# Patient Record
Sex: FEMALE | Race: WHITE | NOT HISPANIC OR LATINO | Employment: PART TIME | URBAN - METROPOLITAN AREA
[De-identification: names, ages, dates, MRNs, and addresses within clinical notes are randomized per-mention and may not be internally consistent; named-entity substitution may affect disease eponyms.]

---

## 2017-04-05 ENCOUNTER — OFFICE VISIT - HEALTHEAST (OUTPATIENT)
Dept: FAMILY MEDICINE | Facility: CLINIC | Age: 32
End: 2017-04-05

## 2017-04-05 DIAGNOSIS — J32.9 SINUSITIS: ICD-10-CM

## 2017-04-05 DIAGNOSIS — M54.50 LOW BACK PAIN: ICD-10-CM

## 2017-04-06 ENCOUNTER — COMMUNICATION - HEALTHEAST (OUTPATIENT)
Dept: FAMILY MEDICINE | Facility: CLINIC | Age: 32
End: 2017-04-06

## 2017-07-29 ENCOUNTER — HEALTH MAINTENANCE LETTER (OUTPATIENT)
Age: 32
End: 2017-07-29

## 2020-02-24 ENCOUNTER — OFFICE VISIT (OUTPATIENT)
Dept: ALLERGY | Facility: CLINIC | Age: 35
End: 2020-02-24
Payer: COMMERCIAL

## 2020-02-24 VITALS
DIASTOLIC BLOOD PRESSURE: 86 MMHG | OXYGEN SATURATION: 100 % | TEMPERATURE: 98.3 F | SYSTOLIC BLOOD PRESSURE: 118 MMHG | BODY MASS INDEX: 28.75 KG/M2 | HEIGHT: 63 IN | HEART RATE: 92 BPM | WEIGHT: 162.26 LBS

## 2020-02-24 DIAGNOSIS — L50.9 URTICARIA: ICD-10-CM

## 2020-02-24 DIAGNOSIS — J30.81 ALLERGIC RHINITIS DUE TO ANIMALS: ICD-10-CM

## 2020-02-24 DIAGNOSIS — J30.89 ALLERGIC RHINITIS CAUSED BY MOLD: ICD-10-CM

## 2020-02-24 DIAGNOSIS — J30.89 ALLERGIC RHINITIS DUE TO DUST MITE: ICD-10-CM

## 2020-02-24 DIAGNOSIS — J30.1 SEASONAL ALLERGIC RHINITIS DUE TO POLLEN: ICD-10-CM

## 2020-02-24 PROCEDURE — 95004 PERQ TESTS W/ALRGNC XTRCS: CPT | Performed by: ALLERGY & IMMUNOLOGY

## 2020-02-24 PROCEDURE — 99204 OFFICE O/P NEW MOD 45 MIN: CPT | Mod: 25 | Performed by: ALLERGY & IMMUNOLOGY

## 2020-02-24 RX ORDER — FEXOFENADINE HCL 180 MG/1
180 TABLET ORAL DAILY
Status: ON HOLD | COMMUNITY
Start: 2011-09-14 | End: 2022-07-22

## 2020-02-24 RX ORDER — LEVONORGESTREL AND ETHINYL ESTRADIOL 0.15-0.03
KIT ORAL DAILY
Status: ON HOLD | COMMUNITY
Start: 2019-12-18 | End: 2020-04-28

## 2020-02-24 RX ORDER — EPINEPHRINE 0.3 MG/.3ML
0.3 INJECTION SUBCUTANEOUS PRN
Qty: 0.6 ML | Refills: 3 | Status: SHIPPED | OUTPATIENT
Start: 2020-02-24 | End: 2022-08-04

## 2020-02-24 RX ORDER — AZELASTINE 1 MG/ML
2 SPRAY, METERED NASAL 2 TIMES DAILY PRN
Qty: 30 ML | Refills: 3 | Status: SHIPPED | OUTPATIENT
Start: 2020-02-24 | End: 2022-08-04

## 2020-02-24 RX ORDER — FLUTICASONE PROPIONATE 50 MCG
2 SPRAY, SUSPENSION (ML) NASAL DAILY
Qty: 16 G | Refills: 3 | Status: SHIPPED | OUTPATIENT
Start: 2020-02-24

## 2020-02-24 ASSESSMENT — ENCOUNTER SYMPTOMS
EYE REDNESS: 0
DIARRHEA: 0
SINUS PRESSURE: 0
ARTHRALGIAS: 0
SHORTNESS OF BREATH: 0
EYE DISCHARGE: 0
WHEEZING: 0
COUGH: 0
CHEST TIGHTNESS: 0
FACIAL SWELLING: 0
MYALGIAS: 0
FEVER: 0
ACTIVITY CHANGE: 0
CHILLS: 0
HEADACHES: 0
RHINORRHEA: 1
EYE ITCHING: 0
JOINT SWELLING: 0
ADENOPATHY: 0
NAUSEA: 0
VOMITING: 0

## 2020-02-24 ASSESSMENT — MIFFLIN-ST. JEOR: SCORE: 1418.13

## 2020-02-24 NOTE — LETTER
2/24/2020         RE: Reanna Gilbert  [address] [facility] [id number]        Dear Colleague,    Thank you for referring your patient, Reanna Gilbert, to the [facility]. Please see a copy of my visit note below.    SUBJECTIVE:                                                                   Reanna Gilbert presents today to our Allergy Clinic at [facility] for a new patient visit.   She is a 34-year-old female with possible environmental allergies.  She has environmental allergy symptoms since childhood. The patient report  Perennial/ chronic nasal symptoms sneezing, nose-blowing, frequent nasal congestion, and occasionally clear rhinorrhea. She denies ocular symptoms like itching, redness, watering, and burning.   She is not worse around dogs/cats.   Intranasal steroids have not been used.  Oral antihistamines (cetirizine/loratadine/fexofenadine) have been used, and they were partially effective.    There is no history of PE tubes, sinus surgeries, or tonsillectomy/adenoidectomy.  The patient reports having intermittent hives, manifested by solitary red, itchy, and raised spots that do not last more than 20 minutes. She knows for sure that dogs and dust exposure will trigger hives, but she still has hives without that exposure. Because the lesions are short-lasting, she doesn't take any meds for that. She has never had angioedema.     There is no problem list on file for this patient.      History reviewed. No pertinent past medical history.   Problem (# of Occurrences) Relation (Name,Age of Onset)    Allergies (3) Mother, Sister (Karely), Sister (Marcia)    Urticaria (1) Sister (Karely)        History reviewed. No pertinent surgical history.  Social History     Socioeconomic History     Marital status: Single     Spouse name: None     Number of children: None     Years of education: None     Highest education level: None   Occupational History     None    Social Needs     Financial resource strain: None     Food insecurity:     Worry: None     Inability: None     Transportation needs:     Medical: None     Non-medical: None   Tobacco Use     Smoking status: Never Smoker     Smokeless tobacco: Never Used   Substance and Sexual Activity     Alcohol use: Yes     Comment: socially     Drug use: None     Sexual activity: None   Lifestyle     Physical activity:     Days per week: None     Minutes per session: None     Stress: None   Relationships     Social connections:     Talks on phone: None     Gets together: None     Attends [employer] service: None     Active member of club or organization: None     Attends meetings of clubs or organizations: None     Relationship status: None     Intimate partner violence:     Fear of current or ex partner: None     Emotionally abused: None     Physically abused: None     Forced sexual activity: None   Other Topics Concern     None   Social History Narrative    February 24, 2020    ENVIRONMENTAL HISTORY: [place] The home is heated with a electric furnace. They do have central air conditioning. The patient's bedroom is furnished with carpeting in bedroom and fabric window coverings.  Pets inside the house include 2 dogs. There is no history of cockroach or mice infestation. There are no smokers in the house.  The house does not have a damp basement.            Review of Systems   Constitutional: Negative for activity change, chills and fever.   HENT: Positive for postnasal drip, rhinorrhea and sneezing. Negative for congestion, dental problem, ear pain, facial swelling, nosebleeds and sinus pressure.    Eyes: Negative for discharge, redness and itching.   Respiratory: Negative for cough, chest tightness, shortness of breath and wheezing.    Cardiovascular: Negative for chest pain.   Gastrointestinal: Negative for diarrhea, nausea and vomiting.   Musculoskeletal: Negative for arthralgias,  "joint swelling and myalgias.   Skin: Positive for rash (occasional hives).   Neurological: Negative for headaches.   Hematological: Negative for adenopathy.   Psychiatric/Behavioral: Negative for behavioral problems and self-injury.           Current Outpatient Medications:      azelastine (ASTELIN) 0.1 % nasal spray, Spray 2 sprays into both nostrils 2 times daily as needed for rhinitis, Disp: 30 mL, Rfl: 3     EPINEPHrine (ANY BX GENERIC EQUIV) 0.3 MG/0.3ML injection 2-pack, Inject 0.3 mLs (0.3 mg) into the muscle as needed for anaphylaxis, Disp: 0.6 mL, Rfl: 3     fluticasone (FLONASE) 50 MCG/ACT nasal spray, Spray 2 sprays into both nostrils daily, Disp: 16 g, Rfl: 3     SETLAKIN 0.15-0.03 MG tablet, daily, Disp: , Rfl:      fexofenadine (ALLEGRA) 180 MG tablet, Take 180 mg by mouth daily, Disp: , Rfl:   Immunization History   Administered Date(s) Administered     DTaP, Unspecified 08/03/2009     FLU 6-35 months 10/16/2013, 10/05/2016     HPV Quadrivalent 11/23/2007, 03/12/2008, 10/15/2008     HepB, Unspecified 09/08/1999, 10/14/1999, 04/24/2000, 08/30/2011     Influenza (IIV3) PF 11/23/2007, 10/06/2015     Influenza Vaccine IM > 6 months Valent IIV4 10/02/2017, 10/16/2019     MMR 11/02/1988, 09/08/1999     Meningococcal (Menactra ) 04/19/2006     Meningococcal (Menomune ) 04/19/2006     No Known Allergies  OBJECTIVE:                                                                 /86 (BP Location: Left arm, Patient Position: Sitting, Cuff Size: Adult Regular)   Pulse 92   Temp 98.3  F (36.8  C) (Tympanic)   Ht 1.605 m (5' 3.19\")   Wt 73.6 kg (162 lb 4.1 oz)   SpO2 100%   BMI 28.57 kg/m           Physical Exam  Vitals signs and nursing note reviewed.   Constitutional:       General: She is not in acute distress.     Appearance: She is not ill-appearing, toxic-appearing or diaphoretic.   HENT:      Head: Normocephalic and atraumatic.      Right Ear: Tympanic membrane, ear canal and external ear " normal.      Left Ear: Tympanic membrane, ear canal and external ear normal.      Nose: No mucosal edema, congestion or rhinorrhea.      Right Turbinates: Not enlarged, swollen or pale.      Left Turbinates: Not enlarged, swollen or pale.      Mouth/Throat:      Lips: Pink.      Mouth: Mucous membranes are moist.      Pharynx: Oropharynx is clear. No pharyngeal swelling, oropharyngeal exudate, posterior oropharyngeal erythema or uvula swelling.   Eyes:      General:         Right eye: No discharge.         Left eye: No discharge.      Conjunctiva/sclera: Conjunctivae normal.   Neck:      Musculoskeletal: Normal range of motion.   Cardiovascular:      Rate and Rhythm: Normal rate and regular rhythm.      Heart sounds: Normal heart sounds. No murmur.   Pulmonary:      Effort: Pulmonary effort is normal. No respiratory distress.      Breath sounds: Normal breath sounds and air entry. No stridor, decreased air movement or transmitted upper airway sounds. No decreased breath sounds, wheezing, rhonchi or rales.   Musculoskeletal: Normal range of motion.   Lymphadenopathy:      Cervical: No cervical adenopathy.   Skin:     General: Skin is warm.      Capillary Refill: Capillary refill takes less than 2 seconds.      Findings: No rash.   Neurological:      Mental Status: She is alert and oriented to person, place, and time.   Psychiatric:         Mood and Affect: Mood normal.         Behavior: Behavior normal.         WORKUP:     ENVIRONMENTAL PERCUTANEOUS SKIN TESTING: ADULT  [facility] 2/24/2020   Consent Y   Ordering Physician Marry   Interpreting Physician Marry   Testing Technician Radha LOZADA RN   Location Back   Time start: 12:15 PM   Time End: 12:30 PM   Positive Control: Histatrol*ALK 1 mg/ml 4/10   Negative Control: 50% Glycerin 0/0   Cat Hair*ALK (10,000 BAU/ml) 5/15   AP Dog Hair/Dander (1:100 w/v) 6/15   Dust Mite p. 30,000 AU/ml 5/20   Dust Mite f. (30,000 AU/ml) 0/0   Lakhwinder (W/F in millimeters)  0/0   Adis Grass (100,000 BAU/mL) 4/11   Red [place] (W/F in millimeters) 0/0   Maple/[place]. [place] Baptist (W/F in millimeters) 4/6   Hackberry (W/F in millimeters) 0/0   [place] (W/F in millimeters) 0/0   [place] *ALK (W/F in millimeters) 5/10   American Elm (W/F in millimeters) 0/0   [place] (W/F in millimeters) 4/15   Black [place] (W/F in millimeters) 0/0   Birch Mix (W/F in millimeters) 5/10   [place] (W/F in millimeters) 0/0   Oak (W/F in millimeters) 0/0   Cocklebur (W/F in millimeters) 0/0   [place]hel (W/F in millimeters) 0/0   White [place] (W/F in millimeters) 0/0   Careless (W/F in millimeters) 0/0   Nettle (W/F in millimeters) 0/0   English Plantain (W/F in millimeters) 4/20   Kochia (W/F in millimeters) 3/7   Lamb's Quarter (W/F in millimeters) 0/0   Marshelder (W/F in millimeters) 0/0   Ragweed Mix* ALK (W/F in millimeters) 8/25   Russian Thistle (W/F in millimeters) 0/0   Sagebrush/Mugwort (W/F in millimeters) 0/0   Sheep Sorrel (W/F in millimeters) 0/0   Feather Mix* ALK (W/F in millimeters) 0/0   Penicillium Mix (1:10 w/v) 0/0   Curvularia spicifera (1:10 w/v) 0/0   Epicoccum (1:10 w/v) 4/12   Aspergillus fumigatus (1:10 w/v): 0/0   Alternaria tenius (1:10 w/v) 0/0   H. Cladosporium (1:10 w/v) 0/0   Phoma herbarum (1:10 w/v) 0/0      My interpretation: Percutaneous skin puncture testing for aeroallergens performed today (February 24, 2020) showed sensitivity to the cat, dog, dust mite, grass pollen (Adis grass), tree pollen (Maple/Box Elder, White Pine, [place], Birch mix), weed pollen (English Plantain, Kochia, and ragweed), and Epicoccum mold.  The rest was negative with appropriate responses to positive and negative controls.    ASSESSMENT/PLAN:    1. Allergic rhinitis caused by mold  2. Seasonal allergic rhinitis due to pollen  3. Allergic rhinitis due to animals  4. Allergic rhinitis due to dust mite  Avoidance measures discussed and information provided.  -Start intranasal fluticasone 2 sprays in  each nostril once daily.  -Start azelastine 2 sprays in each nostril twice daily as needed.     The patient is interested in addressing the root of the problem rather than relying on nasal sprays long-term.  Allergen immunotherapy was discussed.  The patient is interested.  The patient was counseled regarding the time commitment, billing responsibility depending on the insurance coverage (also the insurance will be billed once allergen immunotherapy serums are compounded), auto-injectable epinephrine device policy, risks (I stated risks include hives, swelling, shortness of breath. I did state that one in 2.5 million shot administrations can result in death), and benefits of allergen immunotherapy and she wishes to proceed.  We went over the informed consent that needed to be signed, agreeing to remain in the clinic for 30 minutes after the injection.      fluticasone (FLONASE) 50 MCG/ACT nasal spray,         azelastine (ASTELIN) 0.1 % nasal spray           5. Urticaria  Exposure to pets can definitely cause contact urticaria.  The etiology of other episodes is idiopathic.  Symptoms are short lasting.  Never associated with angioedema.  Depending on future symptom control, may try cetirizine 10 mg by mouth once daily or fexofenadine 180 mg by mouth once daily on a scheduled basis.        Return in about 3 months (around 5/24/2020), or if symptoms worsen or fail to improve.    Thank you for allowing us to participate in the care of this patient. Please feel free to contact us if there are any questions or concerns about the patient.    Disclaimer: This note consists of symbols derived from keyboarding, dictation and/or voice recognition software. As a result, there may be errors in the script that have gone undetected. Please consider this when interpreting information found in this chart.    Canelo Tuttle MD, FAAAAI, FACAAI  Allergy, Asthma and Immunology  [place], [place] and Tushka[place]onel Gandara,  thank you for allowing me to participate in the care of your patient.        Sincerely,        Canelo Tuttle MD

## 2020-02-24 NOTE — NURSING NOTE
RN reviewed Anaphylaxis Action Plan with patient. Educated on the symptoms and treatment of anaphylaxis. Went through the different ways that a reaction can present, and the body systems that it can affect. Patient verbalized understanding.     Writer demonstrated how to use an Auvi-Q Epinephrine auto-injector.  Patient instructed to remove cap from device and follow the instructions given by the recorded audio. This includes removing the red safety release by pulling straight out, then firmly pushing the black tip against outer thigh until it clicks, hold for 5 seconds.  Patient advised that once used, needle will not be exposed, as it retracts back into the device. Patient was able to practice with the training device and demonstrated correct technique. Patient advised to call 911 or go to emergency department after Auvi-Q use for further monitoring.       Radha Hurd RN

## 2020-02-24 NOTE — PATIENT INSTRUCTIONS
Start Flonase 1-2 sprays in each nostril once daily.  Start azelastine 2 sprays in each nostril twice daily as needed.    If her eyes really start bothering you, you can try taking cetirizine or fexofenadine daily.        Anaphylaxis Action Plan for Immunotherapy Patients    There is risk of systemic reactions when receiving immunotherapy injections. Local reactions such as a wheal (hive) smaller than a quarter, redness, swelling, and soreness are common. If the wheal is greater than the size of a half dollar (3.4 cm) please contact our clinic (numbers below), as we will need to adjust the dose that you receive at your next injection. Waiting until the next appointment to inform us of the reaction could increase the wait time that you experience, because your allergist will need to be contacted for new orders prior to giving the injection.  If you have symptoms not localized to the injection site, please follow the anaphylaxis plan, and contact our office to update after you have received emergency medical treatment. Please ask to speak to an Allergy RN with any questions or updates.     [facility]: [phone]  [facility]: [phone]  [facility]: [phone]  [facility]: [phone]  [phone]

## 2020-02-24 NOTE — NURSING NOTE
Per provider verbal order, RN placed positive control, negative control, Adult Environmental Panel scratch test.  Consent was obtained prior to procedure.  Once scratch test(s) were placed, patient was monitored for 15 minutes in clinic.  RN read test after 15 minutes and provider was notified of results.  Pt tolerated procedure well.  All questions and concerns were addressed at office visit.     Radha LOZADA   Allergy ADDI

## 2020-02-24 NOTE — PROGRESS NOTES
SUBJECTIVE:                                                                   Reanna Gilbert presents today to our Allergy Clinic at [facility] for a new patient visit.   She is a 34-year-old female with possible environmental allergies.  She has environmental allergy symptoms since childhood. The patient report  Perennial/ chronic nasal symptoms sneezing, nose-blowing, frequent nasal congestion, and occasionally clear rhinorrhea. She denies ocular symptoms like itching, redness, watering, and burning.   She is not worse around dogs/cats.   Intranasal steroids have not been used.  Oral antihistamines (cetirizine/loratadine/fexofenadine) have been used, and they were partially effective.    There is no history of PE tubes, sinus surgeries, or tonsillectomy/adenoidectomy.  The patient reports having intermittent hives, manifested by solitary red, itchy, and raised spots that do not last more than 20 minutes. She knows for sure that dogs and dust exposure will trigger hives, but she still has hives without that exposure. Because the lesions are short-lasting, she doesn't take any meds for that. She has never had angioedema.     There is no problem list on file for this patient.      History reviewed. No pertinent past medical history.   Problem (# of Occurrences) Relation (Name,Age of Onset)    Allergies (3) Mother, Sister (Karely), Sister (Marcia)    Urticaria (1) Sister (Karely)        History reviewed. No pertinent surgical history.  Social History     Socioeconomic History     Marital status: Single     Spouse name: None     Number of children: None     Years of education: None     Highest education level: None   Occupational History     None   Social Needs     Financial resource strain: None     Food insecurity:     Worry: None     Inability: None     Transportation needs:     Medical: None     Non-medical: None   Tobacco Use     Smoking status: Never Smoker     Smokeless tobacco: Never Used    Substance and Sexual Activity     Alcohol use: Yes     Comment: socially     Drug use: None     Sexual activity: None   Lifestyle     Physical activity:     Days per week: None     Minutes per session: None     Stress: None   Relationships     Social connections:     Talks on phone: None     Gets together: None     Attends Alevism service: None     Active member of club or organization: None     Attends meetings of clubs or organizations: None     Relationship status: None     Intimate partner violence:     Fear of current or ex partner: None     Emotionally abused: None     Physically abused: None     Forced sexual activity: None   Other Topics Concern     None   Social History Narrative    February 24, 2020    ENVIRONMENTAL HISTORY: [place] The home is heated with a electric furnace. They do have central air conditioning. The patient's bedroom is furnished with carpeting in bedroom and fabric window coverings.  Pets inside the house include 2 dogs. There is no history of cockroach or mice infestation. There are no smokers in the house.  The house does not have a damp basement.            Review of Systems   Constitutional: Negative for activity change, chills and fever.   HENT: Positive for postnasal drip, rhinorrhea and sneezing. Negative for congestion, dental problem, ear pain, facial swelling, nosebleeds and sinus pressure.    Eyes: Negative for discharge, redness and itching.   Respiratory: Negative for cough, chest tightness, shortness of breath and wheezing.    Cardiovascular: Negative for chest pain.   Gastrointestinal: Negative for diarrhea, nausea and vomiting.   Musculoskeletal: Negative for arthralgias, joint swelling and myalgias.   Skin: Positive for rash (occasional hives).   Neurological: Negative for headaches.   Hematological: Negative for adenopathy.   Psychiatric/Behavioral: Negative for behavioral problems and self-injury.           Current  "Outpatient Medications:      azelastine (ASTELIN) 0.1 % nasal spray, Spray 2 sprays into both nostrils 2 times daily as needed for rhinitis, Disp: 30 mL, Rfl: 3     EPINEPHrine (ANY BX GENERIC EQUIV) 0.3 MG/0.3ML injection 2-pack, Inject 0.3 mLs (0.3 mg) into the muscle as needed for anaphylaxis, Disp: 0.6 mL, Rfl: 3     fluticasone (FLONASE) 50 MCG/ACT nasal spray, Spray 2 sprays into both nostrils daily, Disp: 16 g, Rfl: 3     SETLAKIN 0.15-0.03 MG tablet, daily, Disp: , Rfl:      fexofenadine (ALLEGRA) 180 MG tablet, Take 180 mg by mouth daily, Disp: , Rfl:   Immunization History   Administered Date(s) Administered     DTaP, Unspecified 08/03/2009     FLU 6-35 months 10/16/2013, 10/05/2016     HPV Quadrivalent 11/23/2007, 03/12/2008, 10/15/2008     HepB, Unspecified 09/08/1999, 10/14/1999, 04/24/2000, 08/30/2011     Influenza (IIV3) PF 11/23/2007, 10/06/2015     Influenza Vaccine IM > 6 months Valent IIV4 10/02/2017, 10/16/2019     MMR 11/02/1988, 09/08/1999     Meningococcal (Menactra ) 04/19/2006     Meningococcal (Menomune ) 04/19/2006     No Known Allergies  OBJECTIVE:                                                                 /86 (BP Location: Left arm, Patient Position: Sitting, Cuff Size: Adult Regular)   Pulse 92   Temp 98.3  F (36.8  C) (Tympanic)   Ht 1.605 m (5' 3.19\")   Wt 73.6 kg (162 lb 4.1 oz)   SpO2 100%   BMI 28.57 kg/m          Physical Exam  Vitals signs and nursing note reviewed.   Constitutional:       General: She is not in acute distress.     Appearance: She is not ill-appearing, toxic-appearing or diaphoretic.   HENT:      Head: Normocephalic and atraumatic.      Right Ear: Tympanic membrane, ear canal and external ear normal.      Left Ear: Tympanic membrane, ear canal and external ear normal.      Nose: No mucosal edema, congestion or rhinorrhea.      Right Turbinates: Not enlarged, swollen or pale.      Left Turbinates: Not enlarged, swollen or pale.      Mouth/Throat: "      Lips: Pink.      Mouth: Mucous membranes are moist.      Pharynx: Oropharynx is clear. No pharyngeal swelling, oropharyngeal exudate, posterior oropharyngeal erythema or uvula swelling.   Eyes:      General:         Right eye: No discharge.         Left eye: No discharge.      Conjunctiva/sclera: Conjunctivae normal.   Neck:      Musculoskeletal: Normal range of motion.   Cardiovascular:      Rate and Rhythm: Normal rate and regular rhythm.      Heart sounds: Normal heart sounds. No murmur.   Pulmonary:      Effort: Pulmonary effort is normal. No respiratory distress.      Breath sounds: Normal breath sounds and air entry. No stridor, decreased air movement or transmitted upper airway sounds. No decreased breath sounds, wheezing, rhonchi or rales.   Musculoskeletal: Normal range of motion.   Lymphadenopathy:      Cervical: No cervical adenopathy.   Skin:     General: Skin is warm.      Capillary Refill: Capillary refill takes less than 2 seconds.      Findings: No rash.   Neurological:      Mental Status: She is alert and oriented to person, place, and time.   Psychiatric:         Mood and Affect: Mood normal.         Behavior: Behavior normal.         WORKUP:     ENVIRONMENTAL PERCUTANEOUS SKIN TESTING: ADULT  [facility] 2/24/2020   Consent Y   Ordering Physician Marry   Interpreting Physician MARIA DOLORESleigh   Testing Technician Radha LOZADA RN   Location Back   Time start: 12:15 PM   Time End: 12:30 PM   Positive Control: Histatrol*ALK 1 mg/ml 4/10   Negative Control: 50% Glycerin 0/0   Cat Hair*ALK (10,000 BAU/ml) 5/15   AP Dog Hair/Dander (1:100 w/v) 6/15   Dust Mite p. 30,000 AU/ml 5/20   Dust Mite f. (30,000 AU/ml) 0/0   Lakhwinder (W/F in millimeters) 0/0   Adis Grass (100,000 BAU/mL) 4/11   Red Stanford (W/F in millimeters) 0/0   Maple/Woodruff (W/F in millimeters) 4/6   Hackberry (W/F in millimeters) 0/0   [place] (W/F in millimeters) 0/0   Brown *ALK (W/F in millimeters) 5/10   American Elm (W/F  in millimeters) 0/0   [place] (W/F in millimeters) 4/15   Black Rye (W/F in millimeters) 0/0   Birch Mix (W/F in millimeters) 5/10   [place] (W/F in millimeters) 0/0   Oak (W/F in millimeters) 0/0   Cocklebur (W/F in millimeters) 0/0   [place] (W/F in millimeters) 0/0   White Burton (W/F in millimeters) 0/0   Careless (W/F in millimeters) 0/0   Nettle (W/F in millimeters) 0/0   English Plantain (W/F in millimeters) 4/20   Kochia (W/F in millimeters) 3/7   Lamb's Quarter (W/F in millimeters) 0/0   Marshelder (W/F in millimeters) 0/0   Ragweed Mix* ALK (W/F in millimeters) 8/25   Russian Thistle (W/F in millimeters) 0/0   Sagebrush/Mugwort (W/F in millimeters) 0/0   Sheep Sorrel (W/F in millimeters) 0/0   Feather Mix* ALK (W/F in millimeters) 0/0   Penicillium Mix (1:10 w/v) 0/0   Curvularia spicifera (1:10 w/v) 0/0   Epicoccum (1:10 w/v) 4/12   Aspergillus fumigatus (1:10 w/v): 0/0   Alternaria tenius (1:10 w/v) 0/0   H. Cladosporium (1:10 w/v) 0/0   Phoma herbarum (1:10 w/v) 0/0      My interpretation: Percutaneous skin puncture testing for aeroallergens performed today (February 24, 2020) showed sensitivity to the cat, dog, dust mite, grass pollen (Adis grass), tree pollen (Maple/Box Elder, White Pine, [place], Birch mix), weed pollen (English Plantain, Kochia, and ragweed), and Epicoccum mold.  The rest was negative with appropriate responses to positive and negative controls.    ASSESSMENT/PLAN:    1. Allergic rhinitis caused by mold  2. Seasonal allergic rhinitis due to pollen  3. Allergic rhinitis due to animals  4. Allergic rhinitis due to dust mite  Avoidance measures discussed and information provided.  -Start intranasal fluticasone 2 sprays in each nostril once daily.  -Start azelastine 2 sprays in each nostril twice daily as needed.     The patient is interested in addressing the root of the problem rather than relying on nasal sprays long-term.  Allergen immunotherapy was discussed.  The patient  is interested.  The patient was counseled regarding the time commitment, billing responsibility depending on the insurance coverage (also the insurance will be billed once allergen immunotherapy serums are compounded), auto-injectable epinephrine device policy, risks (I stated risks include hives, swelling, shortness of breath. I did state that one in 2.5 million shot administrations can result in death), and benefits of allergen immunotherapy and she wishes to proceed.  We went over the informed consent that needed to be signed, agreeing to remain in the clinic for 30 minutes after the injection.      fluticasone (FLONASE) 50 MCG/ACT nasal spray,         azelastine (ASTELIN) 0.1 % nasal spray           5. Urticaria  Exposure to pets can definitely cause contact urticaria.  The etiology of other episodes is idiopathic.  Symptoms are short lasting.  Never associated with angioedema.  Depending on future symptom control, may try cetirizine 10 mg by mouth once daily or fexofenadine 180 mg by mouth once daily on a scheduled basis.        Return in about 3 months (around 5/24/2020), or if symptoms worsen or fail to improve.    Thank you for allowing us to participate in the care of this patient. Please feel free to contact us if there are any questions or concerns about the patient.    Disclaimer: This note consists of symbols derived from keyboarding, dictation and/or voice recognition software. As a result, there may be errors in the script that have gone undetected. Please consider this when interpreting information found in this chart.    Canelo Tuttle MD, FAAAAI, FACAAI  Allergy, Asthma and Immunology  [place], [place] and [place]

## 2020-02-24 NOTE — NURSING NOTE
Aeroallergen avoidance measures were discussed with the patient and literature was provided.     Radha LOZADA   Allergy RN

## 2020-02-25 DIAGNOSIS — J30.89 ALLERGIC RHINITIS DUE TO DUST MITE: ICD-10-CM

## 2020-02-25 DIAGNOSIS — J30.81 ALLERGIC RHINITIS DUE TO ANIMALS: ICD-10-CM

## 2020-02-25 DIAGNOSIS — J30.89 ALLERGIC RHINITIS CAUSED BY MOLD: ICD-10-CM

## 2020-02-25 DIAGNOSIS — J30.1 SEASONAL ALLERGIC RHINITIS DUE TO POLLEN: Primary | ICD-10-CM

## 2020-02-25 NOTE — PROGRESS NOTES
ALLERGY SOLUTION NEW REQUEST    Reanna Gilbert 1985 MRN: [id number]    DATE NEEDED: Routine  Vial Color Content   Top Dose         Vial Size  Green 1:1,000, Blue 1:100, Yellow 1:10 and Red 1:1 Trees, Weeds  Red 1:1 0.5 5mL  Green 1:1,000, Blue 1:100, Yellow 1:10 and Red 1:1 Cat, Dog, Grass, Dust Mite  Red 1:1 0.5 5mL  Green 1:1,000, Blue 1:100, Yellow 1:10 and Red 1:1 Mold  Red 1:1 0.5 5mL        Shot Clinic Location:  [place]  Ship to Location: [place]  Special Instructions:  New Allergy Patient--please call the patient when serum(s) arrive(s)        Requester Signature  Canelo Tuttle MD

## 2020-02-26 ENCOUNTER — TELEPHONE (OUTPATIENT)
Dept: ALLERGY | Facility: CLINIC | Age: 35
End: 2020-02-26

## 2020-02-26 DIAGNOSIS — J30.1 SEASONAL ALLERGIC RHINITIS DUE TO POLLEN: Primary | ICD-10-CM

## 2020-02-26 NOTE — TELEPHONE ENCOUNTER
Reason for Call:  Other prescription    Detailed comments: [facility] pharmacy calling stating need to clarify # to dispense on EPINEPHrine    Phone Number Patient can be reached at: Other phone number:  [phone] [facility] [address]*    Best Time: any     Can we leave a detailed message on this number? YES    Call taken on 2/26/2020 at 9:24 AM by Ana Fontenot

## 2020-02-27 RX ORDER — EPINEPHRINE 0.3 MG/.3ML
0.3 INJECTION SUBCUTANEOUS PRN
Qty: 2 EACH | Refills: 1 | Status: ON HOLD | OUTPATIENT
Start: 2020-02-27 | End: 2020-04-28

## 2020-03-06 ENCOUNTER — TELEPHONE (OUTPATIENT)
Dept: ALLERGY | Facility: CLINIC | Age: 35
End: 2020-03-06

## 2020-03-06 DIAGNOSIS — J30.1 SEASONAL ALLERGIC RHINITIS DUE TO POLLEN: Primary | ICD-10-CM

## 2020-03-06 DIAGNOSIS — J30.89 ALLERGIC RHINITIS DUE TO DUST MITE: ICD-10-CM

## 2020-03-06 DIAGNOSIS — J30.89 ALLERGIC RHINITIS CAUSED BY MOLD: ICD-10-CM

## 2020-03-06 DIAGNOSIS — J30.81 ALLERGIC RHINITIS DUE TO ANIMALS: ICD-10-CM

## 2020-03-06 PROCEDURE — 95165 ANTIGEN THERAPY SERVICES: CPT | Performed by: ALLERGY & IMMUNOLOGY

## 2020-03-06 PROCEDURE — 95165 ANTIGEN THERAPY SERVICES: CPT | Mod: 59 | Performed by: ALLERGY & IMMUNOLOGY

## 2020-03-06 NOTE — PROGRESS NOTES
Allergy serums received at [place].     Vials received below:    Vial Color  Content                        Vial Size Expiration Date  Green 1:1,000, Blue 1:100, Yellow 1:10 and Red 1:1 Trees, Weeds   5mL each Green-09/03/2020 and Blue/Yellow/Red-03/03/2021  Green 1:1,000, Blue 1:100, Yellow 1:10 and Red 1:1 Cat, Dog, Grass, Dust Mite 5mL each Green-09/03/2020 and Blue/Yellow/Red-03/03/2021  Green 1:1,000, Blue 1:100, Yellow 1:10 and Red 1:1 Molds    5mL each Green-09/03/2020 and Blue/Yellow/Red-03/03/2021            Signature  Tato Jane LPN

## 2020-03-06 NOTE — PROGRESS NOTES
Allergy serums billed at [place].     Vials billed below:    Vial Color  Content                        Vial Size Expiration Date  Green 1:1,000, Blue 1:100, Yellow 1:10 and Red 1:1 Trees, Weeds   5mL each Green-09/03/2020 and Blue/Yellow/Red-03/03/2021  Green 1:1,000, Blue 1:100, Yellow 1:10 and Red 1:1 Cat, Dog, Grass, Dust Mite 5mL each Green-09/03/2020 and Blue/Yellow/Red-03/03/2021  Green 1:1,000, Blue 1:100, Yellow 1:10 and Red 1:1 Molds    5mL each Green-09/03/2020 and Blue/Yellow/Red-03/03/2021    Original Refill encounter date: 02/25/20      Signature  Tato Jane LPN

## 2020-03-10 ENCOUNTER — ALLIED HEALTH/NURSE VISIT (OUTPATIENT)
Dept: ALLERGY | Facility: CLINIC | Age: 35
End: 2020-03-10
Payer: COMMERCIAL

## 2020-03-10 DIAGNOSIS — Z51.6 NEED FOR DESENSITIZATION TO ALLERGENS: Primary | ICD-10-CM

## 2020-03-10 PROCEDURE — 95117 IMMUNOTHERAPY INJECTIONS: CPT

## 2020-03-10 PROCEDURE — 99207 ZZC DROP WITH A PROCEDURE: CPT

## 2020-03-10 NOTE — PROGRESS NOTES
Patient presented after waiting 30 minutes with no reaction to  injections. Discharged from clinic.    Niyah Samuel RN

## 2020-03-13 ENCOUNTER — ALLIED HEALTH/NURSE VISIT (OUTPATIENT)
Dept: ALLERGY | Facility: CLINIC | Age: 35
End: 2020-03-13
Payer: COMMERCIAL

## 2020-03-13 DIAGNOSIS — J30.9 ALLERGIC RHINITIS: ICD-10-CM

## 2020-03-13 DIAGNOSIS — Z51.6 NEED FOR DESENSITIZATION TO ALLERGENS: Primary | ICD-10-CM

## 2020-03-13 PROCEDURE — 95117 IMMUNOTHERAPY INJECTIONS: CPT

## 2020-03-13 PROCEDURE — 99207 ZZC DROP WITH A PROCEDURE: CPT

## 2020-03-19 ENCOUNTER — TELEPHONE (OUTPATIENT)
Dept: ALLERGY | Facility: CLINIC | Age: 35
End: 2020-03-19

## 2020-03-19 NOTE — TELEPHONE ENCOUNTER
Called pt to inform her that her immunotherapy will be discontinued at this time. LMTCB  Tato Jane / BOLA

## 2020-03-20 NOTE — TELEPHONE ENCOUNTER
Spoke with pt and informed her that we will be discontinuing immunotherapy at this time. We will call pt when regular scheduling resumes. Pt understood.  Tato Jane / BOLA

## 2020-04-27 ENCOUNTER — APPOINTMENT (OUTPATIENT)
Dept: CT IMAGING | Facility: CLINIC | Age: 35
End: 2020-04-27
Attending: EMERGENCY MEDICINE
Payer: COMMERCIAL

## 2020-04-27 ENCOUNTER — HOSPITAL ENCOUNTER (OUTPATIENT)
Facility: CLINIC | Age: 35
Setting detail: OBSERVATION
Discharge: HOME | End: 2020-04-28
Attending: EMERGENCY MEDICINE | Admitting: INTERNAL MEDICINE
Payer: COMMERCIAL

## 2020-04-27 ENCOUNTER — APPOINTMENT (OUTPATIENT)
Dept: MRI IMAGING | Facility: CLINIC | Age: 35
End: 2020-04-27
Attending: EMERGENCY MEDICINE
Payer: COMMERCIAL

## 2020-04-27 DIAGNOSIS — R11.0 NAUSEA: ICD-10-CM

## 2020-04-27 DIAGNOSIS — I63.9 CEREBROVASCULAR ACCIDENT (CVA), UNSPECIFIED MECHANISM (H): ICD-10-CM

## 2020-04-27 DIAGNOSIS — R51.9 ACUTE NONINTRACTABLE HEADACHE, UNSPECIFIED HEADACHE TYPE: ICD-10-CM

## 2020-04-27 DIAGNOSIS — H81.10 BENIGN PAROXYSMAL POSITIONAL VERTIGO, UNSPECIFIED LATERALITY: Primary | ICD-10-CM

## 2020-04-27 DIAGNOSIS — R42 DIZZINESS: ICD-10-CM

## 2020-04-27 DIAGNOSIS — R51.9 NONINTRACTABLE HEADACHE, UNSPECIFIED CHRONICITY PATTERN, UNSPECIFIED HEADACHE TYPE: ICD-10-CM

## 2020-04-27 DIAGNOSIS — R93.0 ABNORMAL MRI OF HEAD: ICD-10-CM

## 2020-04-27 DIAGNOSIS — R26.81 UNSTEADINESS ON FEET: ICD-10-CM

## 2020-04-27 DIAGNOSIS — R93.89 ABNORMAL MRI: ICD-10-CM

## 2020-04-27 LAB
ANION GAP SERPL CALCULATED.3IONS-SCNC: 8 MMOL/L (ref 3–14)
BASOPHILS # BLD AUTO: 0.1 10E9/L (ref 0–0.2)
BASOPHILS NFR BLD AUTO: 0.9 %
BUN SERPL-MCNC: 10 MG/DL (ref 7–30)
CALCIUM SERPL-MCNC: 9.5 MG/DL (ref 8.5–10.1)
CHLORIDE SERPL-SCNC: 109 MMOL/L (ref 94–109)
CO2 SERPL-SCNC: 22 MMOL/L (ref 20–32)
CREAT SERPL-MCNC: 0.67 MG/DL (ref 0.52–1.04)
CRP SERPL-MCNC: 4 MG/L (ref 0–8)
D DIMER PPP FEU-MCNC: <0.3 UG/ML FEU (ref 0–0.5)
DIFFERENTIAL METHOD BLD: NORMAL
EOSINOPHIL # BLD AUTO: 0.2 10E9/L (ref 0–0.7)
EOSINOPHIL NFR BLD AUTO: 2.3 %
ERYTHROCYTE [DISTWIDTH] IN BLOOD BY AUTOMATED COUNT: 12.4 % (ref 10–15)
ERYTHROCYTE [SEDIMENTATION RATE] IN BLOOD BY WESTERGREN METHOD: 7 MM/H (ref 0–20)
GFR SERPL CREATININE-BSD FRML MDRD: >90 ML/MIN/{1.73_M2}
GLUCOSE SERPL-MCNC: 84 MG/DL (ref 70–99)
HBA1C MFR BLD: 4.8 % (ref 0–5.6)
HCG UR QL: NEGATIVE
HCT VFR BLD AUTO: 46.5 % (ref 35–47)
HGB BLD-MCNC: 15.3 G/DL (ref 11.7–15.7)
IMM GRANULOCYTES # BLD: 0 10E9/L (ref 0–0.4)
IMM GRANULOCYTES NFR BLD: 0.1 %
LYMPHOCYTES # BLD AUTO: 1.6 10E9/L (ref 0.8–5.3)
LYMPHOCYTES NFR BLD AUTO: 23.9 %
MCH RBC QN AUTO: 30.7 PG (ref 26.5–33)
MCHC RBC AUTO-ENTMCNC: 32.9 G/DL (ref 31.5–36.5)
MCV RBC AUTO: 93 FL (ref 78–100)
MONOCYTES # BLD AUTO: 0.4 10E9/L (ref 0–1.3)
MONOCYTES NFR BLD AUTO: 5.4 %
NEUTROPHILS # BLD AUTO: 4.6 10E9/L (ref 1.6–8.3)
NEUTROPHILS NFR BLD AUTO: 67.4 %
NRBC # BLD AUTO: 0 10*3/UL
NRBC BLD AUTO-RTO: 0 /100
PLATELET # BLD AUTO: 236 10E9/L (ref 150–450)
POTASSIUM SERPL-SCNC: 3.8 MMOL/L (ref 3.4–5.3)
RBC # BLD AUTO: 4.98 10E12/L (ref 3.8–5.2)
SODIUM SERPL-SCNC: 139 MMOL/L (ref 133–144)
WBC # BLD AUTO: 6.9 10E9/L (ref 4–11)

## 2020-04-27 PROCEDURE — 96374 THER/PROPH/DIAG INJ IV PUSH: CPT | Mod: 59 | Performed by: EMERGENCY MEDICINE

## 2020-04-27 PROCEDURE — 25500064 ZZH RX 255 OP 636: Performed by: EMERGENCY MEDICINE

## 2020-04-27 PROCEDURE — 70496 CT ANGIOGRAPHY HEAD: CPT

## 2020-04-27 PROCEDURE — 86147 CARDIOLIPIN ANTIBODY EA IG: CPT | Performed by: INTERNAL MEDICINE

## 2020-04-27 PROCEDURE — 86146 BETA-2 GLYCOPROTEIN ANTIBODY: CPT | Performed by: INTERNAL MEDICINE

## 2020-04-27 PROCEDURE — 00000167 ZZHCL STATISTIC INR NC: Performed by: INTERNAL MEDICINE

## 2020-04-27 PROCEDURE — 87635 SARS-COV-2 COVID-19 AMP PRB: CPT | Performed by: INTERNAL MEDICINE

## 2020-04-27 PROCEDURE — 81241 F5 GENE: CPT | Performed by: INTERNAL MEDICINE

## 2020-04-27 PROCEDURE — 81025 URINE PREGNANCY TEST: CPT | Performed by: EMERGENCY MEDICINE

## 2020-04-27 PROCEDURE — 85652 RBC SED RATE AUTOMATED: CPT | Performed by: INTERNAL MEDICINE

## 2020-04-27 PROCEDURE — 25000128 H RX IP 250 OP 636: Performed by: EMERGENCY MEDICINE

## 2020-04-27 PROCEDURE — 85025 COMPLETE CBC W/AUTO DIFF WBC: CPT | Performed by: EMERGENCY MEDICINE

## 2020-04-27 PROCEDURE — 00000401 ZZHCL STATISTIC THROMBIN TIME NC: Performed by: INTERNAL MEDICINE

## 2020-04-27 PROCEDURE — 70553 MRI BRAIN STEM W/O & W/DYE: CPT

## 2020-04-27 PROCEDURE — 25000132 ZZH RX MED GY IP 250 OP 250 PS 637: Performed by: EMERGENCY MEDICINE

## 2020-04-27 PROCEDURE — 25800030 ZZH RX IP 258 OP 636: Performed by: EMERGENCY MEDICINE

## 2020-04-27 PROCEDURE — A9585 GADOBUTROL INJECTION: HCPCS | Performed by: EMERGENCY MEDICINE

## 2020-04-27 PROCEDURE — 86140 C-REACTIVE PROTEIN: CPT | Performed by: INTERNAL MEDICINE

## 2020-04-27 PROCEDURE — 70450 CT HEAD/BRAIN W/O DYE: CPT | Mod: XU

## 2020-04-27 PROCEDURE — 85613 RUSSELL VIPER VENOM DILUTED: CPT | Performed by: INTERNAL MEDICINE

## 2020-04-27 PROCEDURE — 99285 EMERGENCY DEPT VISIT HI MDM: CPT | Mod: Z6 | Performed by: EMERGENCY MEDICINE

## 2020-04-27 PROCEDURE — 83036 HEMOGLOBIN GLYCOSYLATED A1C: CPT | Performed by: INTERNAL MEDICINE

## 2020-04-27 PROCEDURE — 36415 COLL VENOUS BLD VENIPUNCTURE: CPT | Performed by: INTERNAL MEDICINE

## 2020-04-27 PROCEDURE — 80048 BASIC METABOLIC PNL TOTAL CA: CPT | Performed by: EMERGENCY MEDICINE

## 2020-04-27 PROCEDURE — 96361 HYDRATE IV INFUSION ADD-ON: CPT | Performed by: EMERGENCY MEDICINE

## 2020-04-27 PROCEDURE — 85730 THROMBOPLASTIN TIME PARTIAL: CPT | Performed by: INTERNAL MEDICINE

## 2020-04-27 PROCEDURE — 85300 ANTITHROMBIN III ACTIVITY: CPT | Performed by: INTERNAL MEDICINE

## 2020-04-27 PROCEDURE — 81240 F2 GENE: CPT | Performed by: INTERNAL MEDICINE

## 2020-04-27 PROCEDURE — G0378 HOSPITAL OBSERVATION PER HR: HCPCS

## 2020-04-27 PROCEDURE — 85303 CLOT INHIBIT PROT C ACTIVITY: CPT | Performed by: INTERNAL MEDICINE

## 2020-04-27 PROCEDURE — 99219 ZZC INITIAL OBSERVATION CARE,LEVL II: CPT | Performed by: INTERNAL MEDICINE

## 2020-04-27 PROCEDURE — 25000125 ZZHC RX 250: Performed by: EMERGENCY MEDICINE

## 2020-04-27 PROCEDURE — 85379 FIBRIN DEGRADATION QUANT: CPT | Performed by: INTERNAL MEDICINE

## 2020-04-27 PROCEDURE — 99285 EMERGENCY DEPT VISIT HI MDM: CPT | Mod: 25 | Performed by: EMERGENCY MEDICINE

## 2020-04-27 PROCEDURE — 85306 CLOT INHIBIT PROT S FREE: CPT | Performed by: INTERNAL MEDICINE

## 2020-04-27 RX ORDER — GADOBUTROL 604.72 MG/ML
7 INJECTION INTRAVENOUS ONCE
Status: COMPLETED | OUTPATIENT
Start: 2020-04-27 | End: 2020-04-27

## 2020-04-27 RX ORDER — ONDANSETRON 4 MG/1
4 TABLET, ORALLY DISINTEGRATING ORAL EVERY 6 HOURS PRN
Status: DISCONTINUED | OUTPATIENT
Start: 2020-04-27 | End: 2020-04-28 | Stop reason: HOSPADM

## 2020-04-27 RX ORDER — ASPIRIN 325 MG
325 TABLET ORAL ONCE
Status: COMPLETED | OUTPATIENT
Start: 2020-04-27 | End: 2020-04-27

## 2020-04-27 RX ORDER — ONDANSETRON 2 MG/ML
4 INJECTION INTRAMUSCULAR; INTRAVENOUS ONCE
Status: COMPLETED | OUTPATIENT
Start: 2020-04-27 | End: 2020-04-27

## 2020-04-27 RX ORDER — ASPIRIN 81 MG/1
81 TABLET ORAL DAILY
Status: DISCONTINUED | OUTPATIENT
Start: 2020-04-28 | End: 2020-04-28 | Stop reason: HOSPADM

## 2020-04-27 RX ORDER — IOPAMIDOL 755 MG/ML
70 INJECTION, SOLUTION INTRAVASCULAR ONCE
Status: COMPLETED | OUTPATIENT
Start: 2020-04-27 | End: 2020-04-27

## 2020-04-27 RX ORDER — ACETAMINOPHEN 325 MG/1
650 TABLET ORAL EVERY 4 HOURS PRN
Status: DISCONTINUED | OUTPATIENT
Start: 2020-04-27 | End: 2020-04-28 | Stop reason: HOSPADM

## 2020-04-27 RX ORDER — MECLIZINE HYDROCHLORIDE 25 MG/1
25 TABLET ORAL
Status: COMPLETED | OUTPATIENT
Start: 2020-04-27 | End: 2020-04-27

## 2020-04-27 RX ORDER — ONDANSETRON 2 MG/ML
4 INJECTION INTRAMUSCULAR; INTRAVENOUS EVERY 6 HOURS PRN
Status: DISCONTINUED | OUTPATIENT
Start: 2020-04-27 | End: 2020-04-28 | Stop reason: HOSPADM

## 2020-04-27 RX ORDER — GADOBUTROL 604.72 MG/ML
7 INJECTION INTRAVENOUS ONCE
Status: DISCONTINUED | OUTPATIENT
Start: 2020-04-27 | End: 2020-04-27

## 2020-04-27 RX ADMIN — ONDANSETRON 4 MG: 2 INJECTION INTRAMUSCULAR; INTRAVENOUS at 10:47

## 2020-04-27 RX ADMIN — ASPIRIN 325 MG ORAL TABLET 325 MG: 325 PILL ORAL at 14:42

## 2020-04-27 RX ADMIN — SODIUM CHLORIDE 500 ML: 9 INJECTION, SOLUTION INTRAVENOUS at 10:46

## 2020-04-27 RX ADMIN — MECLIZINE HYDROCHLORIDE 25 MG: 25 TABLET ORAL at 11:45

## 2020-04-27 RX ADMIN — GADOBUTROL 7 ML: 604.72 INJECTION INTRAVENOUS at 12:05

## 2020-04-27 RX ADMIN — IOPAMIDOL 70 ML: 755 INJECTION, SOLUTION INTRAVENOUS at 11:11

## 2020-04-27 RX ADMIN — SODIUM CHLORIDE 100 ML: 9 INJECTION, SOLUTION INTRAVENOUS at 11:11

## 2020-04-27 ASSESSMENT — ENCOUNTER SYMPTOMS
CONSTITUTIONAL NEGATIVE: 1
RESPIRATORY NEGATIVE: 1
HEMATOLOGIC/LYMPHATIC NEGATIVE: 1
EYES NEGATIVE: 1
ENDOCRINE NEGATIVE: 1
DIZZINESS: 1
CARDIOVASCULAR NEGATIVE: 1
MUSCULOSKELETAL NEGATIVE: 1
NAUSEA: 1
HEADACHES: 1
PSYCHIATRIC NEGATIVE: 1

## 2020-04-27 ASSESSMENT — MIFFLIN-ST. JEOR: SCORE: 1416.13

## 2020-04-27 NOTE — PROGRESS NOTES
[facility]    Stroke Telephone Note    I was called by Dr. Frankie Salgado on 04/27/20 at 1420 regarding patient Reanna Gilbert. The patient is a 34 year old female with no pmh on OCP presenting with 3 days h/o headache, dizziness with gait instability. Work up in ED included an MRI brain with right thalamocapsular infarct hyperintensity. CTA with patent intracranial and extracranial vessels    Impression  #Rt thalamocapsular infarct. Etiology is unclear but supsecting likely from hypercoagulable state in a patient on OCP    Recommendations  Acute Ischemic Stroke (without tPA) Recommendations  - Permissive HTN; labetalol PRN for SBP > 220  - Daily aspirin 325 mg daily for secondary stroke prevention  - TTE with Bubble Study  - Telemetry, EKG  - Bedside Glucose Monitoring  - A1c, Lipid Panel. Will hold of statin at this time  - Hypercoagulable work up: Lupus panel (anticoagulant/inhibitor). Cardiolipin antibody IgG and IgM, Beta-2 glycoprotein antibodies IgG and IgM; Factor 2 prothrombin and Factor 5 leiden mutation analysis (order together or individually if one has already been completed) Protein C chromogenic, Protein S free and Antithrombin III  - Hold OCP. Will need to discuss with OB/GYN    - PT/OT/SLP  - Stroke Education  - Euthermia, Euglycemia      My recommendations are based on the limited information provided on the phone by Dr. Frankie Salgado. They are not intended to replace the clinical judgment of Dr. Frankie Salgado which should always be utilized to provide the most appropriate care to meet the unique needs of this patient.  I was not requested to personally see or examine the patient at this time.    Regan Lewis MD   Neurocritical Care    Text Page ([phone])

## 2020-04-27 NOTE — H&P
[facility]    History and Physical  Hospital Medicine       Date of Admission:  4/27/2020  Date of Service: 4/27/2020     Assessment & Plan   Reanna Gilbert is a 34 year old female with past medical history significant for seasonal allergies who now presents on 4/27/2020 with 2 days of intermittent room spinning dizziness.    Possible stroke (H)    Headache and intermittent room spinning dizziness onset morning of 4/25. Headache resolved with Excedrin but not intermittent dizziness. Negative Kristina-Hallpike exam in ED.     MRI positive for single nonspecific focus of T2/T2 FLAIR hyperintense signal along the lateral margin of the right thalamus and the posterior limb of the right internal capsule, not acute. May be subacute CVA possibly lacunar type though would be unusual for her age and no h/o hypertension. Case discussed with stroke neurology in the ED and recommended observation, echo, telemetry, A1c, FLP and hypercoagulable work up. No focal neurologic findings on exam and no current symptoms. Patient on OCP which can increase thrombosis risk.     There are reports of CVA as first sign of COVID19 (see below).     Possible MRI finding is incidental and not related to her symptoms. Demyelination lesion possible but only one lesion seen.    - aspirin as recommended   - FLP, A1c   - hypercoag work up   - tele, echo with bubble   - physical therapy and occupational therapy evals   - stop OCP   - COVID19 testing    COVID19 rule out    Patient is in healthcare (radiology technician). 3 known COVID exposures but no break in PPE. No systemic signs or symptoms of COVID except possibly CVA as above. Pretest COVID suspicion is low.   - check COVID PCR   - check d-dimer  Addendum: COVID testing is negative. No indication for repeating test. Can discontinue COVID precautions.     Headache    Occasional headaches last 4 weeks but not as severe as on Saturday. Now resolved after Excedrin x 1. No  recent h/o migraines but has as young child. No aura, photosensitivity, visual changes.    - follow for recurrence    Dizziness    Vertiginous episodes occurring with movements and short-lived when they occur. Possibly related to MRI findings but not clear. Beatriz negative.    - physical therapy eval, follow for recurrence    Seasonal and environmental allergies    Patient had started allergy injections for desensitization this winter, but she had to stop after only 3 doses due to the social distancing policies in the clinic.  She takes over-the-counter antihistamines regularly.    DVT Prophylaxis: Low Risk/Ambulatory with no VTE prophylaxis indicated  Code Status: Full Code    Lines: PIV   Presley catheter: None     Disposition: Anticipate discharge in 1 day(s) once testing complete and if remains neurologically stable. Appropriate for observation.    Nitish Oseguera MD  [facility]        Primary Care Physician   No Ref-Primary, Physician None    History is obtained from the patient who is an excellent historian, discussion with ED physician and review of old records via the EMR.    History of Present Illness     Reanna Gilbert is a 34 year old female who presents with intermittent room spinning dizziness for 2 days.  Patient was in her usual state of health and then woke up Saturday morning with severe headache that was in the forehead and was unilateral but does not remember if it was the right side or left side.  Headache was associated with nausea without vomiting and also intermittent room spinning dizziness particularly after certain movements such as tilting her head when she would reach down to wipe her self in the bathroom or when rolling over in bed.  On Saturday she took Excedrin for the headache and then a nap and then the headache was better and has not recurred.  However she continues to have intermittent episodes of room spinning dizziness that will last a few seconds at a time.  She is not  had any falls with this.  She had 2 episodes today while at work.  The first 1 was when she was reaching up to a shelf that was above her and then again when she was in the bathroom later.  Her nausea has improved though she is noted a decrease in appetite.  No hearing changes.  No vision changes.  No numbness or focal weakness.  No history of prior symptoms like this.  In the ED, Darwin-Hallpike maneuver was negative.      No history of hypertension, dyslipidemia, diabetes, smoking or family history of early vascular disease.  No history of stroke or other neurologic disease. She is on OCP for birth control.       Patient reports she has been having occasional headaches for the last 4 weeks or so.  She been having these about once a week.  This is unusual for her.  She does not recall if these headaches were unilateral and they were not severe until the headache on Saturday.  She reports that her mother believes she had migraine headaches when she was very young but she otherwise has no history of migraines.  She had no prodrome, aura, vision changes or photophobia.  Her headache resolved after 1 dose of Excedrin on Saturday.      The patient has had 3 known COVID positive exposures as part of her job as a radiology technician, but there was no break in appropriate PPE on any of those exposures.  No other known exposures.  She has had no fever, myalgias, taste or smell changes, cough or sore throat.  She has felt fine other than the headache and dizziness noted.    Review of Systems   The 10 point Review of Systems is negative other than noted in the HPI or here.  No other pertinent findings    Past Medical History      Seasonal and environmental allergies    Past Surgical History   S/p right knee ACL repair.    Prior to Admission Medications   Prior to Admission Medications   Prescriptions Last Dose Informant Patient Reported? Taking?   EPINEPHrine (ANY BX GENERIC EQUIV) 0.3 MG/0.3ML injection 2-pack   No No   Sig:  Inject 0.3 mLs (0.3 mg) into the muscle as needed for anaphylaxis   EPINEPHrine (AUVI-Q) 0.3 MG/0.3ML injection 2-pack   No No   Sig: Inject 0.3 mLs (0.3 mg) into the muscle as needed for anaphylaxis   ORDER FOR ALLERGEN IMMUNOTHERAPY   No No   Sig: Name of Mix: Mix #1  Tree , Weeds  Birch Mix PRW 1:20 w/v, HS  0.5 ml  Boxelder-Maple Mix BHR (Boxelder Hard Red) 1:20 w/v, HS  0.5 ml  [place], Common 1:20 w/v, HS  0.5 ml  Pine, White 1:20 w/v, ALK 0.5 ml  Kochia 1:20 w/v, HS 0.5 ml  Plantain, English 1:20 w/v, HS 0.5 ml  Ragweed Mixed 1:20 w/v ALK  0.5 ml  Diluent: HSA qs to 5ml   ORDER FOR ALLERGEN IMMUNOTHERAPY   No No   Sig: Name of Mix: Mix #2  Dust Mite, Cat, Dog, Grass  Cat Hair, Standardized 10,000 BAU/mL, ALK  2.0 ml  Dog Hair-Dander, A. P.  1:100 w/v, HS  1.0 ml  Dust Mites DP. 10,000 AU/mL, HS  0.5 ml   Adis Grass (Std) 100,000 BAU/mL, HS 0.3 ml  Diluent: HSA qs to 5ml   ORDER FOR ALLERGEN IMMUNOTHERAPY   No No   Sig: Name of Mix: Mix #3  Mold  Epicoccum Nigrum 1:10 w/v, HS 0.5 ml  Diluent: HSA qs to 5ml   SETLAKIN 0.15-0.03 MG tablet   Yes No   Sig: daily   azelastine (ASTELIN) 0.1 % nasal spray   No No   Sig: Spray 2 sprays into both nostrils 2 times daily as needed for rhinitis   fexofenadine (ALLEGRA) 180 MG tablet   Yes No   Sig: Take 180 mg by mouth daily   fluticasone (FLONASE) 50 MCG/ACT nasal spray   No No   Sig: Spray 2 sprays into both nostrils daily      Facility-Administered Medications: None     Allergies   No Known Allergies    Family History    Family History   Problem Relation Age of Onset     Allergies Mother      Allergies Sister      Urticaria Sister      Allergies Sister    No family history of stroke or coronary disease.  Parents are alive and well.  One sister has hypertension.  One aunt has diabetes.    Social History   Social History     Socioeconomic History     Marital status: Single     Spouse name: Not on file     Number of children: Not on file     Years of education: Not  "on file     Highest education level: Not on file   Occupational History     Not on file   Social Needs     Financial resource strain: Not on file     Food insecurity     Worry: Not on file     Inability: Not on file     Transportation needs     Medical: Not on file     Non-medical: Not on file   Tobacco Use     Smoking status: Never Smoker     Smokeless tobacco: Never Used   Substance and Sexual Activity     Alcohol use: Yes     Comment: socially     Drug use: Never     Sexual activity: Not on file   Social History Narrative    February 24, 2020    ENVIRONMENTAL HISTORY: [place] The home is heated with a electric furnace. They do have central air conditioning. The patient's bedroom is furnished with carpeting in bedroom and fabric window coverings.  Pets inside the house include 2 dogs. There is no history of cockroach or mice infestation. There are no smokers in the house.  The house does not have a damp basement.    Lives with boyfriend.     Physical Exam   /76   Pulse 75   Temp 98.5  F (36.9  C) (Oral)   Resp 16   Ht 1.6 m (5' 3\")   Wt 73.7 kg (162 lb 7.7 oz)   LMP  (LMP Unknown)   SpO2 99%   BMI 28.78 kg/m       Weight: 162 lbs 7.66 oz Body mass index is 28.78 kg/m .     Constitutional: Alert, oriented, cooperative, no apparent distress, appears nontoxic, normal affect.   Eyes: Eyes are clear, pupils are reactive.  HEENT: Oropharynx is clear and moist. No evidence of cranial trauma.  Lymph/Hematologic: No epitrochlear, axillary, anterior or posterior cervical, or supraclavicular lymphadenopathy is appreciated.  Cardiovascular: Regular rate and rhythm, normal S1 and S2, and no murmur noted. JVP is normal. Good peripheral pulses in wrists bilaterally. No lower extremity edema.  Respiratory: Clear to auscultation bilaterally.   GI: Soft, non-tender, normal bowel sounds, no hepatomegaly.  Genitourinary: Deferred  Musculoskeletal: Normal muscle bulk and " tone.  Skin: Warm and dry, no rashes.   Neurologic: Neck supple. EOMI, no nystagmus, PERRL, face moves symmetrically, normal speech. , biceps, triceps, hip flexion, dorsiflexion strenght normal and symmetric.  No palmar drift.  Finger-to-nose is smooth and accurate bilaterally.  Finger-nose-finger is smooth and accurate bilaterally.  Heel-to-shin is without dysmetria.  He can reflexes are 1-2+ throughout and symmetric.    Data   Data reviewed today:   Recent Labs   Lab 04/27/20  1045   WBC 6.9   HGB 15.3   MCV 93         POTASSIUM 3.8   CHLORIDE 109   CO2 22   BUN 10   CR 0.67   ANIONGAP 8   CATALINA 9.5   GLC 84       Recent Results (from the past 24 hour(s))   CT Head w/o Contrast    Narrative    CT SCAN OF THE HEAD WITHOUT CONTRAST   4/27/2020 11:41 AM     HISTORY: 2-day history of headache, nausea, dizziness. Report of  unsteadiness of gait, on birth control. Evaluate for acute process.  Possible stroke.    TECHNIQUE:  Axial images of the head and coronal reformations without  IV contrast material. Radiation dose for this scan was reduced using  automated exposure control, adjustment of the mA and/or kV according  to patient size, or iterative reconstruction technique.    COMPARISON: None.    FINDINGS: The ventricles are normal in size and configuration. The  cerebral hemispheres, brainstem and cerebellum have a normal  morphology and attenuation. No extended signs of acute infarct are  seen. No intracranial hemorrhage, mass lesion or mass effect.    Visualized orbits appear normal. The paranasal sinuses, mastoids and  middle ear cavities are clear.      Impression    IMPRESSION:  No CT findings of acute intracranial abnormality.  Specifically, no extended signs of acute infarct, intracranial  hemorrhage, or mass effect.    ALEX MELLO MD   CTA Head Neck with Contrast    Narrative    CT ANGIOGRAM OF THE HEAD AND NECK WITH CONTRAST  4/27/2020 11:43 AM     HISTORY: Headache, dizziness, nausea,  report of unsteady gait.   Evaluate for acute process.     TECHNIQUE:  CT angiography with an injection of 70 mL Isovue 370 IV  with scans through the head and neck. Images were transferred to a  separate 3-D workstation where multiplanar reformations and 3-D images  were created. Estimates of carotid stenoses are made relative to the  distal internal carotid artery diameters except as noted. Radiation  dose for this scan was reduced using automated exposure control,  adjustment of the mA and/or kV according to patient size, or iterative  reconstruction technique.     COMPARISON: CT head of same date.     CT HEAD FINDINGS: No contrast enhancing lesions. Cerebral blood flow  is grossly normal.     CT ANGIOGRAM HEAD FINDINGS:  The major intracranial arteries including  the proximal branches of the anterior cerebral, middle cerebral, and  posterior cerebral arteries appear patent without vascular cutoff. No  aneurysm identified. No significant stenosis. Venous circulation is  unremarkable.     CT ANGIOGRAM NECK FINDINGS:   Common origin of the brachiocephalic and left common carotid arteries  from the aortic arch, a normal variant.     Right carotid artery: The right common and internal carotid arteries  are patent. No significant stenosis or atherosclerotic disease in the  carotid artery.     Left carotid artery: The left common and internal carotid arteries are  patent. No significant stenosis or atherosclerotic disease in the  carotid artery.     Vertebral arteries: Vertebral arteries are patent without evidence of  dissection. No significant stenosis.     Other findings: None.       Impression    IMPRESSION: Patent arteries in the head and neck without vascular  cutoff. No evidence of dissection. No aneurysm identified. No  significant stenosis.     ALEX MELLO MD   MR Brain w/o & w Contrast    Narrative    MRI BRAIN WITHOUT AND WITH CONTRAST  4/27/2020 12:26 PM     HISTORY:  Headache, nausea, dizziness x 3  days, report of unsteady  gait.  On birth control.  Evaluate for acute process possible stroke.     TECHNIQUE:  Multiplanar, multisequence MRI of the brain without and  with 7 mL Gadavist.     COMPARISON: CT head and CT angiogram of the head and neck of same  date.    FINDINGS: There is a single nonspecific focus of mild T2/T2 FLAIR  hyperintense signal along the lateral margin of the right thalamus at  its junction with the posterior limb of the right internal capsule.  This is nonspecific. It could be sequela of an old nonspecific insult  such as a lacunar infarct, although this would be unusual in a patient  of this age. It could represent an atypical appearance of a dilated  perivascular space, focus of chronic demyelination, or less likely  early chronic small vessel ischemic change. This does not appear to be  an acute lesion. No expansile quality or mass effect. No associated  enhancement, susceptibility artifact or restricted diffusion.    The ventricles are normal in size and configuration. The brain  parenchyma and extra-axial spaces otherwise appear normal. No  intracranial hemorrhage or mass lesion, extra-axial fluid collection,  or abnormal enhancement.    Orbits appear normal. Mild mucosal thickening in the paranasal  sinuses. The skull base, mid face and calvarium otherwise appear  unremarkable.      Impression    IMPRESSION:  1. Single nonspecific focus of T2/T2 FLAIR hyperintense signal along  the lateral margin of the right thalamus and the posterior limb of the  right internal capsule, with differential as above.  2. Otherwise unremarkable MRI of the brain.    ALEX MELLO MD       I personally reviewed the brain MRI image(s) showing T2 FLAIR imaging shows a small right thalamic lesion that is not very bright.    Nitish Oseguera MD  [facility]

## 2020-04-27 NOTE — ED NOTES
Patient has  [place] to Observation  order. Patient has been given Patient Bill of Rights, Observation brochure and  What does Observation mean to me  forms.  Patient has been given the opportunity to ask questions about observation status and their plan of care.  Patient has been oriented to the observation room, bathroom, and call light is in place.    Maribell Del Rio RN

## 2020-04-27 NOTE — LETTER
[facility] SURGICAL  [address] [address]  Phone: [phone]  Fax: [phone]    April 28, 2020        Reanna Gilbert  [address] [address]          To whom it may concern:    RE: Reanna Gilbert    Patient was admitted overnight at our facility.  Please excuse her from work until 5/5/2020.  If she is clinically without symptoms and feeling better, she may return to work without restrictions before that time.    Please contact me for questions or concerns.      Sincerely,        Shirley Hull M.D.

## 2020-04-27 NOTE — ED NOTES
Pt reports on Saturday he had a HA with some nausea and dizziness. Pt reports she took some excedrin which helped the HA but continues to have nausea, poor appetite and dizziness.

## 2020-04-27 NOTE — ED PROVIDER NOTES
History     Chief Complaint   Patient presents with     Dizziness     Nausea     HPI  Reanna Gilbert is a 34 year old female who presents for evaluation for dizziness and nausea.  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.  She developed a headache 3 days prior and reports she woke up with a headache that is frontal and throbbing headache that did not improve  with over-the-counter medication.  She reports the sensation of dizziness like the room is spinning and swearing.  History of migraine headache in childhood but none since teenage years.  No prior history of closed head injury.  She has no fever no neck pain.  She reports nausea but no vomiting.  She has her menstrual cycle every 3 months.  She is on oral contraception and does not smoke.  Has sensation of dizziness seems to be somewhat positional and improved with rest and laying still.  She was at work and was encouraged to come to be assessed.  No extremity weakness or numbness.  No vomiting.    Allergies:  No Known Allergies    Problem List:    There are no active problems to display for this patient.       Past Medical History:    History reviewed. No pertinent past medical history.    Past Surgical History:    History reviewed. No pertinent surgical history.    Family History:    Family History   Problem Relation Age of Onset     Allergies Mother      Allergies Sister      Urticaria Sister      Allergies Sister        Social History:  Marital Status:  Single [1]  Social History     Tobacco Use     Smoking status: Never Smoker     Smokeless tobacco: Never Used   Substance Use Topics     Alcohol use: Yes     Comment: socially     Drug use: Never        Medications:    azelastine (ASTELIN) 0.1 % nasal spray  EPINEPHrine (ANY BX GENERIC EQUIV) 0.3 MG/0.3ML injection 2-pack  EPINEPHrine (AUVI-Q) 0.3 MG/0.3ML  injection 2-pack  fexofenadine (ALLEGRA) 180 MG tablet  fluticasone (FLONASE) 50 MCG/ACT nasal spray  ORDER FOR ALLERGEN IMMUNOTHERAPY  ORDER FOR ALLERGEN IMMUNOTHERAPY  ORDER FOR ALLERGEN IMMUNOTHERAPY  SETLAKIN 0.15-0.03 MG tablet          Review of Systems   Constitutional: Negative.    HENT: Negative.    Eyes: Negative.    Respiratory: Negative.    Cardiovascular: Negative.    Gastrointestinal: Positive for nausea.   Endocrine: Negative.    Genitourinary: Negative.    Musculoskeletal: Negative.    Skin: Negative.    Neurological: Positive for dizziness and headaches.   Hematological: Negative.    Psychiatric/Behavioral: Negative.    All other systems reviewed and are negative.      Physical Exam   BP: (!) 143/84  Pulse: 70  Heart Rate: 88  Temp: 98.5  F (36.9  C)  Resp: 16  Weight: 73.5 kg (162 lb)  SpO2: 100 %      Physical Exam  Constitutional:       Appearance: Normal appearance.   HENT:      Head: Normocephalic and atraumatic.      Right Ear: Tympanic membrane normal.      Left Ear: Tympanic membrane normal.      Nose: Nose normal. No congestion or rhinorrhea.      Mouth/Throat:      Mouth: Mucous membranes are moist.   Neck:      Musculoskeletal: Normal range of motion and neck supple. No neck rigidity or muscular tenderness.      Vascular: No carotid bruit.   Cardiovascular:      Rate and Rhythm: Normal rate.      Pulses: Normal pulses.      Heart sounds: Normal heart sounds. No murmur. No friction rub. No gallop.    Pulmonary:      Effort: Pulmonary effort is normal. No respiratory distress.      Breath sounds: Normal breath sounds. No stridor. No wheezing, rhonchi or rales.   Chest:      Chest wall: No tenderness.   Abdominal:      General: There is no distension.      Palpations: There is no mass.      Tenderness: There is no abdominal tenderness. There is no right CVA tenderness, left CVA tenderness, guarding or rebound.      Hernia: No hernia is present.   Musculoskeletal:         General: No  swelling, tenderness, deformity or signs of injury.      Right lower leg: No edema.   Lymphadenopathy:      Cervical: No cervical adenopathy.   Skin:     Capillary Refill: Capillary refill takes less than 2 seconds.      Coloration: Skin is not jaundiced or pale.      Findings: No bruising, erythema, lesion or rash.   Neurological:      General: No focal deficit present.      Mental Status: She is alert and oriented to person, place, and time. Mental status is at baseline.      GCS: GCS eye subscore is 4. GCS verbal subscore is 5. GCS motor subscore is 6.      Cranial Nerves: No cranial nerve deficit.      Sensory: No sensory deficit.      Motor: No weakness.      Coordination: Coordination normal.      Gait: Gait normal.      Deep Tendon Reflexes: Reflexes normal.   Psychiatric:         Mood and Affect: Mood normal.         Behavior: Behavior normal.         Thought Content: Thought content normal.         Judgment: Judgment normal.       National Institutes of Health Stroke Scale  Exam Interval: Baseline   Score    Level of consciousness: (0)   Alert, keenly responsive    LOC questions: (0)   Answers both questions correctly    LOC commands: (0)   Performs both tasks correctly    Best gaze: (0)   Normal    Visual: (0)   No visual loss    Facial palsy: (0)   Normal symmetrical movements    Motor arm (left): (0)   No drift    Motor arm (right): (0)   No drift    Motor leg (left): (0)   No drift    Motor leg (right): (0)   No drift    Limb ataxia: (0)   Absent    Sensory: (0)   Normal- no sensory loss    Best language: (0)   Normal- no aphasia    Dysarthria: (0)   Normal    Extinction and inattention: (0)   No abnormality        Total Score:  0       ED Course        Procedures               Critical Care time:  none               ED medications:  Medications   0.9% sodium chloride BOLUS (0 mLs Intravenous Stopped 4/27/20 1246)   ondansetron (ZOFRAN) injection 4 mg (4 mg Intravenous Given 4/27/20 1047)   meclizine  (ANTIVERT) tablet 25 mg (25 mg Oral Given 4/27/20 1145)   iopamidol (ISOVUE-370) solution 70 mL (70 mLs Intravenous Given 4/27/20 1111)   sodium chloride 0.9 % bag 500mL for CT scan flush use (100 mLs Intravenous Given 4/27/20 1111)   gadobutrol (GADAVIST) injection 7 mL (7 mLs Intravenous Given 4/27/20 1205)   aspirin (ASA) tablet 325 mg (325 mg Oral Given 4/27/20 1442)         ED vitals:  Vitals:    04/27/20 1345 04/27/20 1400 04/27/20 1415 04/27/20 1430   BP: 117/73 123/79 123/81 122/78   Pulse: 68 71 79 78   Resp:       Temp:       TempSrc:       SpO2: 100% 100% 100% 98%   Weight:           ED labs and imaging:  Results for orders placed or performed during the hospital encounter of 04/27/20   CT Head w/o Contrast     Status: None (Preliminary result)    Narrative    CT SCAN OF THE HEAD WITHOUT CONTRAST   4/27/2020 11:41 AM     HISTORY: 2-day history of headache, nausea, dizziness. Report of  unsteadiness of gait, on birth control. Evaluate for acute process.  Possible stroke.    TECHNIQUE:  Axial images of the head and coronal reformations without  IV contrast material. Radiation dose for this scan was reduced using  automated exposure control, adjustment of the mA and/or kV according  to patient size, or iterative reconstruction technique.    COMPARISON: None.    FINDINGS: The ventricles are normal in size and configuration. The  cerebral hemispheres, brainstem and cerebellum have a normal  morphology and attenuation. No extended signs of acute infarct are  seen. No intracranial hemorrhage, mass lesion or mass effect.    Visualized orbits appear normal. The paranasal sinuses, mastoids and  middle ear cavities are clear.      Impression    IMPRESSION:  No CT findings of acute intracranial abnormality.  Specifically, no extended signs of acute infarct, intracranial  hemorrhage, or mass effect.   CTA Head Neck with Contrast     Status: None (Preliminary result)    Narrative    CT ANGIOGRAM OF THE HEAD AND NECK  WITH CONTRAST  4/27/2020 11:43 AM     HISTORY: Headache, dizziness, nausea, report of unsteady gait.   Evaluate for acute process.     TECHNIQUE:  CT angiography with an injection of 70 mL Isovue 370 IV  with scans through the head and neck. Images were transferred to a  separate 3-D workstation where multiplanar reformations and 3-D images  were created. Estimates of carotid stenoses are made relative to the  distal internal carotid artery diameters except as noted. Radiation  dose for this scan was reduced using automated exposure control,  adjustment of the mA and/or kV according to patient size, or iterative  reconstruction technique.     COMPARISON: None.     CT HEAD FINDINGS: No contrast enhancing lesions. Cerebral blood flow  is grossly normal.     CT ANGIOGRAM HEAD FINDINGS:  The major intracranial arteries including  the proximal branches of the anterior cerebral, middle cerebral, and  posterior cerebral arteries appear patent without vascular cutoff. No  aneurysm identified. No significant stenosis. Venous circulation is  unremarkable.     CT ANGIOGRAM NECK FINDINGS:   Common origin of the brachiocephalic and left common carotid arteries  from the aortic arch, a normal variant.     Right carotid artery: The right common and internal carotid arteries  are patent. No significant stenosis or atherosclerotic disease in the  carotid artery.     Left carotid artery: The left common and internal carotid arteries are  patent. No significant stenosis or atherosclerotic disease in the  carotid artery.     Vertebral arteries: Vertebral arteries are patent without evidence of  dissection. No significant stenosis.     Other findings: None.       Impression    IMPRESSION: Patent arteries in the head and neck without vascular  cutoff. No evidence of dissection. No aneurysm identified. No  significant stenosis.    MR Brain w/o & w Contrast     Status: None (Preliminary result)    Narrative    MRI BRAIN WITHOUT AND WITH  CONTRAST  4/27/2020 12:26 PM     HISTORY:  Headache, nausea, dizziness x 3 days, report of unsteady  gait.  On birth control.  Evaluate for acute process possible stroke.     TECHNIQUE:  Multiplanar, multisequence MRI of the brain without and  with 7 mL Gadavist.     COMPARISON: CT head and CT angiogram of the head and neck of same  date.    FINDINGS: There is a single nonspecific focus of mild T2/T2 FLAIR  hyperintense signal along the lateral margin of the right thalamus at  its junction with the posterior limb of the right internal capsule.  This is nonspecific. It could be sequela of an old nonspecific insult  such as a lacunar infarct, although this would be unusual in a patient  of this age. It could represent an atypical appearance of a dilated  perivascular space, focus of chronic demyelination, or less likely  early chronic small vessel ischemic change. This does not appear to be  an acute lesion. No expansile quality or mass effect. No associated  enhancement, susceptibility artifact or restricted diffusion.    The ventricles are normal in size and configuration. The brain  parenchyma and extra-axial spaces otherwise appear normal. No  intracranial hemorrhage or mass lesion, extra-axial fluid collection,  or abnormal enhancement.    Orbits appear normal. Mild mucosal thickening in the paranasal  sinuses. The skull base, mid face and calvarium otherwise appear  unremarkable.      Impression    IMPRESSION:  1. Single nonspecific focus of T2/T2 FLAIR hyperintense signal along  the lateral margin of the right thalamus and the posterior limb of the  right internal capsule, with differential as above.  2. Otherwise unremarkable MRI of the brain.   CBC with platelets differential     Status: None   Result Value Ref Range    WBC 6.9 4.0 - 11.0 10e9/L    RBC Count 4.98 3.8 - 5.2 10e12/L    Hemoglobin 15.3 11.7 - 15.7 g/dL    Hematocrit 46.5 35.0 - 47.0 %    MCV 93 78 - 100 fl    MCH 30.7 26.5 - 33.0 pg    MCHC 32.9  31.5 - 36.5 g/dL    RDW 12.4 10.0 - 15.0 %    Platelet Count 236 150 - 450 10e9/L    Diff Method Automated Method     % Neutrophils 67.4 %    % Lymphocytes 23.9 %    % Monocytes 5.4 %    % Eosinophils 2.3 %    % Basophils 0.9 %    % Immature Granulocytes 0.1 %    Nucleated RBCs 0 0 /100    Absolute Neutrophil 4.6 1.6 - 8.3 10e9/L    Absolute Lymphocytes 1.6 0.8 - 5.3 10e9/L    Absolute Monocytes 0.4 0.0 - 1.3 10e9/L    Absolute Eosinophils 0.2 0.0 - 0.7 10e9/L    Absolute Basophils 0.1 0.0 - 0.2 10e9/L    Abs Immature Granulocytes 0.0 0 - 0.4 10e9/L    Absolute Nucleated RBC 0.0    Basic metabolic panel     Status: None   Result Value Ref Range    Sodium 139 133 - 144 mmol/L    Potassium 3.8 3.4 - 5.3 mmol/L    Chloride 109 94 - 109 mmol/L    Carbon Dioxide 22 20 - 32 mmol/L    Anion Gap 8 3 - 14 mmol/L    Glucose 84 70 - 99 mg/dL    Urea Nitrogen 10 7 - 30 mg/dL    Creatinine 0.67 0.52 - 1.04 mg/dL    GFR Estimate >90 >60 mL/min/[1.73_m2]    GFR Estimate If Black >90 >60 mL/min/[1.73_m2]    Calcium 9.5 8.5 - 10.1 mg/dL   HCG qualitative urine     Status: None   Result Value Ref Range    HCG Qual Urine Negative NEG^Negative         Assessments & Plan (with Medical Decision Making)   Clinical impression: 34-year-old female with a history of allergies on immunotherapy and oral contraception, non-smoker who presented with report of dizziness, headache and nausea over the last 3 days.  The cause of her symptoms as reported and described is not clear.  Neuroimaging suggest a possible lacunar infarct on brain MRI and patient is admitted to medicine service for further work-up in the context of birth control use after presenting with  symptoms of dizziness, headache and episode of unsteadiness in her gait.    Headache was described as sudden onset throbbing discomfort in the frontal area, dizziness improved with laying still and worsened with sudden position movements (non-directional).  Awoke from sleep 72 hours with  headache, without extremity numbness or weakness, no speech occultly no visual disturbance.  She had no red flags on exam specifically no fever chills no neck pain and a normal gross neurologic examination, NIH stroke score is 0.  She had a negative Kristina-Hallpike test and a negative head impulse test.  She reported episodes of unsteadiness in her gait.  GCS was 15 on arrival and serially during her ED course.  Her blood pressure was somewhat elevated on initial arrival 143/84. Patient reported no tinnitus.      ED course and Plan:  Reviewed the medical record.  Discussed possible causes for vestibular syndrome her history was suggestive of peripheral vertigo although she had a negative Orchard-Hallpike test.We discussed additional options for imaging and work-up.  Patient requested head imaging.  She was offered fluids Zofran and meclizine for dizziness as reported.   Work-up in the department revealed a negative urine pregnancy, normal electrolyte panel and a normal hemogram.  Head imaging was reassuring with a normal multimodal CT- negative head CT and negative CT angiogram of the head and neck.      MRI of the brain with and without contrast revealed a single nonspecific focus of T2 T2 flair hyperintense signal along the lateral margin of the right thalamus and posterior limb of the right internal capsule the differential was felt to be broad based on patient's age see additional detail in the report above.  I reviewed MRI findings with the stroke neurologist on duty- Dr Samano- at 2.27pm.  He reviewed patient's neuroimaging and advised that patient be admitted due to concern about possible lacunar infarct with consideration for hypercoagulable work-up and an echocardiogram.  He recommended daily aspirin (325mg), and likely correlation with patient's OCP use may need to discontinue.  See stroke neurology consult note for additional details of the recommendation    I reviewed my consultation with stroke neurology and  patient's history work-up and imaging in the department with Dr INO Oseguera- admitting hospitalist at  3.10pm who agreed to assume care upon handoff for admission for further evaluation care as advised and suggested by stroke neurology. Defer additional diagnostic testing to the discretion and direction of the treating or evaluating hospitalist.    Plan of care was reviewed with the patient who expressed comfort and agreement plan of care. Patient is signed out to Dr MICK Solorzano at shift end at 3.15 pm awaiting transfer to medical floor for further care        Disclaimer: This note consists of symbols derived from keyboarding, dictation and/or voice recognition software. As a result, there may be errors in the script that have gone undetected. Please consider this when interpreting information found in this chart.  I have reviewed the nursing notes.    I have reviewed the findings, diagnosis, plan and need for follow up with the patient.       New Prescriptions    No medications on file       Final diagnoses:   Dizziness - over the last 3 days. Uncertain cause.   Nausea - over the last 3 days   Acute nonintractable headache, unspecified headache type - over the last 3 days, upon awakening-4/25/20   Abnormal MRI - Area of hyperintensity over the limb of the right internal capsule and right thalamus       4/27/2020   [facility] EMERGENCY DEPARTMENT     Frankie Salgado MD  04/27/20 0787

## 2020-04-28 ENCOUNTER — APPOINTMENT (OUTPATIENT)
Dept: CARDIOLOGY | Facility: CLINIC | Age: 35
End: 2020-04-28
Attending: INTERNAL MEDICINE
Payer: COMMERCIAL

## 2020-04-28 ENCOUNTER — APPOINTMENT (OUTPATIENT)
Dept: PHYSICAL THERAPY | Facility: CLINIC | Age: 35
End: 2020-04-28
Payer: COMMERCIAL

## 2020-04-28 VITALS
TEMPERATURE: 98.7 F | BODY MASS INDEX: 28.79 KG/M2 | RESPIRATION RATE: 16 BRPM | HEIGHT: 63 IN | SYSTOLIC BLOOD PRESSURE: 120 MMHG | DIASTOLIC BLOOD PRESSURE: 71 MMHG | OXYGEN SATURATION: 100 % | WEIGHT: 162.48 LBS | HEART RATE: 87 BPM

## 2020-04-28 LAB
AT III ACT/NOR PPP CHRO: 94 % (ref 85–135)
CARDIOLIPIN ANTIBODY IGG: <1.6 GPL-U/ML (ref 0–19.9)
CARDIOLIPIN ANTIBODY IGM: 0.5 MPL-U/ML (ref 0–19.9)
CHOLEST SERPL-MCNC: 187 MG/DL
HDLC SERPL-MCNC: 56 MG/DL
LDLC SERPL CALC-MCNC: 106 MG/DL
NONHDLC SERPL-MCNC: 131 MG/DL
SARS-COV-2 PCR COMMENT: NORMAL
SARS-COV-2 RNA SPEC QL NAA+PROBE: NEGATIVE
SARS-COV-2 RNA SPEC QL NAA+PROBE: NORMAL
SPECIMEN SOURCE: NORMAL
SPECIMEN SOURCE: NORMAL
TRIGL SERPL-MCNC: 127 MG/DL

## 2020-04-28 PROCEDURE — 97161 PT EVAL LOW COMPLEX 20 MIN: CPT | Mod: GP

## 2020-04-28 PROCEDURE — 99207 ZZC CDG-CODE CATEGORY CHANGED: CPT | Performed by: FAMILY MEDICINE

## 2020-04-28 PROCEDURE — 25000132 ZZH RX MED GY IP 250 OP 250 PS 637: Performed by: INTERNAL MEDICINE

## 2020-04-28 PROCEDURE — 25000132 ZZH RX MED GY IP 250 OP 250 PS 637: Performed by: FAMILY MEDICINE

## 2020-04-28 PROCEDURE — G0378 HOSPITAL OBSERVATION PER HR: HCPCS

## 2020-04-28 PROCEDURE — 93306 TTE W/DOPPLER COMPLETE: CPT | Mod: 26 | Performed by: INTERNAL MEDICINE

## 2020-04-28 PROCEDURE — 36415 COLL VENOUS BLD VENIPUNCTURE: CPT | Performed by: INTERNAL MEDICINE

## 2020-04-28 PROCEDURE — 99207 ZZC MOONLIGHTING INDICATOR: CPT | Performed by: FAMILY MEDICINE

## 2020-04-28 PROCEDURE — 40000264 ECHOCARDIOGRAM COMPLETE

## 2020-04-28 PROCEDURE — 25000128 H RX IP 250 OP 636: Performed by: EMERGENCY MEDICINE

## 2020-04-28 PROCEDURE — 95992 CANALITH REPOSITIONING PROC: CPT | Mod: GP

## 2020-04-28 PROCEDURE — 80061 LIPID PANEL: CPT | Performed by: INTERNAL MEDICINE

## 2020-04-28 PROCEDURE — 99217 ZZC OBSERVATION CARE DISCHARGE: CPT | Performed by: FAMILY MEDICINE

## 2020-04-28 RX ORDER — ASPIRIN 81 MG/1
243 TABLET, CHEWABLE ORAL ONCE
Status: COMPLETED | OUTPATIENT
Start: 2020-04-28 | End: 2020-04-28

## 2020-04-28 RX ORDER — ASPIRIN 325 MG
325 TABLET, DELAYED RELEASE (ENTERIC COATED) ORAL DAILY
Qty: 100 TABLET | Refills: 3 | Status: ON HOLD | OUTPATIENT
Start: 2020-04-28 | End: 2022-07-22

## 2020-04-28 RX ADMIN — ASPIRIN 81 MG: 81 TABLET, COATED ORAL at 09:02

## 2020-04-28 RX ADMIN — ASPIRIN 81 MG 243 MG: 81 TABLET ORAL at 12:03

## 2020-04-28 RX ADMIN — ONDANSETRON 4 MG: 4 TABLET, ORALLY DISINTEGRATING ORAL at 12:04

## 2020-04-28 NOTE — PROVIDER NOTIFICATION
DATE:  4/28/2020   TIME OF RECEIPT FROM LAB:  0235  LAB TEST:  COVID  LAB VALUE:  Negative  RESULTS GIVEN WITH READ-BACK TO (PROVIDER):  Nitish Oseguera MD  TIME LAB VALUE REPORTED TO PROVIDER:   0240

## 2020-04-28 NOTE — PROGRESS NOTES
[facility] [facility] DISCHARGE NOTE    Patient discharged to home at 1:50 PM via wheel chair. Accompanied by other:N/A and staff. Discharge instructions reviewed with patient, opportunity offered to ask questions. Prescriptions sent to patients preferred pharmacy. All belongings sent with patient.    Mare Gan RN

## 2020-04-28 NOTE — PROGRESS NOTES
Physical Therapy Evaluation and Discharge Summary       04/28/20 1100   Quick Adds   Type of Visit Initial PT Evaluation   Functional Level Prior   Ambulation 0-->independent   Transferring 0-->independent   Toileting 0-->independent   Bathing 0-->independent   Communication 0-->understands/communicates without difficulty   Swallowing 0-->swallows foods/liquids without difficulty   Cognition 0 - no cognition issues reported   Fall history within last six months no   General Information   Onset of Illness/Injury or Date of Surgery - Date 04/27/20   Referring Physician Dr Anguiano   Patient/Family Goals Statement get rid of dizziness   Pertinent History of Current Problem (include personal factors and/or comorbidities that impact the POC) Per H&P:  Reanna Gilbert is a 34 year old female who presents for evaluation for dizziness and nausea.  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  She developed a headache 3 days prior and reports she woke up with a headache that is frontal and throbbing headache that did not improve  with over-the-counter medication.  She reports the sensation of dizziness like the room is spinning and swearing.  History of migraine headache in childhood but none since teenage years.  No prior history of closed head injury.  She has no fever no neck pain.  She reports nausea but no vomiting.  She has her menstrual cycle every 3 months.  She is on oral contraception and does not smoke.  Has sensation of dizziness seems to be somewhat positional and improved with rest and laying still.  She was at work and was encouraged to come to be assessed.  No extremity weakness or numbness.  No vomiting.   Precautions/Limitations no known precautions/limitations   General Observations R Leming Hallpike +, negative VOR, saccades, gaze invoked nystagmus.   General Info  "Comments Pt reports dizziness with bending over, looking up, rolling in bed. Lasts for a few seconds. Denies having this before. Denies problems with balance, walking or driving.   Cognitive Status Examination   Orientation orientation to person, place and time   Level of Consciousness alert   Follows Commands and Answers Questions 100% of the time   Personal Safety and Judgment intact   Pain Assessment   Patient Currently in Pain No   Posture    Posture Not impaired   Range of Motion (ROM)   ROM Quick Adds No deficits were identified   Strength   Manual Muscle Testing Quick Adds No deficits were identified   Bed Mobility   Bed Mobility Comments independent   Transfer Skills   Transfer Comments independent   Gait   Gait Comments independent   Balance   Balance no deficits were identified   General Therapy Interventions   Planned Therapy Interventions other (see comments)   Intervention Comments CTR for R posterior BPPV x 3   Clinical Impression   Criteria for Skilled Therapeutic Intervention yes, treatment indicated   PT Diagnosis R posterior canal BPPV   Influenced by the following impairments dizziness   Functional limitations due to impairments bending over, looking up, rolling in bed   Clinical Presentation Stable/Uncomplicated   Clinical Presentation Rationale clinical judgement   Clinical Decision Making (Complexity) Low complexity   Therapy Frequency Daily   Predicted Duration of Therapy Intervention (days/wks) 1 visit. Pt to be d/c home today. Pt instructed to schedule with OP PT if sxs linger for a week.   Anticipated Discharge Disposition Home   Risk & Benefits of therapy have been explained Yes   Patient, Family & other staff in agreement with plan of care Yes   [employer] [facility] [employer] TM \"6 Clicks\"   2016, Trustees of [employer] [facility], under license to [employer].  All rights reserved.   6 Clicks Short Forms Basic Mobility Inpatient Short Form   [employer] [facility] AM-[facility]AC  \"6 Clicks\" V.2 Basic Mobility " Inpatient Short Form   1. Turning from your back to your side while in a flat bed without using bedrails? 4 - None   2. Moving from lying on your back to sitting on the side of a flat bed without using bedrails? 4 - None   3. Moving to and from a bed to a chair (including a wheelchair)? 4 - None   4. Standing up from a chair using your arms (e.g., wheelchair, or bedside chair)? 4 - None   5. To walk in hospital room? 4 - None   6. Climbing 3-5 steps with a railing? 4 - None   Basic Mobility Raw Score (Score out of 24.Lower scores equate to lower levels of function) 24   Total Evaluation Time   Total Evaluation Time (Minutes) 15     Anamaria Keane PT

## 2020-04-28 NOTE — DISCHARGE SUMMARY
[facility]  Hospitalist Discharge Summary      Date of Admission:  4/27/2020  Date of Discharge:  4/28/2020  Discharging Provider: Shirley Hull MD      Discharge Diagnoses   BPPV  Possible Stroke, abnormal MRI  Headache, resolved    Follow-ups Needed After Discharge   Follow-up Appointments     Follow-up and recommended labs and tests       Follow up with primary care provider, Physician No Ref-Primary, within 7   days for hospital follow- up.  No follow up labs or test are needed.  Follow up with Neurology             Unresulted Labs Ordered in the Past 30 Days of this Admission     Date and Time Order Name Status Description    4/27/2020 1847 Beta 2 Glycoprotein 1 Antibody IgG In process     4/27/2020 1847 Beta 2 Glycoprotein 1 Antibody IgM In process     4/27/2020 1847 Factor 2 and 5 mutation analysis In process     4/27/2020 1847 Lupus Anticoagulant Panel In process     4/27/2020 1847 Protein C chromogenic In process     4/27/2020 1847 Protein S Antigen Free In process       These results will be followed up by PCP    Discharge Disposition   Discharged to home  Condition at discharge: Stable      Hospital Course   Reanna Gilbert is a 34 year old female with past medical history significant for seasonal allergies who now presents on 4/27/2020 with 2 days of intermittent room spinning dizziness.    Possible stroke (H)    Headache and intermittent room spinning dizziness onset morning of 4/25. Headache resolved with Excedrin but not intermittent dizziness. Negative Jakin-Hallpike exam in ED.     MRI positive for single nonspecific focus of T2/T2 FLAIR hyperintense signal along the lateral margin of the right thalamus and the posterior limb of the right internal capsule, not acute. May be subacute CVA possibly lacunar type though would be unusual for her age and no h/o hypertension. Case discussed with stroke neurology in the ED and recommended observation, echo, telemetry, A1c, FLP  and hypercoagulable work up. No focal neurologic findings on exam and no current symptoms. Patient on OCP which can increase thrombosis risk.     There are reports of CVA as first sign of COVID19 (see below).     Possible MRI finding is incidental and not related to her symptoms. Demyelination lesion possible but only one lesion seen.    - aspirin as recommended   - FLP, A1c   - hypercoag work up   - tele, echo with bubble   - physical therapy and occupational therapy evals   - stop OCP   - COVID19 testing    - PT did see patient and found positive DixHallpike maneuver - symptoms are very positional with head movement.  Did CTR for Right posterior BPPV x 3 and did reproduce symptoms.  -PT for vestibular rehabilitation if not resolved completely by Friday - 3 days from now  -given MRI abnormality, would recommend aspirin 325 mg daily and neurology follow up.  The hypercoag w/u is underway.  She has stopped her oral contraceptive pill.   -lipids and HgbA1c are normal  -echo with normal EF, no valvular disease an no obvious clot.  -telemetry normal.    COVID19 rule out    Patient is in healthcare (radiology technician). 3 known COVID exposures but no break in PPE. No systemic signs or symptoms of COVID except possibly CVA as above. Pretest COVID suspicion is low.   - check COVID PCR   - check d-dimer  Addendum: COVID testing is negative. No indication for repeating test. Can discontinue COVID precautions.     Headache    Occasional headaches last 4 weeks but not as severe as on Saturday. Now resolved after Excedrin x 1. No recent h/o migraines but has as young child. No aura, photosensitivity, visual changes.    - follow for recurrence    Dizziness    Vertiginous episodes occurring with movements and short-lived when they occur. Possibly related to MRI findings but not clear. [place] negative.    - physical therapy eval, follow for recurrence  -as above, likely BPPV    Seasonal and environmental allergies    Patient  had started allergy injections for desensitization this winter, but she had to stop after only 3 doses due to the social distancing policies in the clinic.  She takes over-the-counter antihistamines regularly.    DVT Prophylaxis: Low Risk/Ambulatory with no VTE prophylaxis indicated  Code Status: Full Code    Lines: PIV   Presley catheter: None     Disposition: Home today      Consultations This Hospital Stay   PHYSICAL THERAPY ADULT IP CONSULT  OCCUPATIONAL THERAPY ADULT IP CONSULT    Code Status   Full Code    Time Spent on this Encounter   I, Shirley Hull MD, personally saw the patient today and spent greater than 30 minutes discharging this patient.       Shirley Hull MD  [facility]  ______________________________________________________________________    Physical Exam   Vital Signs: Temp: 98.7  F (37.1  C) Temp src: Oral BP: 120/71 Pulse: 87 Heart Rate: 71 Resp: 16 SpO2: 100 % O2 Device: None (Room air)    Weight: 162 lbs 7.66 oz  Constitutional: awake, alert, cooperative, no apparent distress, and appears stated age  Eyes: Lids and lashes normal, pupils equal, round and reactive to light, extra ocular muscles intact, sclera clear, conjunctiva normal  ENT: normocepalic, without obvious abnormality  Hematologic / Lymphatic: no cervical lymphadenopathy and no supraclavicular lymphadenopathy  Respiratory: No increased work of breathing, good air exchange, clear to auscultation bilaterally, no crackles or wheezing  Cardiovascular: Normal apical impulse, regular rate and rhythm, normal S1 and S2, no S3 or S4, and no murmur noted  GI: No scars, normal bowel sounds, soft, non-distended, non-tender, no masses palpated, no hepatosplenomegally    Skin: no bruising or bleeding  Musculoskeletal: no lower extremity pitting edema present  Neurologic: Awake, alert, oriented to name, place and time.  Cranial nerves II-XII are grossly intact.  Motor is 5 out of 5 bilaterally.  Cerebellar  finger to nose, heel to shin intact.  Sensory is intact.  Babinski down going, Romberg negative, and gait is normal.  Neuropsychiatric: General: normal, calm and normal eye contact  Level of consciousness: alert / normal  Affect: normal       Primary Care Physician   Physician No Ref-Primary    Discharge Orders      Physical Therapy Referral      NEUROLOGY ADULT REFERRAL      Reason for your hospital stay    You were admitted for possible stroke.  Further evaluation with physical therapy makes it more likely that this is BPPV (positional vertigo) and an incidental finding on your MRI.  I would be reasonable however to follow up with Neurology to get their opinion on this.    Ericka Larkin MD Vertigo Treatment Oct 11 - this is a YouTube video that you may find helpful.  If symptoms continue into the end of this week, please schedule to see physical therapy for vestibular rehabilitation.     Follow-up and recommended labs and tests     Follow up with primary care provider, Physician No Ref-Primary, within 7 days for hospital follow- up.  No follow up labs or test are needed.  Follow up with Neurology     Activity    Your activity upon discharge: activity as tolerated     Full Code     Diet    Follow this diet upon discharge: Orders Placed This Encounter      Regular Diet Adult       Significant Results and Procedures   Most Recent 3 CBC's:  Recent Labs   Lab Test 04/27/20  1045   WBC 6.9   HGB 15.3   MCV 93        Most Recent 2 LFT's:No lab results found.,   Results for orders placed or performed during the hospital encounter of 04/27/20   CT Head w/o Contrast    Narrative    CT SCAN OF THE HEAD WITHOUT CONTRAST   4/27/2020 11:41 AM     HISTORY: 2-day history of headache, nausea, dizziness. Report of  unsteadiness of gait, on birth control. Evaluate for acute process.  Possible stroke.    TECHNIQUE:  Axial images of the head and coronal reformations without  IV contrast material. Radiation dose for this scan  was reduced using  automated exposure control, adjustment of the mA and/or kV according  to patient size, or iterative reconstruction technique.    COMPARISON: None.    FINDINGS: The ventricles are normal in size and configuration. The  cerebral hemispheres, brainstem and cerebellum have a normal  morphology and attenuation. No extended signs of acute infarct are  seen. No intracranial hemorrhage, mass lesion or mass effect.    Visualized orbits appear normal. The paranasal sinuses, mastoids and  middle ear cavities are clear.      Impression    IMPRESSION:  No CT findings of acute intracranial abnormality.  Specifically, no extended signs of acute infarct, intracranial  hemorrhage, or mass effect.    ALEX MELLO MD   CTA Head Neck with Contrast    Narrative    CT ANGIOGRAM OF THE HEAD AND NECK WITH CONTRAST  4/27/2020 11:43 AM     HISTORY: Headache, dizziness, nausea, report of unsteady gait.   Evaluate for acute process.     TECHNIQUE:  CT angiography with an injection of 70 mL Isovue 370 IV  with scans through the head and neck. Images were transferred to a  separate 3-D workstation where multiplanar reformations and 3-D images  were created. Estimates of carotid stenoses are made relative to the  distal internal carotid artery diameters except as noted. Radiation  dose for this scan was reduced using automated exposure control,  adjustment of the mA and/or kV according to patient size, or iterative  reconstruction technique.     COMPARISON: CT head of same date.     CT HEAD FINDINGS: No contrast enhancing lesions. Cerebral blood flow  is grossly normal.     CT ANGIOGRAM HEAD FINDINGS:  The major intracranial arteries including  the proximal branches of the anterior cerebral, middle cerebral, and  posterior cerebral arteries appear patent without vascular cutoff. No  aneurysm identified. No significant stenosis. Venous circulation is  unremarkable.     CT ANGIOGRAM NECK FINDINGS:   Common origin of the  brachiocephalic and left common carotid arteries  from the aortic arch, a normal variant.     Right carotid artery: The right common and internal carotid arteries  are patent. No significant stenosis or atherosclerotic disease in the  carotid artery.     Left carotid artery: The left common and internal carotid arteries are  patent. No significant stenosis or atherosclerotic disease in the  carotid artery.     Vertebral arteries: Vertebral arteries are patent without evidence of  dissection. No significant stenosis.     Other findings: None.       Impression    IMPRESSION: Patent arteries in the head and neck without vascular  cutoff. No evidence of dissection. No aneurysm identified. No  significant stenosis.     ALEX MELLO MD   MR Brain w/o & w Contrast    Narrative    MRI BRAIN WITHOUT AND WITH CONTRAST  4/27/2020 12:26 PM     HISTORY:  Headache, nausea, dizziness x 3 days, report of unsteady  gait.  On birth control.  Evaluate for acute process possible stroke.     TECHNIQUE:  Multiplanar, multisequence MRI of the brain without and  with 7 mL Gadavist.     COMPARISON: CT head and CT angiogram of the head and neck of same  date.    FINDINGS: There is a single nonspecific focus of mild T2/T2 FLAIR  hyperintense signal along the lateral margin of the right thalamus at  its junction with the posterior limb of the right internal capsule.  This is nonspecific. It could be sequela of an old nonspecific insult  such as a lacunar infarct, although this would be unusual in a patient  of this age. It could represent an atypical appearance of a dilated  perivascular space, focus of chronic demyelination, or less likely  early chronic small vessel ischemic change. This does not appear to be  an acute lesion. No expansile quality or mass effect. No associated  enhancement, susceptibility artifact or restricted diffusion.    The ventricles are normal in size and configuration. The brain  parenchyma and extra-axial spaces  otherwise appear normal. No  intracranial hemorrhage or mass lesion, extra-axial fluid collection,  or abnormal enhancement.    Orbits appear normal. Mild mucosal thickening in the paranasal  sinuses. The skull base, mid face and calvarium otherwise appear  unremarkable.      Impression    IMPRESSION:  1. Single nonspecific focus of T2/T2 FLAIR hyperintense signal along  the lateral margin of the right thalamus and the posterior limb of the  right internal capsule, with differential as above.  2. Otherwise unremarkable MRI of the brain.    ALEX MELLO MD   Echocardiogram Complete    Narrative    [id number]  [facility]  [id number]  [id number]^KIMBERLY^GWYN^MICAH           [facility]  Echocardiography Laboratory  [address] [place] [address]        Name: ELLEN MEEKS  MRN: [id number]  : 1985  Study Date: 2020 08:02 AM  Age: 34 yrs  Gender: Female  Patient Location: [facility]  Reason For Study: CVA  Ordering Physician: GWYN HARRIS  Performed By: Helen Hatch RDCS     BSA: 1.8 m2  Height: 63 in  Weight: 162 lb  HR: 71  BP: 122/80 mmHg  _____________________________________________________________________________  __        Procedure  Complete Portable Bubble Echo Adult.  _____________________________________________________________________________  __        Interpretation Summary     1. The left ventricle is normal in structure, function and size. The visual  ejection fraction is estimated at 60%.  2. The right ventricle is normal in structure, function and size.  3. There is no atrial shunt seen. A contrast injection (Bubble Study) was  performed that was negative for flow across the interatrial septum.  4. No valve disease.     No previous echo for comparison.  _____________________________________________________________________________  __        Left Ventricle  The left ventricle is normal in structure, function and size. There is normal  left ventricular wall thickness. The  visual ejection fraction is estimated at  60%. Left ventricular diastolic function is normal. Normal left ventricular  wall motion.     Right Ventricle  The right ventricle is normal in structure, function and size.     Atria  Normal left atrial size. Right atrial size is normal. There is no atrial shunt  seen. A contrast injection (Bubble Study) was performed that was negative for  flow across the interatrial septum.     Mitral Valve  The mitral valve is normal in structure and function.        Tricuspid Valve  The tricuspid valve is normal in structure and function.     Aortic Valve  The aortic valve is normal in structure and function.     Pulmonic Valve  The pulmonic valve is normal in structure and function.     Vessels  Normal ascending, transverse (arch), and descending aorta. The inferior vena  cava was normal in size with preserved respiratory variability.     Pericardium  There is no pericardial effusion.        Rhythm  Sinus rhythm was noted.  _____________________________________________________________________________  __  MMode/2D Measurements & Calculations     IVSd: 0.85 cm  LVIDd: 4.2 cm  LVIDs: 2.6 cm  LVPWd: 0.74 cm  FS: 37.8 %  LV mass(C)d: 100.7 grams  LV mass(C)dI: 56.9 grams/m2  Ao root diam: 2.8 cm  LA dimension: 2.8 cm  asc Aorta Diam: 2.6 cm  LA/Ao: 0.99  LA Volume (BP): 23.0 ml  LA Volume Index (BP): 13.0 ml/m2  RWT: 0.35           Doppler Measurements & Calculations  MV E max weston: 74.4 cm/sec  MV A max weston: 50.7 cm/sec  MV E/A: 1.5  MV dec time: 0.17 sec  PA acc time: 0.12 sec  E/E' av.3  Lateral E/e': 6.0  Medial E/e': 6.6           _____________________________________________________________________________  __           Report approved by: Cade Wyman 2020 08:45 AM            Discharge Medications   Current Discharge Medication List      START taking these medications    Details   aspirin (ASA) 325 MG EC tablet Take 1 tablet (325 mg) by mouth daily  Qty: 100  tablet, Refills: 3    Associated Diagnoses: Cerebrovascular accident (CVA), unspecified mechanism (H)         CONTINUE these medications which have NOT CHANGED    Details   azelastine (ASTELIN) 0.1 % nasal spray Spray 2 sprays into both nostrils 2 times daily as needed for rhinitis  Qty: 30 mL, Refills: 3    Associated Diagnoses: Allergic rhinitis due to dust mite      fluticasone (FLONASE) 50 MCG/ACT nasal spray Spray 2 sprays into both nostrils daily  Qty: 16 g, Refills: 3    Associated Diagnoses: Allergic rhinitis due to dust mite      EPINEPHrine (ANY BX GENERIC EQUIV) 0.3 MG/0.3ML injection 2-pack Inject 0.3 mLs (0.3 mg) into the muscle as needed for anaphylaxis  Qty: 0.6 mL, Refills: 3    Associated Diagnoses: Allergic rhinitis caused by mold; Seasonal allergic rhinitis due to pollen; Allergic rhinitis due to animals; Allergic rhinitis due to dust mite      fexofenadine (ALLEGRA) 180 MG tablet Take 180 mg by mouth daily       !! ORDER FOR ALLERGEN IMMUNOTHERAPY Name of Mix: Mix #1  Tree , Weeds  Birch Mix PRW 1:20 w/v, HS  0.5 ml  Boxelder-Maple Mix BHR (Boxelder Hard Red) 1:20 w/v, HS  0.5 ml  Latimer, Common 1:20 w/v, HS  0.5 ml  Pine, White 1:20 w/v, ALK 0.5 ml  Kochia 1:20 w/v, HS 0.5 ml  Plantain, English 1:20 w/v, HS 0.5 ml  Ragweed Mixed 1:20 w/v ALK  0.5 ml  Diluent: HSA qs to 5ml  Qty: 5 mL, Refills: PRN    Associated Diagnoses: Seasonal allergic rhinitis due to pollen      !! ORDER FOR ALLERGEN IMMUNOTHERAPY Name of Mix: Mix #2  Dust Mite, Cat, Dog, Grass  Cat Hair, Standardized 10,000 BAU/mL, ALK  2.0 ml  Dog Hair-Dander, A. P.  1:100 w/v, HS  1.0 ml  Dust Mites DP. 10,000 AU/mL, HS  0.5 ml   Adis Grass (Std) 100,000 BAU/mL, HS 0.3 ml  Diluent: HSA qs to 5ml  Qty: 5 mL, Refills: PRN    Associated Diagnoses: Seasonal allergic rhinitis due to pollen; Allergic rhinitis due to dust mite; Allergic rhinitis due to animals      !! ORDER FOR ALLERGEN IMMUNOTHERAPY Name of Mix: Mix #3  Mold  Epicoccum  Nigrum 1:10 w/v, HS 0.5 ml  Diluent: HSA qs to 5ml  Qty: 5 mL, Refills: PRN    Associated Diagnoses: Allergic rhinitis caused by mold       !! - Potential duplicate medications found. Please discuss with provider.      STOP taking these medications       SETLAKIN 0.15-0.03 MG tablet Comments:   Reason for Stopping:             Allergies   No Known Allergies

## 2020-04-28 NOTE — PLAN OF CARE
Patient resting most of the night. This morning she does tell recorder she had a little bit of nausea throughout the night, but no pain at this time. Patient notified of COVID coming back negative after MD was notified and isolation discontinued. Patient has no questions or complaints at this time. Patient is ambulating independently win her room with no complications. No HA during this shift. IV SL and patent. Voiding and handling clears well. Will most likely advance to regular diet this morning and see how she tolerates it.

## 2020-04-28 NOTE — PROGRESS NOTES
"[place] [facility] ADMISSION NOTE    Patient admitted to room 2404 at approximately 1845 via wheel chair from emergency room. Patient was accompanied by transport tech.     Verbal SBAR report received from ADDI Ramirez prior to patient arrival.     Patient ambulated to bed independently. Patient alert and oriented X 3. The patient is not having any pain. 0-10 Pain Scale: 0. Admission vital signs: Blood pressure 124/76, pulse 75, temperature 98.5  F (36.9  C), temperature source Oral, resp. rate 16, height 1.6 m (5' 3\"), weight 73.7 kg (162 lb 7.7 oz), SpO2 99 %. Patient was oriented to plan of care, call light, bed controls, tv, telephone, bathroom and visiting hours.     Risk Assessment    The following safety risks were identified during admission: none. Yellow risk band applied: NO.     Skin Initial Assessment    This writer admitted this patient and completed a full skin assessment and Reginaldo score in the Adult PCS flowsheet. Appropriate interventions initiated as needed.     Skin check refused by patient.          Education    Patient has a [place] to Observation order: Yes  Observation education completed and documented: Yes      Marbella Marshall RN    "

## 2020-04-28 NOTE — PLAN OF CARE
Patient has no deficits other than complaining of dizziness intermittently. Vitals are stable. Echocardiogram has been completed.

## 2020-04-29 LAB
B2 GLYCOPROT1 IGG SERPL IA-ACNC: 1.1 U/ML
B2 GLYCOPROT1 IGM SERPL IA-ACNC: <2.9 U/ML
LA PPP-IMP: NEGATIVE

## 2020-05-01 LAB
PROT C ACT/NOR PPP CHRO: 96 % (ref 70–170)
PROT S FREE AG ACT/NOR PPP IA: 68 % (ref 55–125)

## 2020-05-04 LAB — COPATH REPORT: NORMAL

## 2020-05-07 ENCOUNTER — VIRTUAL VISIT (OUTPATIENT)
Dept: NEUROLOGY | Facility: CLINIC | Age: 35
End: 2020-05-07
Payer: COMMERCIAL

## 2020-05-07 DIAGNOSIS — G43.809 OTHER MIGRAINE WITHOUT STATUS MIGRAINOSUS, NOT INTRACTABLE: Primary | ICD-10-CM

## 2020-05-07 RX ORDER — SUMATRIPTAN 50 MG/1
50 TABLET, FILM COATED ORAL
Qty: 10 TABLET | Refills: 3 | Status: SHIPPED | OUTPATIENT
Start: 2020-05-07 | End: 2022-08-04

## 2020-05-07 NOTE — PROGRESS NOTES
"Reanna Gilbert is a 34 year old female who is being evaluated via a billable video visit.      The patient has been notified of following:     \"This video visit will be conducted via a call between you and your physician/provider. We have found that certain health care needs can be provided without the need for an in-person physical exam.  This service lets us provide the care you need with a video conversation.  If a prescription is necessary we can send it directly to your pharmacy.  If lab work is needed we can place an order for that and you can then stop by our lab to have the test done at a later time.    Video visits are billed at different rates depending on your insurance coverage.  Please reach out to your insurance provider with any questions.    If during the course of the call the physician/provider feels a video visit is not appropriate, you will not be charged for this service.\"    Patient has given verbal consent for Video visit? YES    How would you like to obtain your AVS?   Patient would like the video invitation sent by: [phone]    Will anyone else be joining your video visit?         Video-Visit Details    Type of service:  Video Visit    Video Start Time: 4 pm  Video End Time: 4 :45  Originating Location (pt. Location):   Distant Location (provider location):  [facility] NEUROLOGY     Platform used for Video Visit: home  BO Hernández          "

## 2020-05-08 NOTE — PROGRESS NOTES
Service Date: 05/07/2020      VIDEO TELEPHONE VISIT      HISTORY OF PRESENT ILLNESS:  This patient was referred to Neurology and we had a video conference lasting about 45 minutes relevant to a sensation of dizziness and some episodes of vertigo which occurred recently and prompted an evaluation.  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  About 2 weeks ago she had 6 episodes which were quite severe, mostly positionally related of vertiginous sensation with nausea, and she was seen in the [facility] and was hospitalized for 1 day under the care of Dr. Shirley Hull and was diagnosed as possibly benign positional vertigo, but there was a small lesion in the T2 in the right thalamus region.  A question of a small stroke was raised, and for that reason she was evaluated and this lesion was considered questionable lacunar subacute.  As she had no risk factors, she was excused from Stroke Neurology and Observation was recommended so she was briefly admitted for evaluation.  Her dizziness has subsided somewhat.  She still has some imbalance when she walks, but she has had no vertigo.  At the time of this vertigo feeling, she was just nauseated.  There was no numbness, tingling, diplopia or other symptoms.  She has never had this before.  The question of COVID-associated episode was raised, but I believe it was felt not to be a significant possibility.  She has had no COVID symptoms.  She was in the hospital worked up and thought perhaps the MRI findings were incidental, and she had a hypercoagulable workup, A1c, telemetry, echo with bubble, physical therapy, and she was on oral contraceptive which she has been on for several years and this was placed on hold.  She did have a Hallpike maneuver and it did have a positive Kristina-Hallpike maneuver with reproduction of symptoms.  She was referred for PT vestibular rehabilitation, and I believe she is on that.  She was  placed on aspirin.  Her lipid profile and hemoglobin A1c were normal.  Echo of the heart was normal.  Telemetry was normal.  She did have COVID testing which was negative.  Since that time of the episodes, she has had some headaches.  When going backwards she has had headaches also, in speaking with her and her mother in the background, she did have migraine in her young years and these were a little different than the headache she has had lately but says that she has a dull headache now, but then she did have some episodes of serious problem with photophobia and phonophobia where she had to be bedridden.  She has had a little difficulty with her balance since these episodes.  She has had no hearing loss, no significant tinnitus.  She is back to work, except for the slightly dizzy feeling.  She has no family history.  There is no history of neurological problems, Meniere's or others.      She has had no medical problems, no risk factors for cerebrovascular disease.  Her studies include CT scan of the head without contrast, CT angiogram of head and neck which was normal, no evidence of dissection, and this single focus of T2 hyperintensity on FLAIR in the lateral part of the right thalamus, the differential of this was possible vascular and did not appear to be an acute lesion, and no enhanced or susceptibility artifact or restricted diffusion was seen.  The conclusion could be sequelae of an old nonspecific insult.  The patient has done relatively well.  Her lab studies for coagulopathy will be reviewed.      We will discuss the imaging with Neuroradiology when the opportunity comes up.  In the meantime, we told her to stay on the aspirin and she may return to work and call if these symptoms become aggravated.  She should stay off the birth control pill.      PHYSICAL EXAMINATION:  On examination on video, she is a very pleasant woman.  There are normal eye movements.  Good coordination in her arms.  Finger-to-nose  testing, rapid alternating movements were normal.  All cranial nerves appear normal on video.  She has a little bit of unsteadiness on the Romberg examination which she noted.      She has no leg weakness and can do motor testing normal.      ASSESSMENT AND PLAN:  In summary, in this video visit we have assessed her.  I believe this lesion in the thalamus is probably incidental.  I thought I saw it on the DWI, but I will have to recheck.  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  We will see her after the epidemic is over.  She is a most pleasant patient.         SUHAIL ROSAS MD             D: 2020   T: 2020   MT: MARIKA      Name:     ELLEN MEEKS   MRN:      [id number]        Account:      [id number]   :      1985           Service Date: 2020      Document: [id number]

## 2020-05-14 ENCOUNTER — RECORDS - HEALTHEAST (OUTPATIENT)
Dept: RADIOLOGY | Facility: CLINIC | Age: 35
End: 2020-05-14

## 2020-07-02 ENCOUNTER — TELEPHONE (OUTPATIENT)
Dept: NEUROLOGY | Facility: CLINIC | Age: 35
End: 2020-07-02

## 2020-07-02 DIAGNOSIS — Z86.73 HISTORY OF TIA (TRANSIENT ISCHEMIC ATTACK) AND STROKE: Primary | ICD-10-CM

## 2020-07-06 ENCOUNTER — TELEPHONE (OUTPATIENT)
Dept: ALLERGY | Facility: CLINIC | Age: 35
End: 2020-07-06

## 2020-07-13 NOTE — TELEPHONE ENCOUNTER
Spoke with pt and she wants to hold off with immunotherapy at this time. Serum placed in the September  bin.  Tato Jane / BOLA

## 2020-07-20 ENCOUNTER — HOSPITAL ENCOUNTER (OUTPATIENT)
Dept: MRI IMAGING | Facility: CLINIC | Age: 35
Discharge: HOME | End: 2020-07-20
Attending: PSYCHIATRY & NEUROLOGY | Admitting: PSYCHIATRY & NEUROLOGY
Payer: COMMERCIAL

## 2020-07-20 DIAGNOSIS — Z86.73 HISTORY OF TIA (TRANSIENT ISCHEMIC ATTACK) AND STROKE: ICD-10-CM

## 2020-07-20 PROCEDURE — 25500064 ZZH RX 255 OP 636: Performed by: PSYCHIATRY & NEUROLOGY

## 2020-07-20 PROCEDURE — A9585 GADOBUTROL INJECTION: HCPCS | Performed by: PSYCHIATRY & NEUROLOGY

## 2020-07-20 PROCEDURE — 70553 MRI BRAIN STEM W/O & W/DYE: CPT

## 2020-07-20 RX ORDER — GADOBUTROL 604.72 MG/ML
7 INJECTION INTRAVENOUS ONCE
Status: COMPLETED | OUTPATIENT
Start: 2020-07-20 | End: 2020-07-20

## 2020-07-20 RX ADMIN — GADOBUTROL 7 ML: 604.72 INJECTION INTRAVENOUS at 10:23

## 2020-09-23 ENCOUNTER — TELEPHONE (OUTPATIENT)
Dept: ALLERGY | Facility: CLINIC | Age: 35
End: 2020-09-23

## 2020-09-23 NOTE — TELEPHONE ENCOUNTER
Patient's Green 1:1,000 serums  on 20. Last injection given 2020. Serums discarded.    Tato Jane LPN

## 2020-11-22 ENCOUNTER — HEALTH MAINTENANCE LETTER (OUTPATIENT)
Age: 35
End: 2020-11-22

## 2021-03-30 ENCOUNTER — TELEPHONE (OUTPATIENT)
Dept: ALLERGY | Facility: CLINIC | Age: 36
End: 2021-03-30

## 2021-03-30 NOTE — TELEPHONE ENCOUNTER
Patient's Blue 1:100, Yellow 1:10 and Red 1:1 serums  on 2021. Last injection given 2020. Serums discarded.    Tato Jane LPN

## 2021-05-30 VITALS — BODY MASS INDEX: 24.38 KG/M2 | WEIGHT: 138.7 LBS

## 2021-06-09 NOTE — PROGRESS NOTES
Chief Complaint   Patient presents with     Headache     sx for a few days     Facial Pain     Abdominal Pain     Back Pain     History of Present Illness: Nursing notes reviewed. Patient has had bad cold symptoms with a cough for about a week, with frontal sinus pain and headache, worse with bending forwards. She abdominal and back discomfort are similar to when she had an ovarian cyst in the past. Her abdominal discomfort is just above the umbilicus, and back discomfort is in the mid lumbar area. Her abdominal and back discomfort have been on/off since yesterday, not present at time of exam. She still has her appendix. No urinary or BM problems.    Review of systems: See history of present illness, otherwise negative.     Current Outpatient Prescriptions   Medication Sig Dispense Refill     adapalene-benzoyl peroxide (EPIDUO) 0.1-2.5 % gel Apply topically bedtime.       CLINDAMYCIN PHOSPHATE (CLINDACIN P TOP) Apply topically.       norgestrel-ethinyl estradiol (LOW-OGESTREL, 28,) 0.3-30 mg-mcg per tablet Take 1 tablet by mouth.       amoxicillin (AMOXIL) 875 MG tablet Take 1 tablet (875 mg total) by mouth 2 (two) times a day for 10 days. 20 tablet 0     cetirizine (ZYRTEC) 10 MG tablet Take 10 mg by mouth.       No current facility-administered medications for this visit.        No past medical history on file.   No past surgical history on file.   Social History     Social History     Marital status: Single     Spouse name: N/A     Number of children: N/A     Years of education: N/A     Social History Main Topics     Smoking status: Never Smoker     Smokeless tobacco: Never Used     Alcohol use Not on file     Drug use: Not on file     Sexual activity: Not on file     Other Topics Concern     Not on file     Social History Narrative       History   Smoking Status     Never Smoker   Smokeless Tobacco     Never Used      Exam:   Blood pressure 132/74, pulse 76, temperature 97.7  F (36.5  C), temperature source  Oral, weight 138 lb 11.2 oz (62.9 kg), SpO2 100 %, not currently breastfeeding.    EXAM:   General: Vital signs reviewed. Patient is in no acute appearing distress getting up and down form exam room chair briskly. Breathing is non labored appearing. Patient is alert and oriented x 3.   ENT: Ear exam shows clear TMs with no injection, left nasal turbinates are injected and edematous with tender frontal sinuses, no pharyngeal injection with increased post nasal drainage noted.  Neck: supple with no adenopathy.  Heart: Normal rate and rhythm without murmur  Lungs: Clear to auscultation with good air flow bilaterally.  Back: no tenderness  Abdomen soft, non tender, no abnormal masses. Normal bowel sounds.  Skin: warm and dry  Recent Results (from the past 24 hour(s))   Urinalysis   Result Value Ref Range    Color, UA Yellow Colorless, Yellow, Straw, Light Yellow    Clarity, UA Clear Clear    Glucose, UA Negative Negative    Bilirubin, UA Negative Negative    Ketones, UA Trace (!) Negative    Specific Gravity, UA 1.020 1.005 - 1.030    Blood, UA Trace (!) Negative    pH, UA 5.0 5.0 - 8.0    Protein, UA Negative Negative mg/dL    Urobilinogen, UA 0.2 E.U./dL 0.2 E.U./dL, 1.0 E.U./dL    Nitrite, UA Negative Negative    Leukocytes, UA Negative Negative    Bacteria, UA Few (!) None Seen hpf    RBC, UA 0-2 None Seen, 0-2 hpf    WBC, UA 0-5 None Seen, 0-5 hpf    Squam Epithel, UA 10-25 (!) None Seen, 0-5 lpf    Results from exam reviewed with patient.    Assessment/Plan   1. Low back pain  Urinalysis    Culture, Urine   2. Sinusitis  amoxicillin (AMOXIL) 875 MG tablet       Patient Instructions     Also see info below. I think your abdominal discomfort may be related to ovarian cysts. The small amount of blood in the urine may be related to a small amount of menstrual flow, but as a precaution, we will do a urine culture. I think the amoxicillin would treat most urine infections if it is positive.    Acute Bacterial  Rhinosinusitis (ABRS)  Acute bacterial rhinosinusitis (ABRS) is an infection of your nasal cavity and sinuses. It s caused by bacteria. Acute means that you ve had symptoms for less than 12 weeks.  Understanding your sinuses  The nasal cavity is the large air-filled space behind your nose. The sinuses are a group of spaces formed by the bones of your face. They connect with your nasal cavity. ABRS causes the tissue lining these spaces to become inflamed. Mucus may not drain normally. This leads to facial pain and other symptoms.  What causes ABRS?  ABRS most often follows an upper respiratory infection caused by a virus. Bacteria then infect the lining of your nasal cavity and sinuses. But you can also get ABRS if you have:    Nasal allergies    Long-term nasal swelling and congestion not caused by allergies    Blockage in the nose  Symptoms of ABRS  The symptoms of ABRS may be different for each person, and can include:    Nasal congestion    Runny nose    Fluid draining from the nose down the throat (postnasal drip)    Headache    Cough    Pain in the sinuses    Thick, colored fluid from the nose (mucus)    Fever  Diagnosing ABRS  ABRS may be diagnosed if you ve had an upper respiratory infection like a cold and cough for longer than 10 to 14 days. Your health care provider will ask about your symptoms and your medical history. The provider will check your vital signs, including your temperature. You ll have a physical exam. The health care provider will check your ears, nose, and throat. You likely won t need any tests. If ABRS comes back, you may have a culture or other tests.  Treatment for ABRS  Treatment may include:    Antibiotic medicine. This is for symptoms that last for at least 10 to 14 days.    Nasal corticosteroid medicine. Drops or spray used in the nose can lessen swelling and congestion.    Over-the-counter pain medicine. This is to lessen sinus pain and pressure.    Nasal decongestant medicine.  Spray or drops may help to lessen congestion. Do not use them for more than a few days.    Salt wash (saline irrigation). This can help to loosen mucus.  Possible complications of ABRS  ABRS may come back or become long-term (chronic).  In rare cases, ABRS may cause complications such as:     Inflamed tissue around the brain and spinal cord (meningitis)    Inflamed tissue around the eyes (orbital cellulitis)    Inflamed bones around the sinuses (osteitis)  These problems may need to be treated in a hospital with intravenous (IV) antibiotic medicine or surgery.  When to call the health care provider  Call your health care provider if you have any of the following:    Symptoms that don t get better, or get worse    Symptoms that don t get better after 3 to 5 days on antibiotics    Trouble seeing    Swelling around your eyes    Confusion or trouble staying awake        5084-8024 [employer]. [address] [place] [id number]. All rights reserved. This information is not intended as a substitute for professional medical care. Always follow your healthcare professional's instructions.           Alvarez Cramer,

## 2021-09-19 ENCOUNTER — HEALTH MAINTENANCE LETTER (OUTPATIENT)
Age: 36
End: 2021-09-19

## 2022-01-08 ENCOUNTER — HEALTH MAINTENANCE LETTER (OUTPATIENT)
Age: 37
End: 2022-01-08

## 2022-01-17 ENCOUNTER — TRANSFERRED RECORDS (OUTPATIENT)
Dept: HEALTH INFORMATION MANAGEMENT | Facility: CLINIC | Age: 37
End: 2022-01-17
Payer: COMMERCIAL

## 2022-02-01 ENCOUNTER — MEDICAL CORRESPONDENCE (OUTPATIENT)
Dept: HEALTH INFORMATION MANAGEMENT | Facility: CLINIC | Age: 37
End: 2022-02-01
Payer: COMMERCIAL

## 2022-02-02 ENCOUNTER — MEDICAL CORRESPONDENCE (OUTPATIENT)
Dept: HEALTH INFORMATION MANAGEMENT | Facility: CLINIC | Age: 37
End: 2022-02-02
Payer: COMMERCIAL

## 2022-02-02 ENCOUNTER — TRANSCRIBE ORDERS (OUTPATIENT)
Dept: MATERNAL FETAL MEDICINE | Facility: CLINIC | Age: 37
End: 2022-02-02
Payer: COMMERCIAL

## 2022-02-02 ENCOUNTER — TRANSCRIBE ORDERS (OUTPATIENT)
Dept: MATERNAL FETAL MEDICINE | Facility: HOSPITAL | Age: 37
End: 2022-02-02
Payer: COMMERCIAL

## 2022-02-02 DIAGNOSIS — O26.90 PREGNANCY RELATED CONDITION, ANTEPARTUM: Primary | ICD-10-CM

## 2022-02-03 ENCOUNTER — PRE VISIT (OUTPATIENT)
Dept: MATERNAL FETAL MEDICINE | Facility: HOSPITAL | Age: 37
End: 2022-02-03
Payer: COMMERCIAL

## 2022-02-04 ENCOUNTER — OFFICE VISIT (OUTPATIENT)
Dept: MATERNAL FETAL MEDICINE | Facility: HOSPITAL | Age: 37
End: 2022-02-04
Attending: OBSTETRICS & GYNECOLOGY
Payer: COMMERCIAL

## 2022-02-04 ENCOUNTER — ANCILLARY PROCEDURE (OUTPATIENT)
Dept: ULTRASOUND IMAGING | Facility: HOSPITAL | Age: 37
End: 2022-02-04
Attending: OBSTETRICS & GYNECOLOGY
Payer: COMMERCIAL

## 2022-02-04 DIAGNOSIS — O26.90 PREGNANCY RELATED CONDITION, ANTEPARTUM: ICD-10-CM

## 2022-02-04 DIAGNOSIS — O28.0 ABNORMAL MATERNAL SERUM SCREENING TEST: Primary | ICD-10-CM

## 2022-02-04 DIAGNOSIS — Z36.9 FIRST TRIMESTER SCREENING: ICD-10-CM

## 2022-02-04 DIAGNOSIS — O28.5 ABNORMAL GENETIC TEST IN PREGNANCY: Primary | ICD-10-CM

## 2022-02-04 PROCEDURE — 96040 HC GENETIC COUNSELING, EACH 30 MINUTES: CPT | Performed by: GENETIC COUNSELOR, MS

## 2022-02-04 PROCEDURE — 99207 PR NO CHARGE LOS: CPT | Performed by: OBSTETRICS & GYNECOLOGY

## 2022-02-04 PROCEDURE — 76801 OB US < 14 WKS SINGLE FETUS: CPT

## 2022-02-04 PROCEDURE — 76801 OB US < 14 WKS SINGLE FETUS: CPT | Mod: 26 | Performed by: OBSTETRICS & GYNECOLOGY

## 2022-02-04 NOTE — PROGRESS NOTES
Please refer to ultrasound report under 'Imaging' Studies of 'Chart Review' tabs.    Tee Deluca M.D.

## 2022-02-04 NOTE — PROGRESS NOTES
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        Impression/Plan:   1.  Reanna had an ultrasound today, which was normal, and plans to return to [facility] at approximately 16 weeks gestation for an early anatomy scan. Reanna understands amniocentesis for diagnostic testing would be available that day if desired, and was encouraged to contact genetic counseling ahead of time to discuss further if she would like to proceed with amniocentesis.     2.  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  Lab reported post-test risks range from 20-53%.  Genetic counseling discussed a calculated positive predictive value of 2-4%.  This testing does indicate a increased probability for this genetic condition and diagnostic testing, either prenatally via amniocentesis or postnatally on peripheral blood is recommended.  If amniocentesis is declined, comprehensive level II anatomy scan and fetal echocardiogram are recommended.      Pregnancy History:   /Parity:    Age at Delivery: 37 year old  JOE: 2022, by Last Menstrual Period  Gestational Age: 13w4d    No significant complications or exposures were reported in the current pregnancy.  Medical History:   Reanna s reported medical history is not expected to impact pregnancy management or risks to fetal development.       Family History:     Family history was not assessed today due to the scope of our conversation       Carrier Screening:       Expanded carrier screening for mutations in a large panel of genes associated with autosomal recessive conditions including cystic fibrosis, spinal muscular atrophy, and others, is now  available.      Carrier screening was not discussed today.       Risk Assessment for Chromosome Conditions:   We explained that the risk for fetal chromosome abnormalities increases with maternal age. We discussed specific features of common chromosome abnormalities, including Down syndrome, trisomy 13, trisomy 18, and sex chromosome trisomies.      - At age 36 at midtrimester, the risk to have a baby with Down syndrome is 1 in 342.     - At age 36 at midtrimester, the risk to have a baby with any chromosome abnormality is 1 in  172.     Reanna's NIPT results are negative for common whole chromosome aneuploidy, and her pregnancy is considered at significantly reduced risk for Down syndrome, trisomy 18, trisomy 13, or sex chromosome abnormality.  Post test risk for these conditions is typically considered <1/10,000.         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  We discussed in detail that NIPT is a screening test designed to identify pregnancies that are at higher risk for for specific genetic conditions, and that NIPT is NOT diagnostic, meaning it does not tell us if a pregnancy is actually affected or not. We discussed that based on this result, her pregnancy is considered at higher probability for having this condition, but that we do not know for sure if the pregnancy is affected or not.      In general, NIPT is an accurate assessment of risks for pregnancies to be affected with select genetic conditions.  We reviewed her negative results for common whole chromosome aneuploidies, Down syndrome, trisomy 18, trisomy 13, and discussed that these negative results have a confidence of >99.99%. However, using NIPT to assess for microdeletions is a much newer option with NIPT, and the accuracy and validity of NIPT for microdeletions is not well established.  The American College of Obstetrics and  Gynecology recommends not using NIPT to screen for these conditions for this reason.      A significant portion of today's appointment was spent discussing what is known about the accuracy of NIPT for 22q11.2 deletion and the probability that the pregnancy is actually affected. Reanna's test report from [facility] has two separate post-test risks identified on it: a 1/5 risk and a 53% PPV.  This discrepancy was discussed directly with genetic counselors at [facility] who reports that either number may be appropriate, and that the 1/5 risk is a calculation using an a priori risk for 22q and the 53% PPV is based on an interval validation study.  Specific details and data from these studies were not reviewable by genetic counseling before today's appointment. Positive Predictive Value (PPV) is a statistical calculation of the likelihood that an abnormal screening result is a true positive, and using the sensitivity and specificity data provided by [facility] for their test and a disease incidence of 1/4000, a calculated PPV for Reanna Gilbert's result is 2%.  We discussed the significant challenges in using this type of result when the post test risks might range from 2-53%, and we discussed that diagnostic testing to clarify the status of the pregnancy is recommended.     After discussing amniocentesis as an option for diagnostic testing during pregnancy, Reanna was not certain if she would proceed with this due to the slight risks for miscarriage associated with testing.  Reanna understands that this testing would be done at [facility] and could be coordinated with any subsequent ultrasounds in our clinic.  Diagnostic results  (SNP microarray) would be available in 7-12 days from the procedure.     Clinical Features of 22q11.2 deletion syndrome:    Individuals with 22q11.2 deletion syndrome, previously called DiGeorge syndrome and velocardiofacial syndrome, can present with a wide range of features that are highly variable, even  within families. The major clinical manifestations of 22q11.2DS include congenital heart disease, particularly conotruncal malformations (ventricular septal defect, tetralogy of Fallot, interrupted aortic arch, and truncus arteriosus), palatal abnormalities (velopharyngeal incompetence, submucosal cleft palate, bifid uvula, and cleft palate), immune deficiency, characteristic facial features, and learning difficulties. Hearing loss can be sensorineural and/or conductive. Laryngotracheoesophageal, gastrointestinal, ophthalmologic, central nervous system, skeletal, and genitourinary anomalies also occur. Psychiatric illness (e.g. autism, anxiety, schizophrenia) and autoimmune disorders are more common in individuals with 22q11.2DS. In general, young children with 22q11.2DS have delays in motor milestones with a mean age at walking of 18 months, and delay in emergence of language (many are nonverbal at age 2-3 years). Intellect varies between children and over time. Verbal skills tend to be relatively stronger than visual-spatial skills.    Because of the high variability of this condition it is unlikely that one affected individual would experience all, or even most, of the symptoms listed above.  However, we discussed that the most common physical symptoms are cardiac anomalies and palate anomalies, both occurring in ~2/3 of affected individuals.  Developmental delays are expected in most individuals, and can be highly variable in presentation.      Reanna asked about the benefits of diagnostic testing in pregnancy, and we discussed that some families would choose to do the amniocentesis to remove the uncertainty of screening testing, either to be better prepared for delivery or to rule out the condition.  We also briefly discussed that some families might choose to terminate a pregnancy based on a genetic diagnosis, and that in those situations an amniocentesis is recommended.  Termination decisions should  generally not be made on the basis of NIPT results alone. If Reanna opts to decline diagnostic testing, we discussed that testing immediately at delivery would be recommended, as some newborns with this condition can experience serious hypocalcemia in infancy.      All of Reanna's questions were answered to her satisfaction today and she was encouraged to remain in contact as needed moving forward.  If Reanna decides she would like an amniocentesis, she was encouraged to call genetic counseling to discuss prior to any future appointments with [facility].     Testing Options:     We discussed the following options:   Non-invasive Prenatal Testing (NIPT)    Maternal plasma cell-free DNA testing; first trimester ultrasound with nuchal translucency and nasal bone assessment is recommended, when appropriate    Screens for fetal trisomy 21, trisomy 13, trisomy 18, and sex chromosome aneuploidy    Cannot screen for open neural tube defects; maternal serum AFP after 15 weeks is recommended       Genetic Amniocentesis    Invasive procedure typically performed in the second trimester by which amniotic fluid is obtained for the purpose of chromosome analysis and/or other prenatal genetic analysis    Diagnostic results; >99% sensitivity for fetal chromosome abnormalities    AFAFP measurement tests for open neural tube defects       Comprehensive (Level II) ultrasound: Detailed ultrasound performed between 18-22 weeks gestation to screen for major birth defects and markers for aneuploidy.        We reviewed the benefits and limitations of this testing.  Screening tests provide a risk assessment specific to the pregnancy for certain fetal chromosome abnormalities, but cannot definitively diagnose or exclude a fetal chromosome abnormality.  Follow-up genetic counseling and consideration of diagnostic testing is recommended with any abnormal screening result.     Diagnostic tests carry inherent risks- including risk of miscarriage-  that require careful consideration.  These tests can detect fetal chromosome abnormalities with greater than 99% certainty.  Results can be compromised by maternal cell contamination or mosaicism, and are limited by the resolution of cytogenetic G-banding technology.  There is no screening nor diagnostic test that can detect all forms of birth defects or mental disability.     It was a pleasure to be involved with Reanna s care. Face-to-face time of the meeting was 45 minutes.      Ebenezer Benton MS, [facility]  Licensed Genetic Counselor  Phone: [phone]  Pager: [phone]

## 2022-02-09 DIAGNOSIS — O28.5 ABNORMAL CHROMOSOMAL AND GENETIC FINDING ON ANTENATAL SCREENING OF MOTHER: Primary | ICD-10-CM

## 2022-02-09 NOTE — PROGRESS NOTES
2/9/2022    Patient called scheduling and reports that she has decided to proceed with amniocentesis at her scheduled appointments for 2/3 complete ultrasound.  Orders placed for GC appointment prior to ultrasound.      Ebenezer Benton MS, [facility]  Licensed Genetic Counselor  Phone: [phone]  Pager: [phone]

## 2022-02-23 ENCOUNTER — ANCILLARY PROCEDURE (OUTPATIENT)
Dept: ULTRASOUND IMAGING | Facility: HOSPITAL | Age: 37
End: 2022-02-23
Attending: OBSTETRICS & GYNECOLOGY
Payer: COMMERCIAL

## 2022-02-23 ENCOUNTER — OFFICE VISIT (OUTPATIENT)
Dept: MATERNAL FETAL MEDICINE | Facility: HOSPITAL | Age: 37
End: 2022-02-23
Attending: OBSTETRICS & GYNECOLOGY
Payer: COMMERCIAL

## 2022-02-23 DIAGNOSIS — O28.5 ABNORMAL CHROMOSOMAL AND GENETIC FINDING ON ANTENATAL SCREENING OF MOTHER: Primary | ICD-10-CM

## 2022-02-23 DIAGNOSIS — O28.0 ABNORMAL MATERNAL SERUM SCREENING TEST: ICD-10-CM

## 2022-02-23 DIAGNOSIS — O09.522 MULTIGRAVIDA OF ADVANCED MATERNAL AGE IN SECOND TRIMESTER: ICD-10-CM

## 2022-02-23 DIAGNOSIS — O28.5 ABNORMAL CHROMOSOMAL AND GENETIC FINDING ON ANTENATAL SCREENING OF MOTHER: ICD-10-CM

## 2022-02-23 PROCEDURE — 99214 OFFICE O/P EST MOD 30 MIN: CPT | Mod: 25 | Performed by: OBSTETRICS & GYNECOLOGY

## 2022-02-23 PROCEDURE — 96040 HC GENETIC COUNSELING, EACH 30 MINUTES: CPT | Performed by: GENETIC COUNSELOR, MS

## 2022-02-23 PROCEDURE — 76805 OB US >/= 14 WKS SNGL FETUS: CPT | Mod: 26 | Performed by: OBSTETRICS & GYNECOLOGY

## 2022-02-23 PROCEDURE — 76805 OB US >/= 14 WKS SNGL FETUS: CPT

## 2022-02-23 NOTE — PROGRESS NOTES
[facility]  Genetic Counseling Consult    Patient: Reanna Gilbert YOB: 1985   Date of Service:  22      Reanna Gilbert was seen at the [facility] for genetic consultation given abnormal NIPT (Panorama) results.      Impression/Plan:   Reanna met with my colleague on 22 to discuss her positive NIPT results for 22q deletion and the post test PPV value ranging from 2-53%. The patient has elected to proceed with a genetic amniocentesis. I met with Reanna and her partner, Rigo, to discuss the benefits, risks, and limitations of genetic amniocentesis today prior to her 2/3 complete ultrasound. Reanna verbalized understanding and consent to a genetic amniocentesis today in an effort to get definitive information about 22q deletion in her current pregnancy. She has elected to pursue only microarray testing on amniotic fluid. She was offered FISH and chromosome analysis as well, but declined.     On her 2/3 complete today, the chorion-amnion membrane was not fused and the genetic amniocentesis was not attempted as was originally planned. Please see ultrasound report for additional information. Due to concern for possible congenital heart defect on ultrasound today, the patient has opted to pursue an early fetal echocardiogram before deciding on a second attempt at an amniocentesis.    Pregnancy History:   /Parity:                            Age at Delivery: 37 year old  JOE: 2022, by Last Menstrual Period                  Gestational Age: 16w2d  -  No significant complications or exposures were reported in the current pregnancy.       Family History:   A three-generation pedigree was obtained, and is scanned under the  Media  tab. The reported family history is negative for multiple miscarriages, stillbirths, birth defects, mental retardation, known genetic conditions, and consanguinity.       Carrier  Screening:   The patient reports that she and the father of the pregnancy have  ancestry:      Cystic fibrosis is an autosomal recessive genetic condition that occurs with increased frequency in individuals of  ancestry and carrier screening for this condition is available.  In addition,  screening in the [facility] includes cystic fibrosis.    Expanded carrier screening for mutations in a large panel of genes associated with autosomal recessive conditions including cystic fibrosis, spinal muscular atrophy, and others, is now available.    The patient has declined the carrier screening options reviewed today.       Risk Assessment:   We explained that the risk for fetal chromosome abnormalities increases with maternal age. We discussed specific features of common chromosome abnormalities, including Down syndrome, trisomy 13, trisomy 18, and sex chromosome trisomies.       - At age 36 at midtrimester, the risk to have a baby with Down syndrome is 1 in 342.     - At age 36 at midtrimester, the risk to have a baby with any chromosome abnormality is 1 in  172.      Reanna's NIPT results are negative for common whole chromosome aneuploidy, and her pregnancy is considered at significantly reduced risk for Down syndrome, trisomy 18, trisomy 13, or sex chromosome abnormality.  Post test risk for these conditions is typically considered <1/10,000.      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 It was previously reviewed with  Reanna that her test report from [facility] has two separate post-test risks identified on it: a 1/5 risk and a 53% PPV.  This discrepancy was discussed directly with genetic counselors at [facility] who reports that either number may be appropriate, and that the 1/5 risk is a calculation using an a priori risk for 22q and the 53% PPV is based on an internal validation study.  Positive Predictive Value (PPV) is a statistical calculation of the likelihood  that an abnormal screening result is a true positive, and using the sensitivity and specificity data provided by [facility] for their test and a disease incidence of 1/4000, a calculated PPV for Reanna Gilbert's result is 2%.  We discussed the significant challenges in using this type of result when the post test risks might range from 2-53%, and we discussed that diagnostic testing to clarify the status of the pregnancy is recommended.        Testing Options:   We discussed the following options:   Genetic Amniocentesis    Invasive procedure typically performed in the second trimester by which amniotic fluid is obtained for the purpose of microarray analysis and/or other prenatal genetic analysis    Diagnostic results; >99% sensitivity for fetal chromosome abnormalities    AFAFP measurement tests for open neural tube defects    We reviewed the benefits and limitations of this testing.  Screening tests provide a risk assessment specific to the pregnancy for certain fetal chromosome abnormalities, but cannot definitively diagnose or exclude a fetal chromosome abnormality.  Follow-up genetic counseling and consideration of diagnostic testing is recommended with any abnormal screening result. Diagnostic tests carry inherent risks- including risk of miscarriage- that require careful consideration.  These tests can detect fetal chromosome abnormalities with greater than 99% certainty.  Results can be compromised by maternal cell contamination or mosaicism, and are limited by the resolution of cytogenetic G-banding technology.  There is no screening nor diagnostic test that can detect all forms of birth defects or mental disability.    It was a pleasure to be involved with Reanna garcia care. Face-to-face time of the meeting was 25 minutes.  Radha Mays MS, [facility]  Maternal Fetal Medicine  [facility]  Phone:[phone]

## 2022-02-24 ENCOUNTER — DOCUMENTATION ONLY (OUTPATIENT)
Dept: MATERNAL FETAL MEDICINE | Facility: HOSPITAL | Age: 37
End: 2022-02-24
Payer: COMMERCIAL

## 2022-02-25 ENCOUNTER — TELEPHONE (OUTPATIENT)
Dept: MATERNAL FETAL MEDICINE | Facility: CLINIC | Age: 37
End: 2022-02-25
Payer: COMMERCIAL

## 2022-02-25 NOTE — TELEPHONE ENCOUNTER
Spoke with Reanna today, offered appointment times next week for fetal echo, pt accepts 3/1 @3 pm.  Location and parking instructions reviewed, pt JEOVANY. No further questions  Deborah Dailey RN

## 2022-03-01 ENCOUNTER — HOSPITAL ENCOUNTER (OUTPATIENT)
Dept: CARDIOLOGY | Facility: CLINIC | Age: 37
Discharge: HOME | End: 2022-03-01
Attending: OBSTETRICS & GYNECOLOGY | Admitting: OBSTETRICS & GYNECOLOGY
Payer: COMMERCIAL

## 2022-03-01 DIAGNOSIS — O09.522 MULTIGRAVIDA OF ADVANCED MATERNAL AGE IN SECOND TRIMESTER: ICD-10-CM

## 2022-03-01 DIAGNOSIS — O28.5 ABNORMAL CHROMOSOMAL AND GENETIC FINDING ON ANTENATAL SCREENING OF MOTHER: ICD-10-CM

## 2022-03-01 PROCEDURE — 76827 ECHO EXAM OF FETAL HEART: CPT | Mod: 26 | Performed by: PEDIATRICS

## 2022-03-01 PROCEDURE — 76825 ECHO EXAM OF FETAL HEART: CPT | Mod: 26 | Performed by: PEDIATRICS

## 2022-03-01 PROCEDURE — 93325 DOPPLER ECHO COLOR FLOW MAPG: CPT

## 2022-03-01 PROCEDURE — 93325 DOPPLER ECHO COLOR FLOW MAPG: CPT | Mod: 26 | Performed by: PEDIATRICS

## 2022-03-02 ENCOUNTER — OFFICE VISIT (OUTPATIENT)
Dept: CARDIOLOGY | Facility: CLINIC | Age: 37
End: 2022-03-02
Payer: COMMERCIAL

## 2022-03-02 ENCOUNTER — TELEPHONE (OUTPATIENT)
Dept: MATERNAL FETAL MEDICINE | Facility: CLINIC | Age: 37
End: 2022-03-02

## 2022-03-02 DIAGNOSIS — O28.5 ABNORMAL CHROMOSOMAL AND GENETIC FINDING ON ANTENATAL SCREENING OF MOTHER: Primary | ICD-10-CM

## 2022-03-02 PROCEDURE — 99205 OFFICE O/P NEW HI 60 MIN: CPT | Mod: 25 | Performed by: PEDIATRICS

## 2022-03-02 NOTE — PROGRESS NOTES
Fetal Cardiology Consultation    Patient:  Reanna Gilbert MRN:  [id number]   YOB: 1985 Age:  36 year old   Date of Visit:  3/2/2022 PCP:  No Ref-Primary, Physician   JOE: 2022, by Last Menstrual Period EGA: 17w2d weeks     Dear Dr. Ruiz:    I had the pleasure of seeing Reanna Gilbert at the [facility] in [place] on 3/2/2022 in consultation for fetal echocardiography results. She presented today accompanied by her partner. 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 There is retrograde flow across the ductus arteriosus, though the native branch pulmonary arteries are not well-seen. Leftard aortic arch. At least one large aortopulmonary collateral appears to arise from the descending thoracic aorta with a tortuous course (best seen in sagittal views); there may be an additional rightward collateral arising from the distal aortic arch. The alternative diagnosis could be truncus arteriosus, though there are no apparent branch pulmonary arteries from the ascending aorta, and the presence of a likely retrograde ductus arteriosus belies this. Normal right and left ventricular size and systolic function. No atrioventricular valve regurgitation, no evidence of diastolic dysfunction, no effusion.    I reviewed and interpreted the fetal echocardiogram today. I discussed the anatomy, physiology, expected fetal course, and need for post- confirmation. The fetal cardiac anatomy is expected to be dependent on the ductus arteriosus after birth. The expected post-sydnie intervention will depend on confirmation of these  findings, and is likely to include  surgery.     I discussed results of the study and visit with you.  -- A follow-up fetal echocardiogram is recommended in 6 weeks.  -- Delivery should occur at [facility].  -- Recommend consultation with the [facility], Neonatology service, Pediatric Cardiothoracic Surgery service and Genetics service at [facility].    Thank you for allowing me to participate in Ms. Gilbert's care. Please don't hesitate to contact me or the Fetal Cardiology team at [facility] with any questions or concerns.     I spent a total of 75 minutes on the date of the encounter doing chart review, patient history, documentation, counseling, and coordinating care.    Jimy Narayan MD  Pediatric Cardiology  [facility]  Phone [phone]    Review of the result(s) of each unique test - fetal echocardiogram

## 2022-03-02 NOTE — TELEPHONE ENCOUNTER
Called Reanna and left a message to check-in following her appointment with peds cardiology. Reanna had a NIPT that was positive for 22q deletion syndrome, and fetal echocardiogram was most consistent with a diagnosis of tetralogy of Fallot with pulmonary atresia. Reanna was offered an amniocentesis at her last MFM on 2/23 but the chorion-amnion member was not completely fused. I indicated in my VM that if Reanna was interested in coming in sooner than 3/14 (her next scheduled MFM appointment) we would be happy to accommodate her if they wanted to try another attempt at an amniocentesis.Call back number was provided.    Radha Mays MS, [facility]  Maternal Fetal Medicine  [facility]  Phone:[phone]

## 2022-03-14 ENCOUNTER — OFFICE VISIT (OUTPATIENT)
Dept: MATERNAL FETAL MEDICINE | Facility: HOSPITAL | Age: 37
End: 2022-03-14
Attending: OBSTETRICS & GYNECOLOGY
Payer: COMMERCIAL

## 2022-03-14 ENCOUNTER — ANCILLARY PROCEDURE (OUTPATIENT)
Dept: ULTRASOUND IMAGING | Facility: HOSPITAL | Age: 37
End: 2022-03-14
Attending: OBSTETRICS & GYNECOLOGY
Payer: COMMERCIAL

## 2022-03-14 DIAGNOSIS — O35.BXX0 FETAL TETRALOGY OF FALLOT AFFECTING ANTEPARTUM CARE OF MOTHER, NOT APPLICABLE OR UNSPECIFIED FETUS: ICD-10-CM

## 2022-03-14 DIAGNOSIS — O28.5 ABNORMAL CHROMOSOMAL AND GENETIC FINDING ON ANTENATAL SCREENING OF MOTHER: ICD-10-CM

## 2022-03-14 DIAGNOSIS — O28.5 ABNORMAL CHROMOSOMAL AND GENETIC FINDING ON ANTENATAL SCREENING OF MOTHER: Primary | ICD-10-CM

## 2022-03-14 DIAGNOSIS — O09.522 MULTIGRAVIDA OF ADVANCED MATERNAL AGE IN SECOND TRIMESTER: ICD-10-CM

## 2022-03-14 PROCEDURE — 59070 TRANSABDOM AMNIOINFUS W/US: CPT

## 2022-03-14 PROCEDURE — 36415 COLL VENOUS BLD VENIPUNCTURE: CPT | Performed by: OBSTETRICS & GYNECOLOGY

## 2022-03-14 PROCEDURE — 76811 OB US DETAILED SNGL FETUS: CPT

## 2022-03-14 PROCEDURE — 999N000069 HC STATISTIC GENETIC COUNSELING, < 16 MIN: Performed by: GENETIC COUNSELOR, MS

## 2022-03-14 PROCEDURE — 76811 OB US DETAILED SNGL FETUS: CPT | Mod: 26 | Performed by: OBSTETRICS & GYNECOLOGY

## 2022-03-14 PROCEDURE — 81265 STR MARKERS SPECIMEN ANAL: CPT | Mod: XU | Performed by: OBSTETRICS & GYNECOLOGY

## 2022-03-14 PROCEDURE — 81229 CYTOG ALYS CHRML ABNR SNPCGH: CPT | Performed by: OBSTETRICS & GYNECOLOGY

## 2022-03-14 PROCEDURE — 76946 ECHO GUIDE FOR AMNIOCENTESIS: CPT

## 2022-03-14 PROCEDURE — 59000 AMNIOCENTESIS DIAGNOSTIC: CPT | Performed by: OBSTETRICS & GYNECOLOGY

## 2022-03-14 PROCEDURE — 76946 ECHO GUIDE FOR AMNIOCENTESIS: CPT | Mod: 26 | Performed by: OBSTETRICS & GYNECOLOGY

## 2022-03-14 PROCEDURE — 88235 TISSUE CULTURE PLACENTA: CPT | Performed by: OBSTETRICS & GYNECOLOGY

## 2022-03-14 PROCEDURE — 99207 PR NO CHARGE LOS: CPT | Performed by: OBSTETRICS & GYNECOLOGY

## 2022-03-14 NOTE — PROGRESS NOTES
Reviewed benefits, risks, limitations of amniocentesis and result turn around time. Patient verbalized understanding and consented to procedure    Radha Mays MS, [facility]  Maternal Fetal Medicine  [facility]  Phone:[phone]

## 2022-03-14 NOTE — NURSING NOTE
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 Saw [facility], see their dictation.  After consent signed and TimeOut completed, Dr. Deluca withdrew adequate fluid x1 transabdominal pass.    Patient reports no pain.  Pt is RH positive.  MD reviewed blood type and screen and Rhogam NOT indicated. Discharge teaching completed and questions answered.  Pt discharged ambulatory and stable. Lab alerted by calling phone number on amnio work-aid for [facility].  Cytogenetics notified of specimen.  Specimen transported to main lab, warm hand-off completed.

## 2022-03-15 LAB — MCCMA SPECIMEN: NORMAL

## 2022-03-15 NOTE — PROGRESS NOTES
[facility]  Genetic Counseling Consult    Patient: Reanna Gilbert YOB: 1985   Date of Service:  3/14/22      Reanna Gilbert was seen at the [facility] for genetic consultation given abnormal NIPT for 22q del and fetal tetralogy of Fallot.       Impression/Plan:   Reanna had an ultrasound and amniocentesis. The following testing was ordered on the prenatal sample: microarray.  Results are expected within 7-10 days, and will be available in [facility]. We will contact her to discuss the results, and a copy will be forwarded to the office of the referring OB provider.      Pregnancy History:   /Parity:                            Age at Delivery: 37 year old  JOE: 2022, by Last Menstrual Period                  Gestational Age: 19w1d  -  No significant complications or exposures were reported in the current pregnancy.       Risk Assessment:   Reanna's NIPT results are negative for common whole chromosome aneuploidy, and her pregnancy is considered at significantly reduced risk for Down syndrome, trisomy 18, trisomy 13, or sex chromosome abnormality.  Post test risk for these conditions is typically considered <1/10,000.      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 It was previously reviewed with  Reanna that her test report from [facility] has two separate post-test risks identified on it: a 1/5 risk and a 53% PPV.  This discrepancy was discussed directly with genetic counselors at [facility] who reports that either number may be appropriate, and that the 1/5 risk is a calculation using an a priori risk for 22q and the 53% PPV is based on an internal validation study.  Positive Predictive Value (PPV) is a statistical calculation of the likelihood that an abnormal screening result is a true positive, and using the sensitivity and specificity data provided by [facility] for their test and a disease  incidence of 1/4000, a calculated PPV for Reanna Gilbert's result is 2%.  We discussed the significant challenges in using this type of result when the post test risks might range from 2-53%, and we discussed that diagnostic testing to clarify the status of the pregnancy is recommended.      Testing Options:   We discussed the following options:   Genetic Amniocentesis    Invasive procedure typically performed in the second trimester by which amniotic fluid is obtained for the purpose of chromosome analysis and/or other prenatal genetic analysis    Diagnostic results; >99% sensitivity for fetal chromosome abnormalities    AFAFP measurement tests for open neural tube defects    We reviewed the benefits and limitations of this testing.  Screening tests provide a risk assessment specific to the pregnancy for certain fetal chromosome abnormalities, but cannot definitively diagnose or exclude a fetal chromosome abnormality.  Follow-up genetic counseling and consideration of diagnostic testing is recommended with any abnormal screening result.     Diagnostic tests carry inherent risks- including risk of miscarriage- that require careful consideration.  These tests can detect fetal chromosome abnormalities with greater than 99% certainty.  Results can be compromised by maternal cell contamination or mosaicism, and are limited by the resolution of cytogenetic G-banding technology.  There is no screening nor diagnostic test that can detect all forms of birth defects or mental disability.    It was a pleasure to be involved with Reanna garcia care. Face-to-face time of the meeting was 15 minutes.  Radha Mays MS, [facility]  Maternal Fetal Medicine  [facility]  Phone:[phone]

## 2022-03-24 ENCOUNTER — TELEPHONE (OUTPATIENT)
Dept: MATERNAL FETAL MEDICINE | Facility: HOSPITAL | Age: 37
End: 2022-03-24
Payer: COMMERCIAL

## 2022-03-24 NOTE — TELEPHONE ENCOUNTER
LM for patient that I was calling to check-in. Microarray results are still pending. Let a call back number.    Radha Mays MS, [facility]  Maternal Fetal Medicine  [facility]  Phone:[phone]

## 2022-03-29 ENCOUNTER — TELEPHONE (OUTPATIENT)
Dept: MATERNAL FETAL MEDICINE | Facility: CLINIC | Age: 37
End: 2022-03-29
Payer: COMMERCIAL

## 2022-03-29 LAB
CHROM ANALY RESULT (ISCN): ABNORMAL
MATERNAL CELL CONTAM SPEC: ABNORMAL
PATHOLOGY STUDY: ABNORMAL
SERVICE CMNT-IMP: YES

## 2022-03-29 NOTE — TELEPHONE ENCOUNTER
Called patient to discuss microarray results and 22q deletion identified by microarray which is consistent with a fetal diagnosis of DiGeorge syndrome. Of note there is also a small deletion on chromosome 12p containing two genes that are associated with autosomal recessive condition Rotor syndrome. Patient is interested in sequencing the other two JOBN2U8 and QLAA3C3 genes on baby's other copy of chromosome 12. [facility] does offer sequencing of these two genes - I will work with [facility] to send out testing. We also discussed that two deletions on a microarray result could possibly be associated with a parental balanced translocation - she is interested in pursing parental studies but acknowledges that pregnancy management is the priority.    We discussed the option of continuing the pregnancy with expectant management versus not continuing the pregnancy. Reanna is planning to continue the pregnancy.     Patient indicated that she has not seen her primary OB, [facility] since February. I will reach out to [facility] to facilitate a care plan. Will also updated Dr. Narayan in cardiology with new tests. Will work with PCCs to facilitate a care plan and include Dr. Torrez who will be seeing the patient on 4/11 at [facility].    aRdha Mays MS, [facility]  Maternal Fetal Medicine  [facility]  Phone:[phone]

## 2022-03-31 ENCOUNTER — TELEPHONE (OUTPATIENT)
Dept: MATERNAL FETAL MEDICINE | Facility: CLINIC | Age: 37
End: 2022-03-31
Payer: COMMERCIAL

## 2022-03-31 ENCOUNTER — TELEPHONE (OUTPATIENT)
Dept: MATERNAL FETAL MEDICINE | Facility: HOSPITAL | Age: 37
End: 2022-03-31
Payer: COMMERCIAL

## 2022-03-31 DIAGNOSIS — O28.5 ABNORMAL GENETIC TEST IN PREGNANCY: ICD-10-CM

## 2022-03-31 DIAGNOSIS — O28.5 ABNORMAL CHROMOSOMAL AND GENETIC FINDING ON ANTENATAL SCREENING OF MOTHER: ICD-10-CM

## 2022-03-31 DIAGNOSIS — O35.BXX0 FETAL TETRALOGY OF FALLOT AFFECTING ANTEPARTUM CARE OF MOTHER, NOT APPLICABLE OR UNSPECIFIED FETUS: Primary | ICD-10-CM

## 2022-03-31 NOTE — TELEPHONE ENCOUNTER
Called Reanna to update her on the send out testing for Rotor syndrome and NPDM9I2 and RFPQ4E7 gene sequencing. Per [facility] cyto lab, cell cultures will be ready to ship on Monday, 4/4/2022. I reviewed with the patient that testing is being performed due the 12p deletion identified on the microarray. There are two genes in this region, QNKN9G3 and RHSQ7A9 that are associated with an autosomal recessive genetic condition called Rotor syndrome, associated with hyperbilirubinemia. We reviewed that this is a recessive condition and that both copies of KFOG6C4 or CRXE0N7 would need to have a pathogenic variant to cause diseases. The baby already has one copy of each of these genes deleted as identified by the microarray. The purpose of sequencing the genes on the other chromosome 12 is to determine if there is a point mutation in the other copy of ZKBS7I6 or RHTR8U7 gene that would cause Rotor syndrome. We also discussed possible genetic result outcomes including:    We have ordered a two gene panel of known Rotor syndrome genes. This testing will be done by [facility].     NGS can have three types of results:    Negative: meaning normal or no mutations are identified in the genes that were tested/sequenced    Positive: meaning a mutation that is known to be associated with a particular set of symptoms is identified    Variant of uncertain significance (VUS): meaning a change in the DNA sequence of a particular gene was seen but it is not known if it explains the symptoms. Parental testing is sometimes indicated to help determine the significance of a VUS.    I reviewed the possible results obtain from genetic testing with Reanna over the phone and she consented to testing. Telephone consent was witnessed by Angelica Lomax RN and the consent form was signed. Orders for genetic testing will be placed in the chart.    I reviewed with Reanna that this testing is separate from the 22q deletion result.  Regardless of gene sequencing for MYRE2Z2 and ZYDF2T6, the fetus does have a diagnosis of 22q deletion syndrome. The purpose of this gene sequencing is to know if baby has only 22q deletion syndrome OR if baby has both 22q deletion syndrome AND Rotor syndrome. The 12p deletion by itself is not associated with congenital anomalies or neurodevelopmental disorders. Reanna verbalized an understanding.    I did let Reanna know that I reached out to her primary OB and will fax her array results to [facility]. PCC in [facility] are working to schedule the follow-up echocardiogram.     Radha Mays MS, [facility]  Maternal Fetal Medicine  [facility]  Phone:[phone]

## 2022-03-31 NOTE — TELEPHONE ENCOUNTER
Called [facility] and spoke with triage RN, Blank, to discuss Reanna's abnormal microarray results. Results were faxed to  [phone]. Patient is scheduled for follow-up in [facility] on 4/11. Per clinic RN, the results will be reviewed with the provider and the OB Provider will decide if they want to schedule OB follow-up now or wait until after Reanna's  US with [facility] on 4/11.    Radha Mays MS, [facility]  Maternal Fetal Medicine  [facility]  Phone:[phone]

## 2022-04-01 ENCOUNTER — TELEPHONE (OUTPATIENT)
Dept: MATERNAL FETAL MEDICINE | Facility: CLINIC | Age: 37
End: 2022-04-01
Payer: COMMERCIAL

## 2022-04-01 DIAGNOSIS — O35.BXX0 FETAL TETRALOGY OF FALLOT AFFECTING ANTEPARTUM CARE OF MOTHER, NOT APPLICABLE OR UNSPECIFIED FETUS: Primary | ICD-10-CM

## 2022-04-01 NOTE — TELEPHONE ENCOUNTER
Pt returned call to RN at [facility] and agreed to fetal echo appt on 4/15 at 2pm at [facility].    Martha Parsons RN

## 2022-04-01 NOTE — TELEPHONE ENCOUNTER
JLUIS Forte to call RN coordinator at [facility] at [phone].    Fetal echo appt 4/15 2pm [facility].    Martha Parsons RN

## 2022-04-07 ENCOUNTER — TELEPHONE (OUTPATIENT)
Dept: MATERNAL FETAL MEDICINE | Facility: HOSPITAL | Age: 37
End: 2022-04-07
Payer: COMMERCIAL

## 2022-04-07 LAB — Lab: NORMAL

## 2022-04-07 NOTE — TELEPHONE ENCOUNTER
Called Reanna and JLUIS that cell culture was sent from our cytogenetics laboratory [facility]. [facility] confirmed receipt of sample this week and is requesting maternal blood for residential at their laboratory. We can draw this at Reanna's upcoming [facility] appointment on 4/11/22.    [facility] also communicated that they will begin prior authorization for WOAR6L7 and NJHG7D8 sequencing.    Radha Mays MS, [facility]  Maternal Fetal Medicine  [facility]  Phone:[phone]

## 2022-04-08 ENCOUNTER — TELEPHONE (OUTPATIENT)
Dept: MATERNAL FETAL MEDICINE | Facility: HOSPITAL | Age: 37
End: 2022-04-08
Payer: COMMERCIAL

## 2022-04-11 ENCOUNTER — OFFICE VISIT (OUTPATIENT)
Dept: MATERNAL FETAL MEDICINE | Facility: HOSPITAL | Age: 37
End: 2022-04-11
Attending: OBSTETRICS & GYNECOLOGY
Payer: COMMERCIAL

## 2022-04-11 ENCOUNTER — ANCILLARY PROCEDURE (OUTPATIENT)
Dept: ULTRASOUND IMAGING | Facility: HOSPITAL | Age: 37
End: 2022-04-11
Attending: OBSTETRICS & GYNECOLOGY
Payer: COMMERCIAL

## 2022-04-11 DIAGNOSIS — O28.5 ABNORMAL CHROMOSOMAL AND GENETIC FINDING ON ANTENATAL SCREENING OF MOTHER: ICD-10-CM

## 2022-04-11 DIAGNOSIS — O28.5 ABNORMAL CHROMOSOMAL AND GENETIC FINDING ON ANTENATAL SCREENING OF MOTHER: Primary | ICD-10-CM

## 2022-04-11 DIAGNOSIS — O28.5 ABNORMAL GENETIC TEST IN PREGNANCY: ICD-10-CM

## 2022-04-11 DIAGNOSIS — O35.BXX0 FETAL TETRALOGY OF FALLOT AFFECTING ANTEPARTUM CARE OF MOTHER, NOT APPLICABLE OR UNSPECIFIED FETUS: ICD-10-CM

## 2022-04-11 DIAGNOSIS — O35.BXX0 FETAL TETRALOGY OF FALLOT AFFECTING ANTEPARTUM CARE OF MOTHER, NOT APPLICABLE OR UNSPECIFIED FETUS: Primary | ICD-10-CM

## 2022-04-11 PROCEDURE — 76816 OB US FOLLOW-UP PER FETUS: CPT | Mod: 26 | Performed by: OBSTETRICS & GYNECOLOGY

## 2022-04-11 PROCEDURE — 99207 PR NO CHARGE LOS: CPT | Performed by: OBSTETRICS & GYNECOLOGY

## 2022-04-11 PROCEDURE — 76816 OB US FOLLOW-UP PER FETUS: CPT

## 2022-04-11 PROCEDURE — 999N000069 HC STATISTIC GENETIC COUNSELING, < 16 MIN: Performed by: GENETIC COUNSELOR, MS

## 2022-04-11 NOTE — PROGRESS NOTES
[facility] Fetal [facility]  Genetic Counseling Consult    Patient:  Reanna Gilbert YOB: 1985   Date of Service:  4/11/22      Reanna Gilbert was seen at the [facility] for genetic consultation to review microarray test results.       Impression/Plan:   I met with Reanna and her partner today to review array results in person. Microarray results are consistent with a fetal diagnosis of 22q deletion syndrome/DiGeorge syndrome. There was also a small deletion on chromosome 12p containing two genes that are associated with autosomal recessive condition Rotor syndrome.  Baby was also diagnosed by pediatric cardiology with tetralogy of Fallot with pulmonary atresia.     The small deletion on 12p which encompasses two genes, HECY0F7 and DWOJ4B8, which are associated with an autosomal recessive genetic condition called Rotor Syndrome. We reviewed that both copies of STEV4R3 or NVYL9Q3 would need to have a pathogenic variant to cause disease. The baby already has one copy of each of these genes deleted as identified by the microarray. The purpose of sequencing the genes on the other chromosome 12 is to determine if there is a pathogenic variant in the other copy of PXHY9S9 or JLRL2C2 gene that would cause Rotor syndrome. Rotor syndrome is considered to be a mild genetic condition associated with hyperbilirubinemia. However, there are a list of medications to avoid because the absence of the proteins that these genes encode may affect liver uptake and toxicity (although no adverse drug interactions have been documented in individuals with Rotor syn).     Since Reanna's daughter is known to have tetralogy of Fallot with pulmonary atresia, I did reach our to our pediatric cardiologist with the drug list to determine if baby would need any of these medications postnatally and if determining whether or not Reanna's daughter has Rotor syndrome  would be beneficial to the care team. At this point we do not know that Reanna's daughter has Rotor syndrome because we have not sequenced the VGPE9U3 and BYZG0R0 genes on baby s other chromosome 12 for pathogenic variants. We are waiting to get insurance prior auth before sequencing these two genes.    Regardless of gene sequencing for CTCW6C9 and EVND4N3, Reanna's daughter does have a diagnosis of 22q deletion syndrome. The purpose of this gene sequencing is to know if baby has only 22q deletion syndrome OR if baby has both 22q deletion syndrome AND Rotor syndrome. The 12p deletion by itself is not associated with congenital anomalies or neurodevelopmental disorders.    We also reviewed the importance of parental testing for 22q deletion syndrome. 90% of cases of 22q deletion syndrome are de micah (new). Approximately 10% are inherited from a mildly affected parent and the recurrence risk in a subsequent pregnancy is 50%. They are planning to do parental testing at a later time and have asked if testing could be done for Reanna on the [facility] sample remaining at [facility]. I will reach out to [facility] to clarify.     It was a pleasure to be involved with Reanna s care. Face-to-face time of the meeting was 15 minutes.    Radha Mays MS, [facility]  Maternal Fetal Medicine  [facility]  Phone:[phone]

## 2022-04-11 NOTE — PROGRESS NOTES
"Please see \"Imaging\" tab under Chart Review for full details.    Cathi Torrez MD  Maternal Fetal Medicine    "

## 2022-04-15 ENCOUNTER — HOSPITAL ENCOUNTER (OUTPATIENT)
Dept: CARDIOLOGY | Facility: CLINIC | Age: 37
Discharge: HOME | End: 2022-04-15
Attending: PEDIATRICS | Admitting: PEDIATRICS
Payer: COMMERCIAL

## 2022-04-15 DIAGNOSIS — O35.BXX0 FETAL TETRALOGY OF FALLOT AFFECTING ANTEPARTUM CARE OF MOTHER, NOT APPLICABLE OR UNSPECIFIED FETUS: ICD-10-CM

## 2022-04-15 PROCEDURE — 93325 DOPPLER ECHO COLOR FLOW MAPG: CPT | Mod: 26 | Performed by: PEDIATRICS

## 2022-04-15 PROCEDURE — 76825 ECHO EXAM OF FETAL HEART: CPT | Mod: 26 | Performed by: PEDIATRICS

## 2022-04-15 PROCEDURE — 76827 ECHO EXAM OF FETAL HEART: CPT | Mod: 26 | Performed by: PEDIATRICS

## 2022-04-15 PROCEDURE — 93325 DOPPLER ECHO COLOR FLOW MAPG: CPT

## 2022-04-21 ENCOUNTER — TELEPHONE (OUTPATIENT)
Dept: MATERNAL FETAL MEDICINE | Facility: CLINIC | Age: 37
End: 2022-04-21
Payer: COMMERCIAL

## 2022-04-21 NOTE — TELEPHONE ENCOUNTER
Returned Reanna's call this afternoon. She called and asked for an update on the PA at [facility]. I will connect with [facility] and get an update.    Radha Mays MS, [facility]  Maternal Fetal Medicine  [facility]  Phone:[phone]

## 2022-04-26 ENCOUNTER — TELEPHONE (OUTPATIENT)
Dept: MATERNAL FETAL MEDICINE | Facility: CLINIC | Age: 37
End: 2022-04-26
Payer: COMMERCIAL

## 2022-04-26 NOTE — TELEPHONE ENCOUNTER
"Followed up with Reanna regarding [facility] billing and Prior Authorization information for TVIR5C6 and HHXR4O4 sequencing. Per the benefit investigattion and PA. Reanna has a $800 deductible which has been met, , and [employer] will pay 50% of the allowed amount and her max- out-of pocket would be $1490. Per [facility], the final determination of the allowed amount is made at the time the claim is submitted to the patient's insurance.    As an example: We discussed that hypothetically if [employer]'s \"allowed amount\" for this test is $1000, [employer] would pay $500, and the patient would be responsible for the remaining $500 PLUS the $490 (since the total cost of testing was $1490).    I reviewed the above information with Reanna and that this information indicates that there will be some out-of-pocket cost but that an exact amount cannot be determined, but would not exceed $1490. Reanna is considering NOT pursuing testing for Rotor syndrome so long as not having this information will not affect her daughter's care postnatally. I have reached out to Dr. Narayan for confirmation.    Radha Mays MS, [facility]  Maternal Fetal Medicine  [facility]  Phone:[phone]      "

## 2022-05-02 ENCOUNTER — TELEPHONE (OUTPATIENT)
Dept: MATERNAL FETAL MEDICINE | Facility: CLINIC | Age: 37
End: 2022-05-02
Payer: COMMERCIAL

## 2022-05-02 NOTE — TELEPHONE ENCOUNTER
Called Reanna to discuss my conversation with Dr. Narayan regarding Rotor syndrome testing. Knowing if Reanna's daughter has Rotor syndrome (by sequencing KBVC8F9 and LJFQ2G6) is not required for her immediate care and this can be revisited after delivery if she has an elevated unconjugated bilirubin. Testing can be revisited postnatally. Reanna is comfortable not proceeding with sequencing PDAE9W8 and UPIF4N3 and provides permission to discard the remaining amniotic fluid sample that we have at both [facility] and [facility] cytogenetics laboratories.     Laboratories notified 5/2/22.      Radha Mays MS, [facility]  Maternal Fetal Medicine  [facility]  Phone:[phone]

## 2022-05-12 ENCOUNTER — OFFICE VISIT (OUTPATIENT)
Dept: MATERNAL FETAL MEDICINE | Facility: HOSPITAL | Age: 37
End: 2022-05-12
Attending: OBSTETRICS & GYNECOLOGY
Payer: COMMERCIAL

## 2022-05-12 ENCOUNTER — ANCILLARY PROCEDURE (OUTPATIENT)
Dept: ULTRASOUND IMAGING | Facility: HOSPITAL | Age: 37
End: 2022-05-12
Attending: OBSTETRICS & GYNECOLOGY
Payer: COMMERCIAL

## 2022-05-12 VITALS — DIASTOLIC BLOOD PRESSURE: 76 MMHG | HEART RATE: 98 BPM | SYSTOLIC BLOOD PRESSURE: 116 MMHG

## 2022-05-12 DIAGNOSIS — O35.BXX0 FETAL TETRALOGY OF FALLOT AFFECTING ANTEPARTUM CARE OF MOTHER, NOT APPLICABLE OR UNSPECIFIED FETUS: ICD-10-CM

## 2022-05-12 DIAGNOSIS — O36.5990 PREGNANCY AFFECTED BY FETAL GROWTH RESTRICTION: Primary | ICD-10-CM

## 2022-05-12 DIAGNOSIS — O35.BXX0 FETAL TETRALOGY OF FALLOT AFFECTING ANTEPARTUM CARE OF MOTHER, NOT APPLICABLE OR UNSPECIFIED FETUS: Primary | ICD-10-CM

## 2022-05-12 DIAGNOSIS — O28.5 ABNORMAL CHROMOSOMAL AND GENETIC FINDING ON ANTENATAL SCREENING OF MOTHER: ICD-10-CM

## 2022-05-12 PROCEDURE — 76816 OB US FOLLOW-UP PER FETUS: CPT | Mod: 26 | Performed by: OBSTETRICS & GYNECOLOGY

## 2022-05-12 PROCEDURE — 59025 FETAL NON-STRESS TEST: CPT | Mod: 26 | Performed by: OBSTETRICS & GYNECOLOGY

## 2022-05-12 PROCEDURE — 76816 OB US FOLLOW-UP PER FETUS: CPT

## 2022-05-12 PROCEDURE — 59025 FETAL NON-STRESS TEST: CPT

## 2022-05-12 PROCEDURE — 76820 UMBILICAL ARTERY ECHO: CPT | Mod: 26 | Performed by: OBSTETRICS & GYNECOLOGY

## 2022-05-12 PROCEDURE — 99207 PR NO CHARGE LOS: CPT | Performed by: OBSTETRICS & GYNECOLOGY

## 2022-05-12 NOTE — NURSING NOTE
NST Performed due to fetal growth restriction.   reviewed efm tracing. See NST/BPP Doc Flowsheet tab.

## 2022-05-20 ENCOUNTER — ANCILLARY PROCEDURE (OUTPATIENT)
Dept: ULTRASOUND IMAGING | Facility: HOSPITAL | Age: 37
End: 2022-05-20
Attending: OBSTETRICS & GYNECOLOGY
Payer: COMMERCIAL

## 2022-05-20 ENCOUNTER — OFFICE VISIT (OUTPATIENT)
Dept: MATERNAL FETAL MEDICINE | Facility: HOSPITAL | Age: 37
End: 2022-05-20
Attending: OBSTETRICS & GYNECOLOGY
Payer: COMMERCIAL

## 2022-05-20 DIAGNOSIS — O35.BXX0 FETAL TETRALOGY OF FALLOT AFFECTING ANTEPARTUM CARE OF MOTHER, NOT APPLICABLE OR UNSPECIFIED FETUS: ICD-10-CM

## 2022-05-20 DIAGNOSIS — O36.5990 PREGNANCY AFFECTED BY FETAL GROWTH RESTRICTION: Primary | ICD-10-CM

## 2022-05-20 DIAGNOSIS — O28.5 ABNORMAL CHROMOSOMAL AND GENETIC FINDING ON ANTENATAL SCREENING OF MOTHER: ICD-10-CM

## 2022-05-20 DIAGNOSIS — O36.5990 PREGNANCY AFFECTED BY FETAL GROWTH RESTRICTION: ICD-10-CM

## 2022-05-20 PROCEDURE — 76815 OB US LIMITED FETUS(S): CPT | Mod: 26 | Performed by: OBSTETRICS & GYNECOLOGY

## 2022-05-20 PROCEDURE — 99207 PR NO CHARGE LOS: CPT | Performed by: OBSTETRICS & GYNECOLOGY

## 2022-05-20 PROCEDURE — 76815 OB US LIMITED FETUS(S): CPT

## 2022-05-20 PROCEDURE — 59025 FETAL NON-STRESS TEST: CPT

## 2022-05-20 PROCEDURE — 59025 FETAL NON-STRESS TEST: CPT | Mod: 26 | Performed by: OBSTETRICS & GYNECOLOGY

## 2022-05-20 PROCEDURE — 76820 UMBILICAL ARTERY ECHO: CPT | Mod: 26 | Performed by: OBSTETRICS & GYNECOLOGY

## 2022-05-20 NOTE — PROGRESS NOTES
"Please see \"Imaging\" tab under \"Chart Review\" for details of today's visit.    Briana Russo    "

## 2022-05-24 NOTE — PROGRESS NOTES
Please see the imaging tab for details of the ultrasound performed today.    Niyah Ruiz MD  Specialist in Maternal-Fetal Medicine      
activity

## 2022-05-26 ENCOUNTER — OFFICE VISIT (OUTPATIENT)
Dept: MATERNAL FETAL MEDICINE | Facility: HOSPITAL | Age: 37
End: 2022-05-26
Attending: OBSTETRICS & GYNECOLOGY
Payer: COMMERCIAL

## 2022-05-26 ENCOUNTER — ANCILLARY PROCEDURE (OUTPATIENT)
Dept: ULTRASOUND IMAGING | Facility: HOSPITAL | Age: 37
End: 2022-05-26
Attending: OBSTETRICS & GYNECOLOGY
Payer: COMMERCIAL

## 2022-05-26 DIAGNOSIS — O35.BXX0 FETAL TETRALOGY OF FALLOT AFFECTING ANTEPARTUM CARE OF MOTHER, NOT APPLICABLE OR UNSPECIFIED FETUS: ICD-10-CM

## 2022-05-26 DIAGNOSIS — O28.5 ABNORMAL CHROMOSOMAL AND GENETIC FINDING ON ANTENATAL SCREENING OF MOTHER: ICD-10-CM

## 2022-05-26 DIAGNOSIS — O36.5990 PREGNANCY AFFECTED BY FETAL GROWTH RESTRICTION: ICD-10-CM

## 2022-05-26 PROCEDURE — 59025 FETAL NON-STRESS TEST: CPT

## 2022-05-26 PROCEDURE — 76820 UMBILICAL ARTERY ECHO: CPT | Mod: 26 | Performed by: OBSTETRICS & GYNECOLOGY

## 2022-05-26 PROCEDURE — 59025 FETAL NON-STRESS TEST: CPT | Mod: 26 | Performed by: OBSTETRICS & GYNECOLOGY

## 2022-05-26 PROCEDURE — 99207 PR NO CHARGE LOS: CPT | Performed by: OBSTETRICS & GYNECOLOGY

## 2022-05-26 PROCEDURE — 76815 OB US LIMITED FETUS(S): CPT | Mod: 26 | Performed by: OBSTETRICS & GYNECOLOGY

## 2022-05-26 PROCEDURE — 76820 UMBILICAL ARTERY ECHO: CPT

## 2022-06-02 ENCOUNTER — ANCILLARY PROCEDURE (OUTPATIENT)
Dept: ULTRASOUND IMAGING | Facility: HOSPITAL | Age: 37
End: 2022-06-02
Attending: OBSTETRICS & GYNECOLOGY
Payer: COMMERCIAL

## 2022-06-02 ENCOUNTER — OFFICE VISIT (OUTPATIENT)
Dept: MATERNAL FETAL MEDICINE | Facility: HOSPITAL | Age: 37
End: 2022-06-02
Attending: OBSTETRICS & GYNECOLOGY
Payer: COMMERCIAL

## 2022-06-02 DIAGNOSIS — O35.BXX0 FETAL TETRALOGY OF FALLOT AFFECTING ANTEPARTUM CARE OF MOTHER, NOT APPLICABLE OR UNSPECIFIED FETUS: ICD-10-CM

## 2022-06-02 DIAGNOSIS — O28.5 ABNORMAL CHROMOSOMAL AND GENETIC FINDING ON ANTENATAL SCREENING OF MOTHER: ICD-10-CM

## 2022-06-02 DIAGNOSIS — O36.5990 PREGNANCY AFFECTED BY FETAL GROWTH RESTRICTION: ICD-10-CM

## 2022-06-02 PROBLEM — O35.19X0: Status: ACTIVE | Noted: 2022-06-02

## 2022-06-02 PROCEDURE — 59025 FETAL NON-STRESS TEST: CPT | Mod: 26 | Performed by: OBSTETRICS & GYNECOLOGY

## 2022-06-02 PROCEDURE — 59025 FETAL NON-STRESS TEST: CPT

## 2022-06-02 PROCEDURE — 99207 PR NO CHARGE LOS: CPT | Performed by: OBSTETRICS & GYNECOLOGY

## 2022-06-02 PROCEDURE — 76820 UMBILICAL ARTERY ECHO: CPT

## 2022-06-02 PROCEDURE — 76816 OB US FOLLOW-UP PER FETUS: CPT | Mod: 26 | Performed by: OBSTETRICS & GYNECOLOGY

## 2022-06-02 PROCEDURE — 76820 UMBILICAL ARTERY ECHO: CPT | Mod: 26 | Performed by: OBSTETRICS & GYNECOLOGY

## 2022-06-09 ENCOUNTER — OFFICE VISIT (OUTPATIENT)
Dept: MATERNAL FETAL MEDICINE | Facility: HOSPITAL | Age: 37
End: 2022-06-09
Attending: OBSTETRICS & GYNECOLOGY
Payer: COMMERCIAL

## 2022-06-09 ENCOUNTER — ANCILLARY PROCEDURE (OUTPATIENT)
Dept: ULTRASOUND IMAGING | Facility: HOSPITAL | Age: 37
End: 2022-06-09
Attending: OBSTETRICS & GYNECOLOGY
Payer: COMMERCIAL

## 2022-06-09 DIAGNOSIS — O36.5990 PREGNANCY AFFECTED BY FETAL GROWTH RESTRICTION: Primary | ICD-10-CM

## 2022-06-09 DIAGNOSIS — O35.BXX0 FETAL TETRALOGY OF FALLOT AFFECTING ANTEPARTUM CARE OF MOTHER, NOT APPLICABLE OR UNSPECIFIED FETUS: ICD-10-CM

## 2022-06-09 DIAGNOSIS — O36.5990 PREGNANCY AFFECTED BY FETAL GROWTH RESTRICTION: ICD-10-CM

## 2022-06-09 DIAGNOSIS — O28.5 ABNORMAL CHROMOSOMAL AND GENETIC FINDING ON ANTENATAL SCREENING OF MOTHER: ICD-10-CM

## 2022-06-09 PROCEDURE — 99207 PR NO CHARGE LOS: CPT | Performed by: OBSTETRICS & GYNECOLOGY

## 2022-06-09 PROCEDURE — 59025 FETAL NON-STRESS TEST: CPT | Mod: 59

## 2022-06-09 PROCEDURE — 76819 FETAL BIOPHYS PROFIL W/O NST: CPT

## 2022-06-09 PROCEDURE — 76818 FETAL BIOPHYS PROFILE W/NST: CPT | Mod: 26 | Performed by: OBSTETRICS & GYNECOLOGY

## 2022-06-09 PROCEDURE — 76820 UMBILICAL ARTERY ECHO: CPT | Mod: 26 | Performed by: OBSTETRICS & GYNECOLOGY

## 2022-06-09 NOTE — PROGRESS NOTES
"Please see \"Imaging\" tab under \"Chart Review\" for details of today's US.    Aide Menard, DO    "

## 2022-06-15 ENCOUNTER — OFFICE VISIT (OUTPATIENT)
Dept: MATERNAL FETAL MEDICINE | Facility: CLINIC | Age: 37
End: 2022-06-15
Attending: OBSTETRICS & GYNECOLOGY
Payer: COMMERCIAL

## 2022-06-15 ENCOUNTER — HOSPITAL ENCOUNTER (OUTPATIENT)
Dept: ULTRASOUND IMAGING | Facility: CLINIC | Age: 37
Discharge: HOME | End: 2022-06-15
Attending: OBSTETRICS & GYNECOLOGY
Payer: COMMERCIAL

## 2022-06-15 VITALS
WEIGHT: 161.4 LBS | SYSTOLIC BLOOD PRESSURE: 110 MMHG | TEMPERATURE: 97.5 F | HEART RATE: 88 BPM | BODY MASS INDEX: 28.59 KG/M2 | DIASTOLIC BLOOD PRESSURE: 53 MMHG

## 2022-06-15 DIAGNOSIS — O28.5 ABNORMAL CHROMOSOMAL AND GENETIC FINDING ON ANTENATAL SCREENING OF MOTHER: ICD-10-CM

## 2022-06-15 DIAGNOSIS — O35.BXX0 FETAL TETRALOGY OF FALLOT AFFECTING ANTEPARTUM CARE OF MOTHER, NOT APPLICABLE OR UNSPECIFIED FETUS: Primary | ICD-10-CM

## 2022-06-15 DIAGNOSIS — O35.19X0 22Q11.2 DELETION, FETAL, AFFECTING MATERNAL CARE: ICD-10-CM

## 2022-06-15 DIAGNOSIS — O35.BXX0 FETAL TETRALOGY OF FALLOT AFFECTING ANTEPARTUM CARE OF MOTHER, NOT APPLICABLE OR UNSPECIFIED FETUS: ICD-10-CM

## 2022-06-15 DIAGNOSIS — O36.5990 PREGNANCY AFFECTED BY FETAL GROWTH RESTRICTION: ICD-10-CM

## 2022-06-15 DIAGNOSIS — O35.BXX0 ANOMALY OF HEART OF FETUS AFFECTING PREGNANCY, ANTEPARTUM, SINGLE OR UNSPECIFIED FETUS: Primary | ICD-10-CM

## 2022-06-15 PROCEDURE — 99213 OFFICE O/P EST LOW 20 MIN: CPT | Mod: 25 | Performed by: ADVANCED PRACTICE MIDWIFE

## 2022-06-15 PROCEDURE — G0463 HOSPITAL OUTPT CLINIC VISIT: HCPCS | Mod: 25

## 2022-06-15 PROCEDURE — 76818 FETAL BIOPHYS PROFILE W/NST: CPT | Mod: 26 | Performed by: OBSTETRICS & GYNECOLOGY

## 2022-06-15 PROCEDURE — 76819 FETAL BIOPHYS PROFIL W/O NST: CPT

## 2022-06-15 PROCEDURE — 76815 OB US LIMITED FETUS(S): CPT | Mod: 26 | Performed by: OBSTETRICS & GYNECOLOGY

## 2022-06-15 PROCEDURE — 59025 FETAL NON-STRESS TEST: CPT | Mod: 59

## 2022-06-15 PROCEDURE — 76820 UMBILICAL ARTERY ECHO: CPT | Mod: 26 | Performed by: OBSTETRICS & GYNECOLOGY

## 2022-06-15 NOTE — NURSING NOTE
Reanna here in clinic today for BPP/UAR/NST and OBV at 32w2d gestation d/t pregnancy c/b fetal CHD, FGR, 2VC, 22q deletion syndrome. VSS. Reports +FM. Denies LOF, vaginal bleeding and ctx. NST reactive with variable decel, Dr. Menard reviewed NST. Dr. Menard reviewed BPP/UAR, see note. Plan for growth/UAR/BPP in one week, weekly NST/UAR/BPP, and bi-weekly OBV. NICU consult scheduled for 7/13 at 0900. Request for consult with Dr. Pepe Keane in to see patient for OBV, see note.

## 2022-06-15 NOTE — PROGRESS NOTES
Maternal-Fetal Medicine   First OB Visit    Reanna Gilbert  : 1985  MRN: [id number]      HPI:  Reanna Gilbert is a 36 year old  at 32w2d by LMP consistent with 11w1dd US here for new OB visit     Today Reanna Gilbert is feeling well. She offers no pregnancy concerns. Reports fetal movement, denies contractions, LOF, or VB.        Prenatal Care:  Primary OB care this pregnancy has been with Dr. Lee from [facility].    Dating:    LMP: 22 approxiamte  Dating ultrasound: 22 at 11w1d   Assisted reproduction: none  Assigned EDC: 22 by LMP      OB HISTORY  OB History    Para Term  AB Living   1 0 0 0 0 0   SAB IAB Ectopic Multiple Live Births   0 0 0 0 0      # Outcome Date GA Lbr Sanjay/2nd Weight Sex Delivery Anes PTL Lv   1 Current                History of GDM: No,  PTL : No,  History of HTN in pregnancy: No,  Thrombocytopenia: No,  Shoulder dystocia: No,  Vacuum Extraction: No  PPH: No   3rd of 4th degree laceration: No.   Other complications: No    Gynecologic History:  - Last Pap on unknown date, but per patient report NIL and HPV neg. Reports a history of an abnormal pap years ago with +HPV status that has resolved.  - Denies prior cervical surgery or procedures  - Denies any history of frequent UTIs, vaginal infections, or STIs    Past Medical History:  Past Medical History:   Diagnosis Date     Known health problems: none        Past Surgical History:  Past Surgical History:   Procedure Laterality Date     ARTHROSCOPIC REPAIR ACL  2001     wisdom teeth  2004         INFECTION HISTORY  HIV: No  Hepatitis B: No  Hepatitis C: No  Tuberculosis: No    Genital Herpes self: no  Herpes partner:  no  Chlamydia:  no  Gonorrhea:  no  HPV: Yes - patient reports resolution with most recent pap  BV:  Yes - in early pregnancy. Treated with metrogel.  Syphilis:  No    Current Medications:  Prior to Admission medications    Medication Sig Last Dose Taking? Auth Provider Long  Term End Date   aspirin (ASA) 325 MG EC tablet Take 1 tablet (325 mg) by mouth daily   Shirley Hull MD     azelastine (ASTELIN) 0.1 % nasal spray Spray 2 sprays into both nostrils 2 times daily as needed for rhinitis   Canelo Tuttle MD     EPINEPHrine (ANY BX GENERIC EQUIV) 0.3 MG/0.3ML injection 2-pack Inject 0.3 mLs (0.3 mg) into the muscle as needed for anaphylaxis  Patient not taking: Reported on 4/28/2020   Canelo Tuttle MD     fexofenadine (ALLEGRA) 180 MG tablet Take 180 mg by mouth daily    Reported, Patient     fluticasone (FLONASE) 50 MCG/ACT nasal spray Spray 2 sprays into both nostrils daily   Canelo Tuttle MD     ORDER FOR ALLERGEN IMMUNOTHERAPY Name of Mix: Mix #1  Tree , Weeds  Birch Mix PRW 1:20 w/v, HS  0.5 ml  Boxelder-Maple Mix BHR (Boxelder Hard Red) 1:20 w/v, HS  0.5 ml  Garza, Common 1:20 w/v, HS  0.5 ml  Pine, White 1:20 w/v, ALK 0.5 ml  Kochia 1:20 w/v, HS 0.5 ml  Plantain, English 1:20 w/v, HS 0.5 ml  Ragweed Mixed 1:20 w/v ALK  0.5 ml  Diluent: HSA qs to 5ml   Canelo Tuttle MD     ORDER FOR ALLERGEN IMMUNOTHERAPY Name of Mix: Mix #2  Dust Mite, Cat, Dog, Grass  Cat Hair, Standardized 10,000 BAU/mL, ALK  2.0 ml  Dog Hair-Dander, A. P.  1:100 w/v, HS  1.0 ml  Dust Mites DP. 10,000 AU/mL, HS  0.5 ml   Adis Grass (Std) 100,000 BAU/mL, HS 0.3 ml  Diluent: HSA qs to 5ml   Canelo Tuttle MD     ORDER FOR ALLERGEN IMMUNOTHERAPY Name of Mix: Mix #3  Mold  Epicoccum Nigrum 1:10 w/v, HS 0.5 ml  Diluent: HSA qs to 5ml   Canelo Tuttle MD     SUMAtriptan (IMITREX) 50 MG tablet Take 1 tablet (50 mg) by mouth at onset of headache for migraine   Billy Coronado MD           Allergies:  Patient has no known allergies.    Social History:   Denies use of alcohol, drugs or smoking.      ROS:  10-point ROS negative except as in HPI       PHYSICAL EXAM:  LMP 11/01/2021   Gen: NAD, well appearing  Respiratory: breathing unlabored, no SOB,  lungs clear   Abdomen: gravid,non-tender,  "non-distended  Extremities: WNL      Ultrasounds:   Please see \"imaging\" tab under chart review for today's ultrasound results.      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 Reviewed that [facility] is a multidisciplinary institution and her care will be collaborative in fashion with [facility] doctors, CNM, fellows, residents, and [facility] clinic for intrapartum management.     #CHD:  #22q Deletion:  - NIPT pos for 22q deletion and confirmed via amniocentesis.  - Fetal echo on 4/15:  Tetralogy of Fallot with pulmonary atresia. Large conoseptal ventricular  septal defect. There is retrograde flow across a likely ductus arteriosus,  though the native branch pulmonary arteries are not well-seen. Leftard aortic  arch. 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 Normal right and left ventricular size  and systolic function. No atrioventricular valve regurgitation, no evidence of  diastolic dysfunction, no effusion.  - Planning upcoming visit with NICU/SW and arranging consult with peds surgery.    #Routine PNC:  - Prenatal labs:   Rh: +  antibody: neg   HepB/HIV/RPR/HepC: nonreactive   GC/CT: negative   Rubella: immune     GCT: per patient report, pass. Requesting update records from [employer]  - Immunizations: s/p Covid; will verify Tdap status when updated record are received.    # Surveillance:  - Weekly limited US with UAR and NST.  - Weekly BPPs  - Serial growth US. Next in 1 week.    #Delivery Planning:  - Reviewed that timing of delivery will be dependent on severity of FGR and will likely be between 37-39 weeks.   - Vaginal delivery is the preferred " mode of delivery and c/s should be reserved for routine obstetrical indications.  - Labor analgesia: briefly discussed both non-pharmacologic and pharmacologic options for labor. Patient will continue to think about options and we can review in more detail at an upcoming visit.  - Feeding: desires to breastfeed as able  - Contraception: needs to be discussed    Leslie Keane CNM on 6/15/2022 at 10:07 AM    20 minutes spent on the date of the encounter, doing chart review, history and exam, documentation and further activities as noted.

## 2022-06-22 ENCOUNTER — ANCILLARY PROCEDURE (OUTPATIENT)
Dept: ULTRASOUND IMAGING | Facility: HOSPITAL | Age: 37
End: 2022-06-22
Attending: ADVANCED PRACTICE MIDWIFE
Payer: COMMERCIAL

## 2022-06-22 ENCOUNTER — OFFICE VISIT (OUTPATIENT)
Dept: MATERNAL FETAL MEDICINE | Facility: HOSPITAL | Age: 37
End: 2022-06-22
Attending: ADVANCED PRACTICE MIDWIFE
Payer: COMMERCIAL

## 2022-06-22 DIAGNOSIS — O36.5990 PREGNANCY AFFECTED BY FETAL GROWTH RESTRICTION: Primary | ICD-10-CM

## 2022-06-22 DIAGNOSIS — O35.BXX0 FETAL TETRALOGY OF FALLOT AFFECTING ANTEPARTUM CARE OF MOTHER, NOT APPLICABLE OR UNSPECIFIED FETUS: ICD-10-CM

## 2022-06-22 PROCEDURE — 76819 FETAL BIOPHYS PROFIL W/O NST: CPT

## 2022-06-22 PROCEDURE — 76818 FETAL BIOPHYS PROFILE W/NST: CPT | Mod: 26 | Performed by: OBSTETRICS & GYNECOLOGY

## 2022-06-22 PROCEDURE — 76816 OB US FOLLOW-UP PER FETUS: CPT | Mod: 26 | Performed by: OBSTETRICS & GYNECOLOGY

## 2022-06-22 PROCEDURE — 99207 PR NO CHARGE LOS: CPT | Performed by: OBSTETRICS & GYNECOLOGY

## 2022-06-22 PROCEDURE — 59025 FETAL NON-STRESS TEST: CPT

## 2022-06-22 NOTE — PROGRESS NOTES
The patient was seen for an ultrasound in the Maternal-Fetal Medicine Center at the [facility] today.  For a detailed report of the ultrasound examination, please see the ultrasound report which can be found under the imaging tab.    Tamie Love MD  , OB/GYN  Maternal-Fetal Medicine  [phone] (Pager)

## 2022-06-30 ENCOUNTER — OFFICE VISIT (OUTPATIENT)
Dept: MATERNAL FETAL MEDICINE | Facility: CLINIC | Age: 37
End: 2022-06-30
Attending: OBSTETRICS & GYNECOLOGY
Payer: COMMERCIAL

## 2022-06-30 ENCOUNTER — HOSPITAL ENCOUNTER (OUTPATIENT)
Dept: ULTRASOUND IMAGING | Facility: CLINIC | Age: 37
Discharge: HOME | End: 2022-06-30
Attending: ADVANCED PRACTICE MIDWIFE
Payer: COMMERCIAL

## 2022-06-30 ENCOUNTER — OFFICE VISIT (OUTPATIENT)
Dept: MATERNAL FETAL MEDICINE | Facility: CLINIC | Age: 37
End: 2022-06-30
Attending: ADVANCED PRACTICE MIDWIFE
Payer: COMMERCIAL

## 2022-06-30 VITALS
OXYGEN SATURATION: 97 % | SYSTOLIC BLOOD PRESSURE: 110 MMHG | RESPIRATION RATE: 16 BRPM | BODY MASS INDEX: 29.02 KG/M2 | DIASTOLIC BLOOD PRESSURE: 69 MMHG | HEART RATE: 89 BPM | WEIGHT: 163.8 LBS

## 2022-06-30 DIAGNOSIS — O35.19X0 22Q11.2 DELETION, FETAL, AFFECTING MATERNAL CARE: ICD-10-CM

## 2022-06-30 DIAGNOSIS — O35.BXX0 FETAL TETRALOGY OF FALLOT AFFECTING ANTEPARTUM CARE OF MOTHER, NOT APPLICABLE OR UNSPECIFIED FETUS: ICD-10-CM

## 2022-06-30 DIAGNOSIS — O35.BXX0 ANOMALY OF HEART OF FETUS AFFECTING PREGNANCY, ANTEPARTUM, SINGLE OR UNSPECIFIED FETUS: Primary | ICD-10-CM

## 2022-06-30 DIAGNOSIS — O36.5990 PREGNANCY AFFECTED BY FETAL GROWTH RESTRICTION: ICD-10-CM

## 2022-06-30 PROCEDURE — 59025 FETAL NON-STRESS TEST: CPT

## 2022-06-30 PROCEDURE — G0463 HOSPITAL OUTPT CLINIC VISIT: HCPCS | Mod: 25

## 2022-06-30 PROCEDURE — 76819 FETAL BIOPHYS PROFIL W/O NST: CPT | Mod: 26 | Performed by: OBSTETRICS & GYNECOLOGY

## 2022-06-30 PROCEDURE — 76815 OB US LIMITED FETUS(S): CPT | Mod: 26 | Performed by: OBSTETRICS & GYNECOLOGY

## 2022-06-30 PROCEDURE — 59025 FETAL NON-STRESS TEST: CPT | Mod: 26 | Performed by: OBSTETRICS & GYNECOLOGY

## 2022-06-30 PROCEDURE — 76819 FETAL BIOPHYS PROFIL W/O NST: CPT

## 2022-06-30 RX ORDER — CETIRIZINE HYDROCHLORIDE 10 MG/1
1 TABLET ORAL DAILY
COMMUNITY

## 2022-06-30 ASSESSMENT — PAIN SCALES - GENERAL: PAINLEVEL: NO PAIN (0)

## 2022-06-30 NOTE — PROGRESS NOTES
I did not see this patient today. No MFM visit billed at this time.   Continue with close OB surveillance and plan for delivery at around 38 weeks given FGR but with fetal CHD present.     Agustin Nieto MD

## 2022-06-30 NOTE — NURSING NOTE
Reanna seen in clinic today for NST/BPP/UAR/OB visit at 34w3d gestation due to pregnancy c/b fetal CHD, 22 q deletion syndrome, TOF, FGR, 2 VC(see report/notes). VSS. Pt reports + FM. Pt denies bldg/lof/change in discharge/contractions/headache/vision changes/chest pain/SOB/edema. Dr. Ferreira met with pt and discussed POC. Contact information for MFM and [facility] reviewed. Map of [place] reviewed. Patient folder, fetal kick counts, BPP, HTN in pregnancy, med in pregnancy also reviewed. Plan to continue weekly NST/UAR/BPP. Pt is scheduled to meet with peds cardiac surgeon on 7/8 and for a NICU/SW consult on 7/13.  Pt discharged stable and ambulatory.     Martha Parsons RN

## 2022-06-30 NOTE — PROGRESS NOTES
"Maternal-Fetal Medicine Return OB Visit    Reanna Gilbert  : 1985  MRN: [id number]    HPI:  Reanna Gilbert is a 36 year old  at 34w3d by LMP consistent with 11w1d US here for follow-up OB visit.    She overall is doing well and has no complaints or concerns. Endorses normal FM. Denies VB, LOF, or regular cxns.       PHYSICAL EXAM:  LMP 2021      Gen: NAD, well appearing  Respiratory: breathing unlabored, no SOB,  lungs clear   Abdomen: gravid,non-tender, non-distended  Extremities: WNL    Ultrasounds:   Please see \"imaging\" tab under chart review for today's ultrasound results.      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  - Fetal echo on 4/15:Tetralogy of Fallot with pulmonary atresia. Large conoseptal ventricular septal defect. There is retrograde flow across a likely ductus arteriosus,  though the native branch pulmonary arteries are not well-seen. Leftard aortic arch. 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 Normal right and left ventricular size and systolic function. No atrioventricular valve regurgitation, no evidence of diastolic dysfunction, no effusion.  - Planning upcoming visit with NICU/SW and pediatric CV surgery on  and , respectively  - Weekly limited US with UAR and NST.  - Weekly BPPs    Routine PNC  - Prenatal labs:   Rh: +  antibody: neg   HepB/HIV/RPR/HepC: nonreactive   GC/CT: negative   Rubella: immune     GCT: normal  - Immunizations: s/p Covid. Need to discuss TDaP at next visit. Reviewed prenatal records after visit " and do not see this documented.   - GBS/cervical exam at next visit.    Delivery Planning  - Discussed that if EFW remains >3% but less than 10% with normal dopplers, plan for delivery at 38-39 weeks. Will plan to schedule IOL at next visit. Discussed risk of  due to FGR/fetal intolerance of labor. Discussed generalities of induction of labor in nulliparous patient.   - Labor analgesia: previously discussed non-pharmacologic and pharmacologic options for labor.   - Feeding: desires to breastfeed as able  - Contraception: needs to be discussed    Wie Ferreira MD on 6/15/2022 at 10:07 AM    20 minutes spent on the date of the encounter, doing chart review, history and exam, documentation and further activities as noted.

## 2022-06-30 NOTE — PROGRESS NOTES
Please see full imaging report from [employer] program under imaging tab.      Agustin Nieto MD  Maternal Fetal Medicine

## 2022-07-06 ENCOUNTER — OFFICE VISIT (OUTPATIENT)
Dept: MATERNAL FETAL MEDICINE | Facility: HOSPITAL | Age: 37
End: 2022-07-06
Attending: ADVANCED PRACTICE MIDWIFE
Payer: COMMERCIAL

## 2022-07-06 ENCOUNTER — ANCILLARY PROCEDURE (OUTPATIENT)
Dept: ULTRASOUND IMAGING | Facility: HOSPITAL | Age: 37
End: 2022-07-06
Attending: ADVANCED PRACTICE MIDWIFE
Payer: COMMERCIAL

## 2022-07-06 DIAGNOSIS — O36.5990 PREGNANCY AFFECTED BY FETAL GROWTH RESTRICTION: Primary | ICD-10-CM

## 2022-07-06 DIAGNOSIS — O35.19X0 22Q11.2 DELETION, FETAL, AFFECTING MATERNAL CARE: ICD-10-CM

## 2022-07-06 DIAGNOSIS — O35.BXX0 FETAL TETRALOGY OF FALLOT AFFECTING ANTEPARTUM CARE OF MOTHER, NOT APPLICABLE OR UNSPECIFIED FETUS: ICD-10-CM

## 2022-07-06 PROCEDURE — 76820 UMBILICAL ARTERY ECHO: CPT | Mod: 26 | Performed by: OBSTETRICS & GYNECOLOGY

## 2022-07-06 PROCEDURE — 76820 UMBILICAL ARTERY ECHO: CPT

## 2022-07-06 PROCEDURE — 76818 FETAL BIOPHYS PROFILE W/NST: CPT

## 2022-07-06 PROCEDURE — 76815 OB US LIMITED FETUS(S): CPT | Mod: 26 | Performed by: OBSTETRICS & GYNECOLOGY

## 2022-07-06 PROCEDURE — 99207 PR NO CHARGE LOS: CPT | Performed by: OBSTETRICS & GYNECOLOGY

## 2022-07-06 PROCEDURE — 59025 FETAL NON-STRESS TEST: CPT | Mod: 26 | Performed by: OBSTETRICS & GYNECOLOGY

## 2022-07-06 NOTE — NURSING NOTE
NST Performed due to fetal growth restriction.  Dr. Love reviewed efm tracing. See NST/BPP Doc Flowsheet tab.

## 2022-07-06 NOTE — PROGRESS NOTES
The patient was seen for an ultrasound in the Maternal-Fetal Medicine Center at the [facility] today.  For a detailed report of the ultrasound examination, please see the ultrasound report which can be found under the imaging tab.    Tamie Love MD  , OB/GYN  Maternal-Fetal Medicine  [phone] (Pager)

## 2022-07-08 ENCOUNTER — OFFICE VISIT (OUTPATIENT)
Dept: PEDIATRIC CARDIOLOGY | Facility: CLINIC | Age: 37
End: 2022-07-08
Attending: THORACIC SURGERY (CARDIOTHORACIC VASCULAR SURGERY)
Payer: COMMERCIAL

## 2022-07-08 VITALS
DIASTOLIC BLOOD PRESSURE: 74 MMHG | WEIGHT: 166.45 LBS | RESPIRATION RATE: 18 BRPM | OXYGEN SATURATION: 100 % | HEART RATE: 80 BPM | BODY MASS INDEX: 28.42 KG/M2 | SYSTOLIC BLOOD PRESSURE: 121 MMHG | HEIGHT: 64 IN

## 2022-07-08 DIAGNOSIS — O35.BXX0 FETUS WITH COMPLEX CONGENITAL HEART DISEASE, ANTEPARTUM, SINGLE OR UNSPECIFIED FETUS: Primary | ICD-10-CM

## 2022-07-08 PROCEDURE — 99202 OFFICE O/P NEW SF 15 MIN: CPT | Performed by: THORACIC SURGERY (CARDIOTHORACIC VASCULAR SURGERY)

## 2022-07-08 PROCEDURE — G0463 HOSPITAL OUTPT CLINIC VISIT: HCPCS

## 2022-07-08 NOTE — LETTER
Date:July 11, 2022      Patient was self referred, no letter generated. Do not send.        [facility] Health Information

## 2022-07-08 NOTE — LETTER
2022      RE: Reanna Gilbert  [address] [address] [facility] [place] [address]     Dear Colleague,    Thank you for the opportunity to participate in the care of your patient, Reanna Gilbert, at the [facility] at [facility]. Please see a copy of my visit note below.    This is a prenatal consultation for fetus with TOF/PA/MAPCAs.  A discussion of the spectrum of the disease and broad discussion of treatment pathways was carried out.  All questions were answered.    Plan: Planning of surgical intervention based on  anatomy and physiology      Please do not hesitate to contact me if you have any questions/concerns.     Sincerely,       Moise Cantu MD

## 2022-07-08 NOTE — PATIENT INSTRUCTIONS
[facility]  [address]  [facility]  [address]  [place] [address] [phone]      Cardiology Clinic   RN Care Coordinators, Kathrin Cruz (Bre) or Lydia Suárez  [phone]  Pediatric Call Center/Scheduling  [phone]    After Hours and Emergency Contact Number  [phone]  * Ask for the pediatric cardiologist on call         Prescription Renewals  The pharmacy must fax requests to [phone]  * Please allow 3-4 days for prescriptions to be authorized     Imaging Scheduling for Peds Cardiology  [place] [phone]  SHE WILL REACH OUT TO YOU TO SCHEDULE ANY IMAGING NEEDS THAT WERE ORDERED.    Your feedback is very important to us. If you receive a survey about your visit today, please take the time to fill this out so we can continue to improve.

## 2022-07-08 NOTE — NURSING NOTE
"Chief Complaint   Patient presents with     Consult     /74 (BP Location: Right arm, Patient Position: Sitting, Cuff Size: Adult Regular)   Pulse 80   Resp 18   Ht 5' 3.5\" (161.3 cm)   Wt 166 lb 7.2 oz (75.5 kg)   LMP 11/01/2021   SpO2 100%   BMI 29.02 kg/m       Yvonne Montes LPN  July 8, 2022  "

## 2022-07-08 NOTE — PROGRESS NOTES
This is a prenatal consultation for fetus with TOF/PA/MAPCAs.  A discussion of the spectrum of the disease and broad discussion of treatment pathways was carried out.  All questions were answered.    Plan: Planning of surgical intervention based on  anatomy and physiology

## 2022-07-13 ENCOUNTER — HOSPITAL ENCOUNTER (OUTPATIENT)
Dept: ULTRASOUND IMAGING | Facility: CLINIC | Age: 37
Discharge: HOME | End: 2022-07-13
Attending: OBSTETRICS & GYNECOLOGY
Payer: COMMERCIAL

## 2022-07-13 ENCOUNTER — OFFICE VISIT (OUTPATIENT)
Dept: MATERNAL FETAL MEDICINE | Facility: CLINIC | Age: 37
End: 2022-07-13
Attending: ADVANCED PRACTICE MIDWIFE
Payer: COMMERCIAL

## 2022-07-13 VITALS
WEIGHT: 167.3 LBS | OXYGEN SATURATION: 97 % | BODY MASS INDEX: 29.17 KG/M2 | RESPIRATION RATE: 18 BRPM | SYSTOLIC BLOOD PRESSURE: 121 MMHG | DIASTOLIC BLOOD PRESSURE: 74 MMHG | HEART RATE: 97 BPM

## 2022-07-13 DIAGNOSIS — O35.BXX0 FETAL TETRALOGY OF FALLOT AFFECTING ANTEPARTUM CARE OF MOTHER, NOT APPLICABLE OR UNSPECIFIED FETUS: ICD-10-CM

## 2022-07-13 DIAGNOSIS — O35.BXX0 ANOMALY OF HEART OF FETUS AFFECTING PREGNANCY, ANTEPARTUM, SINGLE OR UNSPECIFIED FETUS: ICD-10-CM

## 2022-07-13 DIAGNOSIS — O09.93 SUPERVISION OF HIGH RISK PREGNANCY IN THIRD TRIMESTER: Primary | ICD-10-CM

## 2022-07-13 DIAGNOSIS — O36.5990 PREGNANCY AFFECTED BY FETAL GROWTH RESTRICTION: ICD-10-CM

## 2022-07-13 DIAGNOSIS — O35.19X0 22Q11.2 DELETION, FETAL, AFFECTING MATERNAL CARE: ICD-10-CM

## 2022-07-13 PROCEDURE — 59025 FETAL NON-STRESS TEST: CPT | Mod: 26 | Performed by: OBSTETRICS & GYNECOLOGY

## 2022-07-13 PROCEDURE — 59025 FETAL NON-STRESS TEST: CPT

## 2022-07-13 PROCEDURE — 76816 OB US FOLLOW-UP PER FETUS: CPT | Mod: 26 | Performed by: OBSTETRICS & GYNECOLOGY

## 2022-07-13 PROCEDURE — G0463 HOSPITAL OUTPT CLINIC VISIT: HCPCS | Mod: 25

## 2022-07-13 PROCEDURE — 87653 STREP B DNA AMP PROBE: CPT | Performed by: ADVANCED PRACTICE MIDWIFE

## 2022-07-13 PROCEDURE — 76820 UMBILICAL ARTERY ECHO: CPT | Mod: 26 | Performed by: OBSTETRICS & GYNECOLOGY

## 2022-07-13 PROCEDURE — 99214 OFFICE O/P EST MOD 30 MIN: CPT | Mod: 25 | Performed by: ADVANCED PRACTICE MIDWIFE

## 2022-07-13 PROCEDURE — 99203 OFFICE O/P NEW LOW 30 MIN: CPT | Performed by: PEDIATRICS

## 2022-07-13 PROCEDURE — 76820 UMBILICAL ARTERY ECHO: CPT

## 2022-07-13 ASSESSMENT — PATIENT HEALTH QUESTIONNAIRE - PHQ9: SUM OF ALL RESPONSES TO PHQ QUESTIONS 1-9: 2

## 2022-07-13 ASSESSMENT — PAIN SCALES - GENERAL: PAINLEVEL: NO PAIN (0)

## 2022-07-13 NOTE — NURSING NOTE
Reanna seen in clinic today for NICU/SW consult, follow up growth US, and prenatal visit at 36w2d gestation due to pregnancy c/b fetal TOF, 22q deletion syndrome and FGR (see report/notes). Pt met with Dr. Stephen and KELLIE Rooney for NICU consult. NST and ultrasound performed and reviewed by Dr. Deluca. Leslie Keane CNM met with patient also. VSS. Pt reports + FM. Pt denies bldg/lof/change in discharge/contractions/headache/vision changes/chest pain/SOB/edema. Plan to return to [facility] on Monday 7/18 for NST/UAR/OB visit. IOL scheduled for Wednesday 7/20 at 0730am. NICU aware.  Pt discharged stable and ambulatory.     Martha Parsons RN

## 2022-07-13 NOTE — PROGRESS NOTES
"Maternal fetal Medicine OB Follow up visit.     Reanna Gilbert  : 1985  MRN: [id number]    CC: OB Follow-up    Subjective:  Reanna Gilbert is a 36 year old  at 36w2d presenting for routine OB follow-up. Today, she is doing well. Here with her , Rigo. She is understandably disappointed to learn that the EFW fell below the 3rd%tile. They have questions about what the induction process will entail.    Patient denies regular, painful contractions, denies loss of fluid or vaginal bleeding.  Reports fetal movement.        OB Hx:  OB History    Para Term  AB Living   1 0 0 0 0 0   SAB IAB Ectopic Multiple Live Births   0 0 0 0 0      # Outcome Date GA Lbr Sanjay/2nd Weight Sex Delivery Anes PTL Lv   1 Current                  Objective:  Vitals: see flowsheet  Gen: alert, oriented, NAD  Skin: warm, dry, intact  Respiratory: breathing unlabored, no SOB  Abdominal: gravid, non-tender  Pelvic: deferred  Extremities: WNL  Psych: mood WNL, behavior WNL      OB Ultrasound:  Please see \"imaging\" tab under chart review for today's ultrasound results.      Assessment/Plan:  36 year old  at 36w2d here for follow OB visit.    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  - Fetal echo on 4/15:Tetralogy of Fallot with pulmonary atresia. Large conoseptal ventricular septal defect. There is retrograde flow across a likely ductus arteriosus,  though the native branch pulmonary arteries are not well-seen. Leftard aortic arch. 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 Normal right and left ventricular size and systolic function. No " atrioventricular valve regurgitation, no evidence of diastolic dysfunction, no effusion.  - S/p NICU/SW and pediatric CV surgery visits on  and , respectively    Routine PNC  - Prenatal labs:               Rh: +  antibody: neg               HepB/HIV/RPR/HepC: nonreactive               GC/CT: negative               Rubella: immune                 GCT: normal  - Immunizations: s/p Covid. Need to discuss TDaP at next visit. Reviewed prenatal records after visit and do not see this documented.   - GBS/cervical exam at next visit.     Surveillance  - No further growth US indicated at this time. EFW 2%tile (2089g) AC 2%tile  - Increase to twice weekly limited US/NST/UAR dopplers due to severe FGR. Will need one more visit next week prior to IOL.     Delivery Planning  - Reviewed new severe FGR diagnosis and recommendation of delivery in the 37th week. IOL scheduled for  at 0730.   - Reviewed methods of IOL including cervical ripening and pitocin.   - Labor analgesia: reviewed pain medication options and patient believes she will opt for an epidural for active labor.   - Feeding: desires to breastfeed as able    35 minutes spent on the date of the encounter, doing chart review, history and exam, documentation and further activities as noted.      Leslie Keane CNM on 2022 at 12:39 PM

## 2022-07-13 NOTE — PROGRESS NOTES
"Maternal fetal Medicine OB Follow up visit.     Reanna Gilbert  : 1985  MRN: [id number]    CC: OB Follow-up    Subjective:  Reanna Gilbert is a 36 year old  at 36w2d presenting for routine OB follow-up. Today, she is doing well. Here with her , Rigo. She is understandably disappointed to learn that the EFW fell below the 3rd%tile. They have questions about what the induction process will entail.    Patient denies regular, painful contractions, denies loss of fluid or vaginal bleeding.  Reports fetal movement.        OB Hx:  OB History    Para Term  AB Living   1 0 0 0 0 0   SAB IAB Ectopic Multiple Live Births   0 0 0 0 0      # Outcome Date GA Lbr Sanjay/2nd Weight Sex Delivery Anes PTL Lv   1 Current                  Objective:  Vitals: see flowsheet  Gen: alert, oriented, NAD  Skin: warm, dry, intact  Respiratory: breathing unlabored, no SOB  Abdominal: gravid, non-tender  Pelvic: deferred  Extremities: WNL  Psych: mood WNL, behavior WNL      OB Ultrasound:  Please see \"imaging\" tab under chart review for today's ultrasound results.      Assessment/Plan:  36 year old  at 36w2d here for follow OB visit.    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  - Fetal echo on 4/15:Tetralogy of Fallot with pulmonary atresia. Large conoseptal ventricular septal defect. There is retrograde flow across a likely ductus arteriosus,  though the native branch pulmonary arteries are not well-seen. Leftard aortic arch. 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 Normal right and left ventricular size and systolic function. No " atrioventricular valve regurgitation, no evidence of diastolic dysfunction, no effusion.  - S/p NICU/SW and pediatric CV surgery visits on  and , respectively    Routine PNC  - Prenatal labs:               Rh: +  antibody: neg               HepB/HIV/RPR/HepC: nonreactive               GC/CT: negative               Rubella: immune                 GCT: normal  - Immunizations: s/p Covid. Need to discuss TDaP at next visit. Reviewed prenatal records after visit and do not see this documented.   - GBS/cervical exam at next visit.     Surveillance  - No further growth US indicated at this time. EFW 2%tile (2089g) AC 2%tile  - Increase to twice weekly limited US/NST/UAR dopplers due to severe FGR. Will need one more visit next week prior to IOL.     Delivery Planning  - Reviewed new severe FGR diagnosis and recommendation of delivery in the 37th week. IOL scheduled for  at 0730.   - Reviewed methods of IOL including cervical ripening and pitocin.   - Labor analgesia: reviewed pain medication options and patient believes she will opt for an epidural for active labor.   - Feeding: desires to breastfeed as able    35 minutes spent on the date of the encounter, doing chart review, history and exam, documentation and further activities as noted.      Leslie Keane CNM on 2022 at 12:39 PM

## 2022-07-14 ENCOUNTER — CARE COORDINATION (OUTPATIENT)
Dept: CARE COORDINATION | Facility: CLINIC | Age: 37
End: 2022-07-14

## 2022-07-14 LAB — GP B STREP DNA SPEC QL NAA+PROBE: NEGATIVE

## 2022-07-14 NOTE — PROGRESS NOTES
[facility]  MATERNAL CHILD HEALTH   [facility] NICU CONSULT     DATA:     SW participated in NICU consult at the [facility] Clinic on 22 with Neonatologist, Dr. Stephen.  Pt, Reanna, is 36 year old  currently 36w3d weeks pregnant with a girl . Intended name for baby is unknown at this time.   Prenatal diagnosis complicated by pregnancy c/b fetal TOF, 22q deletion syndrome and FGR .     TEAM INTERVENTION:       SW met with parents to review anticipated NICU admission and provided overview of NICU environment.     SW provided [facility] contact information.     SW answered family questions regarding parental leave.    MD answered family questions and provided further information about babies medical status and potential outcomes following birth and in the first few weeks of life.     SW provided validation of emotion, encouraged family to continue accessing their network and SW for support.    ASSESSMENT:     Level of engagement with medical team: Family appeared welcoming of the medical team and was engaged throughout, asking questions when appropriate. They appeared receptive to new information and were appreciative of the meeting.     Strengths: Strong social support, engagement with the medical team, kind and caring family, stability in housing/work/transportation, willingness to accept help, good self awareness, advocates for their baby    Identified Barriers: lodging- Family is living around 40 minutes away. Though they did not identify this as a barrier, at this time.     PLAN:     SW will continue to follow for throughout Maternal Child Health Journey.     JULIUS Rooney  Maternal Child Health   Phone: [phone]  Pager: [phone]  After hours pager: [phone]

## 2022-07-18 ENCOUNTER — HOSPITAL ENCOUNTER (OUTPATIENT)
Dept: ULTRASOUND IMAGING | Facility: CLINIC | Age: 37
Discharge: HOME | End: 2022-07-18
Attending: OBSTETRICS & GYNECOLOGY
Payer: COMMERCIAL

## 2022-07-18 ENCOUNTER — OFFICE VISIT (OUTPATIENT)
Dept: MATERNAL FETAL MEDICINE | Facility: CLINIC | Age: 37
End: 2022-07-18
Attending: ADVANCED PRACTICE MIDWIFE
Payer: COMMERCIAL

## 2022-07-18 VITALS
SYSTOLIC BLOOD PRESSURE: 120 MMHG | BODY MASS INDEX: 29.05 KG/M2 | DIASTOLIC BLOOD PRESSURE: 75 MMHG | HEART RATE: 85 BPM | RESPIRATION RATE: 18 BRPM | OXYGEN SATURATION: 98 % | WEIGHT: 166.6 LBS

## 2022-07-18 DIAGNOSIS — Z11.52 ENCOUNTER FOR PREPROCEDURE SCREENING LABORATORY TESTING FOR COVID-19: ICD-10-CM

## 2022-07-18 DIAGNOSIS — O35.BXX0 FETAL TETRALOGY OF FALLOT AFFECTING ANTEPARTUM CARE OF MOTHER, NOT APPLICABLE OR UNSPECIFIED FETUS: Primary | ICD-10-CM

## 2022-07-18 DIAGNOSIS — O35.BXX0 ANOMALY OF HEART OF FETUS AFFECTING PREGNANCY, ANTEPARTUM, SINGLE OR UNSPECIFIED FETUS: ICD-10-CM

## 2022-07-18 DIAGNOSIS — Z01.812 ENCOUNTER FOR PREPROCEDURE SCREENING LABORATORY TESTING FOR COVID-19: ICD-10-CM

## 2022-07-18 DIAGNOSIS — O35.BXX0 FETAL TETRALOGY OF FALLOT AFFECTING ANTEPARTUM CARE OF MOTHER, NOT APPLICABLE OR UNSPECIFIED FETUS: ICD-10-CM

## 2022-07-18 DIAGNOSIS — O35.BXX0 ANOMALY OF HEART OF FETUS AFFECTING PREGNANCY, ANTEPARTUM, SINGLE OR UNSPECIFIED FETUS: Primary | ICD-10-CM

## 2022-07-18 DIAGNOSIS — O36.5990 PREGNANCY AFFECTED BY FETAL GROWTH RESTRICTION: ICD-10-CM

## 2022-07-18 DIAGNOSIS — O35.19X0 22Q11.2 DELETION, FETAL, AFFECTING MATERNAL CARE: ICD-10-CM

## 2022-07-18 LAB — SARS-COV-2 RNA RESP QL NAA+PROBE: NEGATIVE

## 2022-07-18 PROCEDURE — 59025 FETAL NON-STRESS TEST: CPT

## 2022-07-18 PROCEDURE — G0463 HOSPITAL OUTPT CLINIC VISIT: HCPCS | Mod: 25

## 2022-07-18 PROCEDURE — U0003 INFECTIOUS AGENT DETECTION BY NUCLEIC ACID (DNA OR RNA); SEVERE ACUTE RESPIRATORY SYNDROME CORONAVIRUS 2 (SARS-COV-2) (CORONAVIRUS DISEASE [COVID-19]), AMPLIFIED PROBE TECHNIQUE, MAKING USE OF HIGH THROUGHPUT TECHNOLOGIES AS DESCRIBED BY CMS-2020-01-R: HCPCS | Performed by: ADVANCED PRACTICE MIDWIFE

## 2022-07-18 PROCEDURE — 76820 UMBILICAL ARTERY ECHO: CPT | Mod: 26 | Performed by: OBSTETRICS & GYNECOLOGY

## 2022-07-18 PROCEDURE — 99212 OFFICE O/P EST SF 10 MIN: CPT | Mod: 25 | Performed by: ADVANCED PRACTICE MIDWIFE

## 2022-07-18 PROCEDURE — 76820 UMBILICAL ARTERY ECHO: CPT

## 2022-07-18 PROCEDURE — 76815 OB US LIMITED FETUS(S): CPT | Mod: 26 | Performed by: OBSTETRICS & GYNECOLOGY

## 2022-07-18 PROCEDURE — 59025 FETAL NON-STRESS TEST: CPT | Mod: 26 | Performed by: OBSTETRICS & GYNECOLOGY

## 2022-07-18 ASSESSMENT — PAIN SCALES - GENERAL: PAINLEVEL: NO PAIN (0)

## 2022-07-18 NOTE — PROGRESS NOTES
"Maternal fetal Medicine OB Follow up visit.     Reanna Gilbert  : 1985  MRN: [id number]    CC: OB Follow-up    Subjective:  Reanna Gilbert is a 36 year old  at 37w0d presenting for routine OB follow-up. Today, she is feeling well. Getting ready for her upcoming induction.     Patient denies regular, painful contractions, denies loss of fluid or vaginal bleeding.  Reports fetal movement.        OB Hx:  OB History    Para Term  AB Living   1 0 0 0 0 0   SAB IAB Ectopic Multiple Live Births   0 0 0 0 0      # Outcome Date GA Lbr Sanjay/2nd Weight Sex Delivery Anes PTL Lv   1 Current                  Objective:  /75 (BP Location: Left arm, Patient Position: Chair)   Pulse 85   Resp 18   Wt 75.6 kg (166 lb 9.6 oz)   LMP 2021   SpO2 98%   BMI 29.05 kg/m    Gen: alert, oriented, NAD  Skin: warm, dry, intact  Respiratory: breathing unlabored, no SOB  Abdominal: gravid, non-tender  Pelvic: deferred  Extremities: WNL  Psych: mood WNL, behavior WNL      OB Ultrasound:  Please see \"imaging\" tab under chart review for today's ultrasound results.      Assessment/Plan:  36 year old  at 37w0d here for follow OB visit.    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  - Fetal echo on 4/15:Tetralogy of Fallot with pulmonary atresia. Large conoseptal ventricular septal defect. There is retrograde flow across a likely ductus arteriosus,  though the native branch pulmonary arteries are not well-seen. Leftard aortic arch. 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 Normal right and left ventricular size and systolic function. No " atrioventricular valve regurgitation, no evidence of diastolic dysfunction, no effusion.  - S/p NICU/SW and pediatric CV surgery visits on  and , respectively     Routine PNC  - Prenatal labs:               Rh: +  antibody: neg               HepB/HIV/RPR/HepC: nonreactive               GC/CT: negative               Rubella: immune                 GCT: normal  - Immunizations: s/p Covid; Tdap on 22  - GBS negative      Surveillance  - No further growth US indicated at this time. EFW 2%tile (2089g) AC 2%tile on .  - Increase to twice weekly limited US/NST/UAR dopplers due to severe FGR. Will need one more visit next week prior to IOL.     Delivery Planning  - In the setting of severe FGR, recommendation of delivery in the 37th week. IOL scheduled for  at 0730.   - Reviewed methods of IOL including cervical ripening, pitocin, and possible AROM.   - Labor analgesia: reviewed pain medication options and patient believes she will opt for an epidural for active labor.   - Feeding: desires to breastfeed as able  - Contraception: Has previously used OCPs. May return to this, but did review other options including LARCs.   - 6 week postpartum follow up can occur with MFM or with primary OB provider per patient preference. Reviewed that if LARC is desired, currently our office is not set up to place those and would recommend return to [facility].     15 minutes spent on the date of the encounter, doing chart review, history and exam, documentation and further activities as noted.      Leslie Keane CNM on 2022 at 9:05 AM

## 2022-07-18 NOTE — NURSING NOTE
Reanna seen in clinic today for NST/UAR/prenatal visit at 37w0d gestation due to pregnancy c/b FGR, TOF, 22q deletion syndrome (see report/notes). VSS. Pt reports + FM. Pt denies bldg/lof/change in discharge/contractions/headache/vision changes/chest pain/SOB/edema. Dr. Benavides reviewed NST and ultrasound. Leslie Keane CNM met with pt and discussed POC. Plan to for IOL on 7/20 at 0730am. Covid swab collected today. Pt given instructions for IOL, call 30-60 min before arrival. Pt discharged stable and ambulatory.     Martha Parsons RN

## 2022-07-18 NOTE — PROGRESS NOTES
NICU Consult    I had the pleasure of meeting Ms. Reanna Gilbert and her partner Rigo in the MFM clinic at [facility] for NICU consultation in conjunction with our maternal child health social work team.  Ms. Gilbert is currently 36 weeks pregnant with a female fetus.  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  Genetic testing is notable for a 22q deletion consistent with DiGeorge syndrome.    We initially discussed the NICU resuscitation team that will be present at delivery.  I described the various cardiopulmonary supports that could be needed in the delivery room as well as the admission process to the NICU.  Shortly after admission, central umbilical vein and artery cannulas will be placed and early cardiac ECHO will be obtained.  We discussed the collaboration with pediatric cardiology and the pediatric cardiothoracic surgery team.  There will be screening studies completed including head and renal imaging after obtaining results of the heart ECHO.  Ongoing surgical planning and the need for prostaglandin therapy will be evaluated pending initial course and the first ECHO.  Given the prenatal diagnosis of 22q deletion, we will involve our colleagues from genetics and complete the usual screening for hypocalcemia and immune function.      I described the basic layout of the NICU and invited parents to be present for rounds daily.  We also discussed the layers of support in the NICU including maternal child health social work, OT, and lactation resources.  We briefly discussed the transition plans to the CVICU pending cardiac status.  Finally, we reviewed the COVID visiting restrictions and ability to utilize [employer].    Please feel free to contact me if there are any other questions prior to delivery.  We look forward to caring for the  infant of Ms. Reanna Gilbert in the NICU at [facility].    Josy Stephen MD  Neonatology    Total time of visit: 30 minutes with 100% of time in direct counseling.

## 2022-07-18 NOTE — PROGRESS NOTES
"Please see \"Imaging\" tab under \"Chart Review\" for details of today's US at the [facility].    Flex Benavides MD  Maternal-Fetal Medicine      "

## 2022-07-20 ENCOUNTER — ANESTHESIA EVENT (OUTPATIENT)
Dept: OBGYN | Facility: CLINIC | Age: 37
End: 2022-07-20
Payer: COMMERCIAL

## 2022-07-20 ENCOUNTER — HOSPITAL ENCOUNTER (INPATIENT)
Facility: CLINIC | Age: 37
LOS: 5 days | Discharge: HOME HEALTH | End: 2022-07-25
Attending: OBSTETRICS & GYNECOLOGY | Admitting: OBSTETRICS & GYNECOLOGY
Payer: COMMERCIAL

## 2022-07-20 ENCOUNTER — ANESTHESIA (OUTPATIENT)
Dept: OBGYN | Facility: CLINIC | Age: 37
End: 2022-07-20
Payer: COMMERCIAL

## 2022-07-20 DIAGNOSIS — Z98.891 S/P CESAREAN SECTION: Primary | ICD-10-CM

## 2022-07-20 LAB
ABO/RH(D): NORMAL
ANTIBODY SCREEN: NEGATIVE
ERYTHROCYTE [DISTWIDTH] IN BLOOD BY AUTOMATED COUNT: 13.3 % (ref 10–15)
HCT VFR BLD AUTO: 35.7 % (ref 35–47)
HGB BLD-MCNC: 12 G/DL (ref 11.7–15.7)
MCH RBC QN AUTO: 31.7 PG (ref 26.5–33)
MCHC RBC AUTO-ENTMCNC: 33.6 G/DL (ref 31.5–36.5)
MCV RBC AUTO: 94 FL (ref 78–100)
PLATELET # BLD AUTO: 148 10E3/UL (ref 150–450)
RBC # BLD AUTO: 3.78 10E6/UL (ref 3.8–5.2)
SPECIMEN EXPIRATION DATE: NORMAL
T PALLIDUM AB SER QL: NONREACTIVE
WBC # BLD AUTO: 7.1 10E3/UL (ref 4–11)

## 2022-07-20 PROCEDURE — 85027 COMPLETE CBC AUTOMATED: CPT | Performed by: STUDENT IN AN ORGANIZED HEALTH CARE EDUCATION/TRAINING PROGRAM

## 2022-07-20 PROCEDURE — 86780 TREPONEMA PALLIDUM: CPT | Performed by: STUDENT IN AN ORGANIZED HEALTH CARE EDUCATION/TRAINING PROGRAM

## 2022-07-20 PROCEDURE — 86901 BLOOD TYPING SEROLOGIC RH(D): CPT | Performed by: STUDENT IN AN ORGANIZED HEALTH CARE EDUCATION/TRAINING PROGRAM

## 2022-07-20 PROCEDURE — 250N000013 HC RX MED GY IP 250 OP 250 PS 637: Performed by: STUDENT IN AN ORGANIZED HEALTH CARE EDUCATION/TRAINING PROGRAM

## 2022-07-20 PROCEDURE — 3E0P7VZ INTRODUCTION OF HORMONE INTO FEMALE REPRODUCTIVE, VIA NATURAL OR ARTIFICIAL OPENING: ICD-10-PCS | Performed by: OBSTETRICS & GYNECOLOGY

## 2022-07-20 PROCEDURE — 258N000003 HC RX IP 258 OP 636: Performed by: STUDENT IN AN ORGANIZED HEALTH CARE EDUCATION/TRAINING PROGRAM

## 2022-07-20 PROCEDURE — 120N000002 HC R&B MED SURG/OB UMMC

## 2022-07-20 PROCEDURE — 36415 COLL VENOUS BLD VENIPUNCTURE: CPT | Performed by: STUDENT IN AN ORGANIZED HEALTH CARE EDUCATION/TRAINING PROGRAM

## 2022-07-20 RX ORDER — NIFEDIPINE 10 MG/1
10 CAPSULE ORAL
Status: DISCONTINUED | OUTPATIENT
Start: 2022-07-20 | End: 2022-07-20

## 2022-07-20 RX ORDER — NALBUPHINE HYDROCHLORIDE 10 MG/ML
2.5-5 INJECTION, SOLUTION INTRAMUSCULAR; INTRAVENOUS; SUBCUTANEOUS EVERY 6 HOURS PRN
Status: DISCONTINUED | OUTPATIENT
Start: 2022-07-20 | End: 2022-07-22

## 2022-07-20 RX ORDER — METHYLERGONOVINE MALEATE 0.2 MG/ML
200 INJECTION INTRAVENOUS
Status: DISCONTINUED | OUTPATIENT
Start: 2022-07-20 | End: 2022-07-22 | Stop reason: HOSPADM

## 2022-07-20 RX ORDER — KETOROLAC TROMETHAMINE 30 MG/ML
30 INJECTION, SOLUTION INTRAMUSCULAR; INTRAVENOUS
Status: DISCONTINUED | OUTPATIENT
Start: 2022-07-20 | End: 2022-07-22

## 2022-07-20 RX ORDER — OXYTOCIN 10 [USP'U]/ML
10 INJECTION, SOLUTION INTRAMUSCULAR; INTRAVENOUS
Status: DISCONTINUED | OUTPATIENT
Start: 2022-07-20 | End: 2022-07-22 | Stop reason: HOSPADM

## 2022-07-20 RX ORDER — IBUPROFEN 800 MG/1
800 TABLET, FILM COATED ORAL
Status: DISCONTINUED | OUTPATIENT
Start: 2022-07-20 | End: 2022-07-22

## 2022-07-20 RX ORDER — MORPHINE SULFATE 10 MG/ML
10 INJECTION, SOLUTION INTRAMUSCULAR; INTRAVENOUS
Status: DISCONTINUED | OUTPATIENT
Start: 2022-07-20 | End: 2022-07-22 | Stop reason: HOSPADM

## 2022-07-20 RX ORDER — ACETAMINOPHEN 325 MG/1
650 TABLET ORAL EVERY 4 HOURS PRN
Status: DISCONTINUED | OUTPATIENT
Start: 2022-07-20 | End: 2022-07-22 | Stop reason: HOSPADM

## 2022-07-20 RX ORDER — FENTANYL CITRATE-0.9 % NACL/PF 10 MCG/ML
100 PLASTIC BAG, INJECTION (ML) INTRAVENOUS EVERY 5 MIN PRN
Status: DISCONTINUED | OUTPATIENT
Start: 2022-07-20 | End: 2022-07-22 | Stop reason: HOSPADM

## 2022-07-20 RX ORDER — MAGNESIUM SULFATE IN WATER 40 MG/ML
2 INJECTION, SOLUTION INTRAVENOUS CONTINUOUS
Status: DISCONTINUED | OUTPATIENT
Start: 2022-07-20 | End: 2022-07-20

## 2022-07-20 RX ORDER — NALOXONE HYDROCHLORIDE 0.4 MG/ML
0.2 INJECTION, SOLUTION INTRAMUSCULAR; INTRAVENOUS; SUBCUTANEOUS
Status: DISCONTINUED | OUTPATIENT
Start: 2022-07-20 | End: 2022-07-22 | Stop reason: HOSPADM

## 2022-07-20 RX ORDER — NALOXONE HYDROCHLORIDE 0.4 MG/ML
0.4 INJECTION, SOLUTION INTRAMUSCULAR; INTRAVENOUS; SUBCUTANEOUS
Status: DISCONTINUED | OUTPATIENT
Start: 2022-07-20 | End: 2022-07-22 | Stop reason: HOSPADM

## 2022-07-20 RX ORDER — ONDANSETRON 2 MG/ML
4 INJECTION INTRAMUSCULAR; INTRAVENOUS EVERY 6 HOURS PRN
Status: DISCONTINUED | OUTPATIENT
Start: 2022-07-20 | End: 2022-07-22 | Stop reason: HOSPADM

## 2022-07-20 RX ORDER — MISOPROSTOL 100 UG/1
25 TABLET ORAL EVERY 4 HOURS PRN
Status: DISCONTINUED | OUTPATIENT
Start: 2022-07-20 | End: 2022-07-21

## 2022-07-20 RX ORDER — METOCLOPRAMIDE HYDROCHLORIDE 5 MG/ML
10 INJECTION INTRAMUSCULAR; INTRAVENOUS EVERY 6 HOURS PRN
Status: DISCONTINUED | OUTPATIENT
Start: 2022-07-20 | End: 2022-07-22 | Stop reason: HOSPADM

## 2022-07-20 RX ORDER — SUMATRIPTAN 50 MG/1
50 TABLET, FILM COATED ORAL
Status: DISCONTINUED | OUTPATIENT
Start: 2022-07-20 | End: 2022-07-20

## 2022-07-20 RX ORDER — OXYTOCIN/0.9 % SODIUM CHLORIDE 30/500 ML
340 PLASTIC BAG, INJECTION (ML) INTRAVENOUS CONTINUOUS PRN
Status: DISCONTINUED | OUTPATIENT
Start: 2022-07-20 | End: 2022-07-22 | Stop reason: HOSPADM

## 2022-07-20 RX ORDER — TRANEXAMIC ACID 10 MG/ML
1 INJECTION, SOLUTION INTRAVENOUS EVERY 30 MIN PRN
Status: DISCONTINUED | OUTPATIENT
Start: 2022-07-20 | End: 2022-07-22 | Stop reason: HOSPADM

## 2022-07-20 RX ORDER — CALCIUM GLUCONATE 94 MG/ML
1 INJECTION, SOLUTION INTRAVENOUS
Status: DISCONTINUED | OUTPATIENT
Start: 2022-07-20 | End: 2022-07-20

## 2022-07-20 RX ORDER — MISOPROSTOL 200 UG/1
800 TABLET ORAL
Status: DISCONTINUED | OUTPATIENT
Start: 2022-07-20 | End: 2022-07-22 | Stop reason: HOSPADM

## 2022-07-20 RX ORDER — CITRIC ACID/SODIUM CITRATE 334-500MG
30 SOLUTION, ORAL ORAL ONCE
Status: DISCONTINUED | OUTPATIENT
Start: 2022-07-20 | End: 2022-07-22 | Stop reason: HOSPADM

## 2022-07-20 RX ORDER — HYDROXYZINE HYDROCHLORIDE 50 MG/1
50 TABLET, FILM COATED ORAL
Status: DISCONTINUED | OUTPATIENT
Start: 2022-07-20 | End: 2022-07-22 | Stop reason: HOSPADM

## 2022-07-20 RX ORDER — PROCHLORPERAZINE 25 MG
25 SUPPOSITORY, RECTAL RECTAL EVERY 12 HOURS PRN
Status: DISCONTINUED | OUTPATIENT
Start: 2022-07-20 | End: 2022-07-22 | Stop reason: HOSPADM

## 2022-07-20 RX ORDER — METOCLOPRAMIDE 10 MG/1
10 TABLET ORAL EVERY 6 HOURS PRN
Status: DISCONTINUED | OUTPATIENT
Start: 2022-07-20 | End: 2022-07-22 | Stop reason: HOSPADM

## 2022-07-20 RX ORDER — FENTANYL CITRATE 50 UG/ML
100 INJECTION, SOLUTION INTRAMUSCULAR; INTRAVENOUS
Status: DISCONTINUED | OUTPATIENT
Start: 2022-07-20 | End: 2022-07-22 | Stop reason: HOSPADM

## 2022-07-20 RX ORDER — MISOPROSTOL 200 UG/1
400 TABLET ORAL
Status: DISCONTINUED | OUTPATIENT
Start: 2022-07-20 | End: 2022-07-22 | Stop reason: HOSPADM

## 2022-07-20 RX ORDER — ONDANSETRON 4 MG/1
4 TABLET, ORALLY DISINTEGRATING ORAL EVERY 6 HOURS PRN
Status: DISCONTINUED | OUTPATIENT
Start: 2022-07-20 | End: 2022-07-22 | Stop reason: HOSPADM

## 2022-07-20 RX ORDER — PROCHLORPERAZINE MALEATE 10 MG
10 TABLET ORAL EVERY 6 HOURS PRN
Status: DISCONTINUED | OUTPATIENT
Start: 2022-07-20 | End: 2022-07-22 | Stop reason: HOSPADM

## 2022-07-20 RX ORDER — MAGNESIUM SULFATE HEPTAHYDRATE 40 MG/ML
4 INJECTION, SOLUTION INTRAVENOUS ONCE
Status: DISCONTINUED | OUTPATIENT
Start: 2022-07-20 | End: 2022-07-20

## 2022-07-20 RX ORDER — CITRIC ACID/SODIUM CITRATE 334-500MG
30 SOLUTION, ORAL ORAL
Status: DISCONTINUED | OUTPATIENT
Start: 2022-07-20 | End: 2022-07-22 | Stop reason: HOSPADM

## 2022-07-20 RX ORDER — FENTANYL/ROPIVACAINE/NS/PF 2MCG/ML-.1
PLASTIC BAG, INJECTION (ML) EPIDURAL
Status: DISCONTINUED | OUTPATIENT
Start: 2022-07-20 | End: 2022-07-22 | Stop reason: HOSPADM

## 2022-07-20 RX ORDER — SODIUM CHLORIDE, SODIUM LACTATE, POTASSIUM CHLORIDE, CALCIUM CHLORIDE 600; 310; 30; 20 MG/100ML; MG/100ML; MG/100ML; MG/100ML
INJECTION, SOLUTION INTRAVENOUS CONTINUOUS
Status: DISCONTINUED | OUTPATIENT
Start: 2022-07-20 | End: 2022-07-22 | Stop reason: HOSPADM

## 2022-07-20 RX ORDER — OXYTOCIN/0.9 % SODIUM CHLORIDE 30/500 ML
100-340 PLASTIC BAG, INJECTION (ML) INTRAVENOUS CONTINUOUS PRN
Status: DISCONTINUED | OUTPATIENT
Start: 2022-07-20 | End: 2022-07-22

## 2022-07-20 RX ORDER — CARBOPROST TROMETHAMINE 250 UG/ML
250 INJECTION, SOLUTION INTRAMUSCULAR
Status: DISCONTINUED | OUTPATIENT
Start: 2022-07-20 | End: 2022-07-22 | Stop reason: HOSPADM

## 2022-07-20 RX ORDER — OXYTOCIN 10 [USP'U]/ML
10 INJECTION, SOLUTION INTRAMUSCULAR; INTRAVENOUS
Status: DISCONTINUED | OUTPATIENT
Start: 2022-07-20 | End: 2022-07-22

## 2022-07-20 RX ADMIN — SODIUM CHLORIDE, POTASSIUM CHLORIDE, SODIUM LACTATE AND CALCIUM CHLORIDE: 600; 310; 30; 20 INJECTION, SOLUTION INTRAVENOUS at 20:18

## 2022-07-20 RX ADMIN — SODIUM CHLORIDE, POTASSIUM CHLORIDE, SODIUM LACTATE AND CALCIUM CHLORIDE: 600; 310; 30; 20 INJECTION, SOLUTION INTRAVENOUS at 11:32

## 2022-07-20 RX ADMIN — SODIUM CHLORIDE, POTASSIUM CHLORIDE, SODIUM LACTATE AND CALCIUM CHLORIDE 500 ML: 600; 310; 30; 20 INJECTION, SOLUTION INTRAVENOUS at 13:36

## 2022-07-20 RX ADMIN — MISOPROSTOL 25 MCG: 100 TABLET ORAL at 14:52

## 2022-07-20 RX ADMIN — MISOPROSTOL 25 MCG: 100 TABLET ORAL at 09:40

## 2022-07-20 ASSESSMENT — ACTIVITIES OF DAILY LIVING (ADL)
CHANGE_IN_FUNCTIONAL_STATUS_SINCE_ONSET_OF_CURRENT_ILLNESS/INJURY: NO
WALKING_OR_CLIMBING_STAIRS_DIFFICULTY: NO
DIFFICULTY_COMMUNICATING: NO
ADLS_ACUITY_SCORE: 18
DIFFICULTY_EATING/SWALLOWING: NO
ADLS_ACUITY_SCORE: 18
DRESSING/BATHING_DIFFICULTY: NO
ADLS_ACUITY_SCORE: 18
FALL_HISTORY_WITHIN_LAST_SIX_MONTHS: NO
ADLS_ACUITY_SCORE: 18
TOILETING_ISSUES: NO
HEARING_DIFFICULTY_OR_DEAF: NO
DOING_ERRANDS_INDEPENDENTLY_DIFFICULTY: NO
ADLS_ACUITY_SCORE: 18
CONCENTRATING,_REMEMBERING_OR_MAKING_DECISIONS_DIFFICULTY: NO
WEAR_GLASSES_OR_BLIND: NO
ADLS_ACUITY_SCORE: 18

## 2022-07-20 NOTE — PROGRESS NOTES
[facility]  FHT Strip Review Note    Care plan discussed with patient's RN. Decel noted when patient laying flat on back. Repositioned, now Category I FHT.     O:   Patient Vitals for the past 4 hrs:   BP Resp   22 0945 108/65 16       FHT: Baseline 150bpm, mod variability, pos accelerations, late deceration at 1330PM when patient supine. Repositioned, now Cat I.   [place]: 2-3 contractions in 10 minutes    A/P:  Reanna Gilbert is a 36 year old  at 37w2d by LMP c/w 11w1d US, here for induction of labor for Tetrology of Fallot and 22q microdeletion syndrome. Pregnancy is otherwise notable AMA status, FGR and mild polyhydramnios.     Induction of Labor   - C/L/H at presentation > s/p PV misoprostol #1. To get second dose in 30min after category I FHT persistent.   - Pain: Discussed options. Desires eventual epidural.   - GBS negative.   - All uterotonic medications appropriate.      Tetrology of Fallot (ToF)  Pulmonary Atresia   22q11 Microdeletion Syndrome   Fetal Growth Restriction   - s/p Amnio 3/14 confirming genetic finding. FGR diagnosed at 27w gestation, likely in the context of known genetic syndrome.   - Fetal ECHO last performed 4/15/22  - Comprehensive US (22): EFW 2%, AC 2%.    - Plan NICU at delivery, CT Surgery and Peds Cards awareness.      Routine Prenatal Care   - Rh positive - antibody negative, Rubella immune, GCT passed, GBS negative                 Fetal Well-Being    Cat II tracing, reactive; cephalic by BSUS; EFW 6lb    Charlotte Roper MD  OB/GYN PGY-3  22 1:33 PM

## 2022-07-20 NOTE — PLAN OF CARE
Data: Patient admitted to room 442 at 0731. Patient is a . Prenatal record reviewed.   OB History    Para Term  AB Living   1 0 0 0 0 0   SAB IAB Ectopic Multiple Live Births   0 0 0 0 0      # Outcome Date GA Lbr Sanjay/2nd Weight Sex Delivery Anes PTL Lv   1 Current            .  Medical History:   Past Medical History:   Diagnosis Date    Abnormal Pap smear of cervix     3 years ago; normal since    Known health problems: none     Varicose veins of lower extremity     left knee   .  Gestational age 37w2d. Vital signs per doc flowsheet. Fetal movement present. Patient reports she is here for IOL as reason for admission. Support persons Rigo (partner) present.  Action: Care of patient assumed at 0731. Verbal consent for EFM, external fetal monitors applied. Admission assessment completed. Patient and support persons educated on labor process. Patient instructed to report change in fetal movement, contractions, vaginal leaking of fluid or bleeding, abdominal pain, or any concerns related to the pregnancy to her nurse/physician. Patient oriented to room, call light in reach.   Response: Dr. Roper and Dr. Rankin informed of patient arrival. Will await plan of care for IOL from MD team.

## 2022-07-20 NOTE — PLAN OF CARE
Patient had a variable decel (possible late) which lasted 1 1/2 minutes.  Dr. Roper notified of decel.  Fluid bolus given and RN at bedside.  Patient to not have 2nd dose of misoprostol until Fetal heart tones are category 1 x 30 minutes per Dr. Roper.

## 2022-07-20 NOTE — H&P
History and Physical     Reanna Gilbert MRN# [id number]   YOB: 1985 Age: 36 year old      Date of Admission: 2022    Primary care provider: Tato Patino      Assessment and Plan:     Reanna Gilbert is a 36 year old  at 37w2d by LMP c/w 11w1d US, here for induction of labor for Tetrology of Fallot and 22q microdeletion syndrome. Pregnancy is otherwise notable AMA status, FGR and mild polyhydramnios.    Induction of Labor   - Based on unfavorable Mejia score, to begin with PV misoprostol. Eventual Presley intrauterine balloon. Augmentation with AROM, IV Pitocin prn.   - Discussed Category I FHT at this time (barring decel while checking SVE). Patient aware that Category II FHT remote from delivery would warrant  section, comfortable with this if clinically indicated.   - Pain: Discussed options. Desires eventual epidural.   - GBS negative.   - All uterotonic medications appropriate.     Tetrology of Fallot (ToF)  Pulmonary Atresia   22q11 Microdeletion Syndrome   Fetal Growth Restriction   - s/p Amnio 3/14 confirming genetic finding. FGR diagnosed at 27w gestation, likely in the context of known genetic syndrome.   - Fetal ECHO last performed 4/15/22: ToF with pulmonary atresia. Large conoseptal VSD. Retrograde flow across ductus arteriosis. Leftward aortic arch. 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   - Comprehensive US (22): EFW 2%, AC 2%. UAD resistance nl. DANIA with mild polyhydramnios (25.9cm), MVP 8.1cm.   - Previously met with Genetic Counselor, Neonatology (NICU consult ),  SW (), Pediatric Cardiology (Dr. Narayan) and CT Surgery (Dr. Cantu ). Offered NICU reconsultation at this time, patient does not have any additional questions. Plan for  evaluation for prostaglandin therapy, fetal ECHO.   - Plan NICU at delivery, CT Surgery and Peds Cards awareness.     Routine Prenatal Care   - Rh  positive - antibody negative, Rubella immune, GCT passed, GBS negative  - Other prenatal labs wnl  - s/p flu, Tdap vaccines      Fetal Well-Being    Cat I tracing, reactive; cephalic by BSUS; EFW 6lb  - Continuous Fetal Monitoring  - NICU for delivery   - Intrauterine resuscitative measures carlos alberton      Charlotte Roper MD   OB/GYN PGY-3  22 8:27 AM    I have seen and examined the patient without the resident. I have reviewed, edited, and agree with the note.   My findings are:since admission two spontaneous/late decelerations  Briefly reviewed indications for primary  with patient and her partner.     Alyssa Rankin MD              HPI:     Reanna Gilbert is a 36 year old  at 37w2d by LMP c/w 11w1d US who presents today for induction of labor in the setting of fetal Tetrology of Fallot and 22q11 microdeletion.    The patient presents in good health. She reports good fetal movement. Denies LOF, vaginal bleeding, or contractions.  She denies fever, chills, SOB, chest pain, palpitations, N/V, LE swelling/tenderness.  No concerns for headache, vision changes, RUQ or epigastric pain      Pregnancy notable for:   - Fetal Tetrology of Fallot   - Fetal 22q11 microdeletion   - FGR   -   OB History:    OB History    Para Term  AB Living   1 0 0 0 0 0   SAB IAB Ectopic Multiple Live Births   0 0 0 0 0      # Outcome Date GA Lbr Sanjay/2nd Weight Sex Delivery Anes PTL Lv   1 Current                 Prenatal Lab Results:  Lab Results   Component Value Date    AS Negative 2022    HGB 12.0 2022       GBS Status:   No results found for: GBS             Past Medical History:     Past Medical History:   Diagnosis Date     Abnormal Pap smear of cervix     3 years ago; normal since     Known health problems: none      Varicose veins of lower extremity     left knee             Past Surgical History:     Past Surgical History:   Procedure Laterality Date     ARTHROSCOPIC REPAIR ACL        wisdom teeth  2004             Social History:     Social History     Tobacco Use     Smoking status: Never Smoker     Smokeless tobacco: Never Used   Substance Use Topics     Alcohol use: Yes     Comment: socially             Family History:     Family History   Problem Relation Age of Onset     Allergies Mother      Allergies Sister      Urticaria Sister      Allergies Sister              Immunizations:     Immunization History   Administered Date(s) Administered     COVID-19,PF,Pfizer (12+ Yrs) 12/22/2020, 02/02/2021     DTaP, Unspecified 08/03/2009     FLU 6-35 months 10/16/2013, 10/05/2016     HPV Quadrivalent 11/23/2007, 03/12/2008, 10/15/2008     HepB, Unspecified 09/08/1999, 10/14/1999, 04/24/2000, 08/30/2011     Influenza (IIV3) PF 11/23/2007, 10/06/2015     Influenza Vaccine IM > 6 months Valent IIV4 (Alfuria,Fluzone) 10/02/2017, 10/16/2019, 09/21/2021     MMR 11/02/1988, 09/08/1999     Meningococcal (Menactra ) 04/19/2006     Meningococcal (Menomune ) 04/19/2006     Tdap (Adacel,Boostrix) 08/03/2009     Tdap (Adult) Unspecified Formulation 05/19/2022            Allergies:   No Known Allergies          Medications:     Medications Prior to Admission   Medication Sig Dispense Refill Last Dose     aspirin (ASA) 325 MG EC tablet Take 1 tablet (325 mg) by mouth daily 100 tablet 3      azelastine (ASTELIN) 0.1 % nasal spray Spray 2 sprays into both nostrils 2 times daily as needed for rhinitis 30 mL 3      cetirizine (ZYRTEC) 10 MG tablet Take 1 tablet by mouth daily        EPINEPHrine (ANY BX GENERIC EQUIV) 0.3 MG/0.3ML injection 2-pack Inject 0.3 mLs (0.3 mg) into the muscle as needed for anaphylaxis (Patient not taking: Reported on 4/28/2020) 0.6 mL 3      fexofenadine (ALLEGRA) 180 MG tablet Take 180 mg by mouth daily         fluticasone (FLONASE) 50 MCG/ACT nasal spray Spray 2 sprays into both nostrils daily 16 g 3      ORDER FOR ALLERGEN IMMUNOTHERAPY Name of Mix: Mix #1  Tree , Weeds  Birch Mix PRW  1:20 w/v, HS  0.5 ml  Boxelder-Maple Mix BHR (Boxelder Hard Red) 1:20 w/v, HS  0.5 ml  Lovell, Common 1:20 w/v, HS  0.5 ml  Pine, White 1:20 w/v, ALK 0.5 ml  Kochia 1:20 w/v, HS 0.5 ml  Plantain, English 1:20 w/v, HS 0.5 ml  Ragweed Mixed 1:20 w/v ALK  0.5 ml  Diluent: HSA qs to 5ml 5 mL PRN      ORDER FOR ALLERGEN IMMUNOTHERAPY Name of Mix: Mix #2  Dust Mite, Cat, Dog, Grass  Cat Hair, Standardized 10,000 BAU/mL, ALK  2.0 ml  Dog Hair-Dander, A. P.  1:100 w/v, HS  1.0 ml  Dust Mites DP. 10,000 AU/mL, HS  0.5 ml   Adis Grass (Std) 100,000 BAU/mL, HS 0.3 ml  Diluent: HSA qs to 5ml 5 mL PRN      ORDER FOR ALLERGEN IMMUNOTHERAPY Name of Mix: Mix #3  Mold  Epicoccum Nigrum 1:10 w/v, HS 0.5 ml  Diluent: HSA qs to 5ml 5 mL PRN      Prenatal Vit-Fe Fumarate-FA (PRENATAL VITAMIN PO) Take 1 tablet by mouth daily        SUMAtriptan (IMITREX) 50 MG tablet Take 1 tablet (50 mg) by mouth at onset of headache for migraine 10 tablet 3              Review of Systems & Physical Exam:     The Review of Systems is negative other than noted in the HPI      /68 (BP Location: Left arm, Patient Position: Semi-Jean's)   Pulse 95   Temp 99.5  F (37.5  C) (Axillary)   Resp 16   LMP 11/01/2021   Gen: Well appearing, no distress   CV: RRR, nl S1/S2, no murmurs/clicks/gallops  Lungs: CTAB, non-labored breathing  Abd: Gravid, non-tender, non-distended  Ext: Trace peripheral extremity edema    Cervix: C/L/H  Membranes: intact   Presentation: cephalic by BSUS.  Estimated Fetal Weight: 6lb    FHT:  Monitoring External  FHT: Baseline 155 bpm; moderate variability; positive accels present; no decelerations  TOCO: No contractions         Data:     Recent Results (from the past 48 hour(s))   CBC with platelets    Collection Time: 07/20/22  8:16 AM   Result Value Ref Range    WBC Count 7.1 4.0 - 11.0 10e3/uL    RBC Count 3.78 (L) 3.80 - 5.20 10e6/uL    Hemoglobin 12.0 11.7 - 15.7 g/dL    Hematocrit 35.7 35.0 - 47.0 %    MCV 94 78 -  100 fL    MCH 31.7 26.5 - 33.0 pg    MCHC 33.6 31.5 - 36.5 g/dL    RDW 13.3 10.0 - 15.0 %    Platelet Count 148 (L) 150 - 450 10e3/uL   Adult Type and Screen    Collection Time: 07/20/22  8:16 AM   Result Value Ref Range    ABO/RH(D) O POS     Antibody Screen Negative Negative    SPECIMEN EXPIRATION DATE [id number]

## 2022-07-21 PROCEDURE — 250N000013 HC RX MED GY IP 250 OP 250 PS 637: Performed by: STUDENT IN AN ORGANIZED HEALTH CARE EDUCATION/TRAINING PROGRAM

## 2022-07-21 PROCEDURE — 250N000011 HC RX IP 250 OP 636: Performed by: STUDENT IN AN ORGANIZED HEALTH CARE EDUCATION/TRAINING PROGRAM

## 2022-07-21 PROCEDURE — 120N000002 HC R&B MED SURG/OB UMMC

## 2022-07-21 PROCEDURE — 258N000003 HC RX IP 258 OP 636: Performed by: STUDENT IN AN ORGANIZED HEALTH CARE EDUCATION/TRAINING PROGRAM

## 2022-07-21 PROCEDURE — 10907ZC DRAINAGE OF AMNIOTIC FLUID, THERAPEUTIC FROM PRODUCTS OF CONCEPTION, VIA NATURAL OR ARTIFICIAL OPENING: ICD-10-PCS | Performed by: OBSTETRICS & GYNECOLOGY

## 2022-07-21 PROCEDURE — 3E0R3BZ INTRODUCTION OF ANESTHETIC AGENT INTO SPINAL CANAL, PERCUTANEOUS APPROACH: ICD-10-PCS | Performed by: STUDENT IN AN ORGANIZED HEALTH CARE EDUCATION/TRAINING PROGRAM

## 2022-07-21 PROCEDURE — 250N000009 HC RX 250: Performed by: STUDENT IN AN ORGANIZED HEALTH CARE EDUCATION/TRAINING PROGRAM

## 2022-07-21 PROCEDURE — 00HU33Z INSERTION OF INFUSION DEVICE INTO SPINAL CANAL, PERCUTANEOUS APPROACH: ICD-10-PCS | Performed by: STUDENT IN AN ORGANIZED HEALTH CARE EDUCATION/TRAINING PROGRAM

## 2022-07-21 RX ORDER — TRANEXAMIC ACID 10 MG/ML
1 INJECTION, SOLUTION INTRAVENOUS EVERY 30 MIN PRN
Status: DISCONTINUED | OUTPATIENT
Start: 2022-07-21 | End: 2022-07-22 | Stop reason: HOSPADM

## 2022-07-21 RX ORDER — CEFAZOLIN SODIUM/WATER 2 G/20 ML
2 SYRINGE (ML) INTRAVENOUS SEE ADMIN INSTRUCTIONS
Status: DISCONTINUED | OUTPATIENT
Start: 2022-07-21 | End: 2022-07-22 | Stop reason: HOSPADM

## 2022-07-21 RX ORDER — OXYTOCIN/0.9 % SODIUM CHLORIDE 30/500 ML
340 PLASTIC BAG, INJECTION (ML) INTRAVENOUS CONTINUOUS PRN
Status: DISCONTINUED | OUTPATIENT
Start: 2022-07-21 | End: 2022-07-22 | Stop reason: HOSPADM

## 2022-07-21 RX ORDER — OXYTOCIN/0.9 % SODIUM CHLORIDE 30/500 ML
1-24 PLASTIC BAG, INJECTION (ML) INTRAVENOUS CONTINUOUS
Status: DISCONTINUED | OUTPATIENT
Start: 2022-07-21 | End: 2022-07-22 | Stop reason: HOSPADM

## 2022-07-21 RX ORDER — SODIUM CHLORIDE, SODIUM LACTATE, POTASSIUM CHLORIDE, CALCIUM CHLORIDE 600; 310; 30; 20 MG/100ML; MG/100ML; MG/100ML; MG/100ML
INJECTION, SOLUTION INTRAVENOUS CONTINUOUS PRN
Status: DISCONTINUED | OUTPATIENT
Start: 2022-07-21 | End: 2022-07-22 | Stop reason: HOSPADM

## 2022-07-21 RX ORDER — MISOPROSTOL 200 UG/1
800 TABLET ORAL
Status: DISCONTINUED | OUTPATIENT
Start: 2022-07-21 | End: 2022-07-22 | Stop reason: HOSPADM

## 2022-07-21 RX ORDER — CARBOPROST TROMETHAMINE 250 UG/ML
250 INJECTION, SOLUTION INTRAMUSCULAR
Status: DISCONTINUED | OUTPATIENT
Start: 2022-07-21 | End: 2022-07-22 | Stop reason: HOSPADM

## 2022-07-21 RX ORDER — MISOPROSTOL 200 UG/1
400 TABLET ORAL
Status: DISCONTINUED | OUTPATIENT
Start: 2022-07-21 | End: 2022-07-22 | Stop reason: HOSPADM

## 2022-07-21 RX ORDER — CITRIC ACID/SODIUM CITRATE 334-500MG
30 SOLUTION, ORAL ORAL
Status: COMPLETED | OUTPATIENT
Start: 2022-07-21 | End: 2022-07-22

## 2022-07-21 RX ORDER — LIDOCAINE 40 MG/G
CREAM TOPICAL
Status: DISCONTINUED | OUTPATIENT
Start: 2022-07-21 | End: 2022-07-22 | Stop reason: HOSPADM

## 2022-07-21 RX ORDER — SODIUM CHLORIDE, SODIUM LACTATE, POTASSIUM CHLORIDE, CALCIUM CHLORIDE 600; 310; 30; 20 MG/100ML; MG/100ML; MG/100ML; MG/100ML
INJECTION, SOLUTION INTRAVENOUS CONTINUOUS
Status: DISCONTINUED | OUTPATIENT
Start: 2022-07-21 | End: 2022-07-22 | Stop reason: HOSPADM

## 2022-07-21 RX ORDER — OXYTOCIN 10 [USP'U]/ML
10 INJECTION, SOLUTION INTRAMUSCULAR; INTRAVENOUS
Status: DISCONTINUED | OUTPATIENT
Start: 2022-07-21 | End: 2022-07-22 | Stop reason: HOSPADM

## 2022-07-21 RX ORDER — CEFAZOLIN SODIUM/WATER 2 G/20 ML
2 SYRINGE (ML) INTRAVENOUS
Status: COMPLETED | OUTPATIENT
Start: 2022-07-21 | End: 2022-07-22

## 2022-07-21 RX ORDER — ACETAMINOPHEN 325 MG/1
325 TABLET ORAL ONCE
Status: COMPLETED | OUTPATIENT
Start: 2022-07-21 | End: 2022-07-22

## 2022-07-21 RX ORDER — METHYLERGONOVINE MALEATE 0.2 MG/ML
200 INJECTION INTRAVENOUS
Status: DISCONTINUED | OUTPATIENT
Start: 2022-07-21 | End: 2022-07-22 | Stop reason: HOSPADM

## 2022-07-21 RX ORDER — AZITHROMYCIN 500 MG/5ML
500 INJECTION, POWDER, LYOPHILIZED, FOR SOLUTION INTRAVENOUS
Status: COMPLETED | OUTPATIENT
Start: 2022-07-21 | End: 2022-07-22

## 2022-07-21 RX ADMIN — ONDANSETRON 4 MG: 2 INJECTION INTRAMUSCULAR; INTRAVENOUS at 20:06

## 2022-07-21 RX ADMIN — SODIUM CHLORIDE, POTASSIUM CHLORIDE, SODIUM LACTATE AND CALCIUM CHLORIDE 500 ML: 600; 310; 30; 20 INJECTION, SOLUTION INTRAVENOUS at 04:48

## 2022-07-21 RX ADMIN — METOCLOPRAMIDE HYDROCHLORIDE 10 MG: 5 INJECTION INTRAMUSCULAR; INTRAVENOUS at 19:34

## 2022-07-21 RX ADMIN — SODIUM CHLORIDE, POTASSIUM CHLORIDE, SODIUM LACTATE AND CALCIUM CHLORIDE 500 ML: 600; 310; 30; 20 INJECTION, SOLUTION INTRAVENOUS at 17:51

## 2022-07-21 RX ADMIN — SODIUM CHLORIDE, POTASSIUM CHLORIDE, SODIUM LACTATE AND CALCIUM CHLORIDE: 600; 310; 30; 20 INJECTION, SOLUTION INTRAVENOUS at 03:47

## 2022-07-21 RX ADMIN — FENTANYL CITRATE 100 MCG: 50 INJECTION, SOLUTION INTRAMUSCULAR; INTRAVENOUS at 05:45

## 2022-07-21 RX ADMIN — SODIUM CHLORIDE, POTASSIUM CHLORIDE, SODIUM LACTATE AND CALCIUM CHLORIDE 500 ML: 600; 310; 30; 20 INJECTION, SOLUTION INTRAVENOUS at 04:50

## 2022-07-21 RX ADMIN — SODIUM CHLORIDE, POTASSIUM CHLORIDE, SODIUM LACTATE AND CALCIUM CHLORIDE: 600; 310; 30; 20 INJECTION, SOLUTION INTRAVENOUS at 14:05

## 2022-07-21 RX ADMIN — ONDANSETRON 4 MG: 2 INJECTION INTRAMUSCULAR; INTRAVENOUS at 09:12

## 2022-07-21 RX ADMIN — SODIUM CHLORIDE, POTASSIUM CHLORIDE, SODIUM LACTATE AND CALCIUM CHLORIDE 1000 ML: 600; 310; 30; 20 INJECTION, SOLUTION INTRAVENOUS at 08:04

## 2022-07-21 RX ADMIN — SODIUM CHLORIDE, POTASSIUM CHLORIDE, SODIUM LACTATE AND CALCIUM CHLORIDE: 600; 310; 30; 20 INJECTION, SOLUTION INTRAVENOUS at 21:25

## 2022-07-21 RX ADMIN — MISOPROSTOL 25 MCG: 100 TABLET ORAL at 04:20

## 2022-07-21 RX ADMIN — Medication 2 MILLI-UNITS/MIN: at 08:57

## 2022-07-21 RX ADMIN — Medication 10 ML: at 08:14

## 2022-07-21 RX ADMIN — Medication 2 MILLI-UNITS/MIN: at 15:41

## 2022-07-21 RX ADMIN — Medication 10 ML/HR: at 08:49

## 2022-07-21 RX ADMIN — FENTANYL CITRATE 100 MCG: 50 INJECTION, SOLUTION INTRAMUSCULAR; INTRAVENOUS at 01:38

## 2022-07-21 RX ADMIN — FENTANYL CITRATE 100 MCG: 50 INJECTION, SOLUTION INTRAMUSCULAR; INTRAVENOUS at 00:00

## 2022-07-21 RX ADMIN — SODIUM CHLORIDE, POTASSIUM CHLORIDE, SODIUM LACTATE AND CALCIUM CHLORIDE 500 ML: 600; 310; 30; 20 INJECTION, SOLUTION INTRAVENOUS at 11:26

## 2022-07-21 RX ADMIN — FENTANYL CITRATE 100 MCG: 50 INJECTION, SOLUTION INTRAMUSCULAR; INTRAVENOUS at 02:40

## 2022-07-21 RX ADMIN — SODIUM CHLORIDE, POTASSIUM CHLORIDE, SODIUM LACTATE AND CALCIUM CHLORIDE: 600; 310; 30; 20 INJECTION, SOLUTION INTRAVENOUS at 09:21

## 2022-07-21 RX ADMIN — SODIUM CHLORIDE, POTASSIUM CHLORIDE, SODIUM LACTATE AND CALCIUM CHLORIDE: 600; 310; 30; 20 INJECTION, SOLUTION INTRAVENOUS at 14:03

## 2022-07-21 RX ADMIN — BUPIVACAINE HYDROCHLORIDE 8 ML: 2.5 INJECTION, SOLUTION EPIDURAL; INFILTRATION; INTRACAUDAL at 08:53

## 2022-07-21 RX ADMIN — ACETAMINOPHEN 650 MG: 325 TABLET, FILM COATED ORAL at 17:37

## 2022-07-21 RX ADMIN — METOCLOPRAMIDE HYDROCHLORIDE 10 MG: 5 INJECTION INTRAMUSCULAR; INTRAVENOUS at 12:34

## 2022-07-21 ASSESSMENT — ACTIVITIES OF DAILY LIVING (ADL)
ADLS_ACUITY_SCORE: 18

## 2022-07-21 NOTE — PROGRESS NOTES
[facility]  Labor Progress note    S: Patient feeling well with some increasing contractions.    O:   Patient Vitals for the past 4 hrs:   BP Temp Temp src Resp   22 0201 98/57 98  F (36.7  C) Oral 16     SVE: 3/60/-3, balloon removed    FHT  Baseline: 145  Variability: Moderate  Accels: Present  Decels: 8 minute late decel at 0445 nadiring at 60 bpm with return to baseline with fluids and repositioning   [place]: 2-3 in 10 minutes      A/P:  Reanna Gilbert is a 36 year old  at 37w3d by LMP c/w 11w1d US, here for induction of labor for Tetrology of Fallot and 22q microdeletion syndrome. Pregnancy is otherwise notable AMA status, FGR and mild polyhydramnios.     Induction of Labor   - C/L/H at presentation > s/p PV misoprostol #2> C/L/H (1945) > 2/50/-3, bedoya 60 mL (0015)>Balloon out, 3/60/-3 (0450) . Patient received miso at 0420. Plan to hold miso form now on and initiate pitocin at 0830 with AROM prn  - Pain: Discussed options. Desires eventual epidural.   - GBS negative.   - All uterotonic medications appropriate.      Tetrology of Fallot (ToF)  Pulmonary Atresia   22q11 Microdeletion Syndrome   Fetal Growth Restriction   - s/p Amnio 3/14 confirming genetic finding. FGR diagnosed at 27w gestation, likely in the context of known genetic syndrome.   - Fetal ECHO last performed 4/15/22  - Comprehensive US (22): EFW 2%, AC 2%.    - Plan NICU at delivery, CT Surgery and Peds Cards aware.      Routine Prenatal Care   - Rh positive - antibody negative, Rubella immune, GCT passed, GBS negative                 Fetal Well-Being    Cat II tracing, reactive; cephalic by BSUS; EFW 6lb  - Return to baseline with fluid bolus and repositioning  - Continue to monitor closely     Gold Ramesh MD  OBGYN PGY-3  2022 5:06 AM  '

## 2022-07-21 NOTE — PROGRESS NOTES
[facility]  Labor Progress note    S: Patient doing well at this time. Denies vaginal bleeding, leaking fluids. Had painful contractions throughout evening, hoping to get Epidural soon.     O:   Patient Vitals for the past 4 hrs:   BP Temp Temp src Resp   22 0556 113/54 98.6  F (37  C) Oral 16   Gen: Well appearing, no distress   SVE: Deferred.     FHT  Baseline: 145  Variability: Moderate  Accels: Present  Decels: None  [place]: 2-3 in 10 minutes    A/P:  Reanna Gilbert is a 36 year old  at 37w3d by LMP c/w 11w1d US, here for induction of labor for Tetrology of Fallot and 22q microdeletion syndrome. Pregnancy is otherwise notable AMA status, FGR and mild polyhydramnios.     Induction of Labor   - C/L/H (0900) > PV miso #2 (1425) >C/L/H (1945)>2/50/-3, ebdoya 60 ml (0015)> PV miso x3 (0420)>Bedoya out, 3/60/-3 (0450)> Plan to have Epidural placed at this time, Anesthesia contacted. IV Pitocin, eventual AROM thereafter.   - Pain: Discussed options. Desires eventual epidural.   - GBS negative.   - All uterotonic medications appropriate.      Tetrology of Fallot (ToF)  Pulmonary Atresia   22q11 Microdeletion Syndrome   Fetal Growth Restriction   - s/p Amnio 3/14 confirming genetic finding. FGR diagnosed at 27w gestation, likely in the context of known genetic syndrome.   - Fetal ECHO last performed 4/15/22  - Comprehensive US (22): EFW 2%, AC 2%.    - Plan NICU at delivery, CT Surgery and Peds Cards aware.      Routine Prenatal Care   - Rh positive - antibody negative, Rubella immune, GCT passed, GBS negative                 Fetal Well-Being    - Cat I tracing, reactive; cephalic by BSUS; EFW 6lb  - Reviewed Cat II tracing with patient from overnight, aware that low threshold for  if persistent Cat II tracing RFD.     Charlotte Roper MD   OB/GYN PGY-3  22 8:21 AM    Appreciate note by Dr. Roper. Patient has been seen and examined by me separate from the resident,  agree with above note. Patient comfortable with epidural. FHR reassuring. Anticipate .     Cece Gilbert MD  9:36 AM

## 2022-07-21 NOTE — PLAN OF CARE
Patient would like epidural placement prior to pitocin start.  Primary team notified.  Will await their plan of care for the day.

## 2022-07-21 NOTE — PROGRESS NOTES
Labor Note    S: Patient comfortable.   O:   Vitals:    22 1530 22 1600 22 1630   BP: 109/58 115/57 110/59   Pulse:      Resp:      Temp:      SpO2: 99% 97% 97%     FHT: 140 baseline, moderate variability,  2min decel noted- pitocin discontinued and patient moved to side with good recovery.   TOCO: Ctx Q3-7  SVE: /-2    A/P: 36 year old  @37w3d   Labor: Recurrent decels with pitocin augementation. Due to lack of cervical change and category 2 FHR with pitocin in baby with TOF, recommend primary CS. Patient and  in agreement. Risks and benefits of procedure reviewed in detail. Pitocin off now with recovery of FHR to category 1. Anesthesia and charge RN updated.     Cece Gilbert MD

## 2022-07-21 NOTE — PROGRESS NOTES
[facility]  Labor Progress note    S: Patient resting comfortably at this time with Epidural in place. Comfortable with SVE and AROM if appropriate.     O:   Patient Vitals for the past 4 hrs:   BP Temp Temp src Resp SpO2   22 1100 106/62 -- -- 16 98 %   22 1030 110/61 -- -- 16 98 %   22 1000 109/63 -- -- 16 96 %   22 0949 111/63 -- -- -- --   22 0930 -- -- -- -- 100 %   22 0918 118/60 -- -- -- --   22 0914 120/62 -- -- -- --   22 0908 117/66 -- -- -- --   22 0902 112/64 -- -- -- --   22 0901 119/59 -- -- -- --   22 0858 111/57 98.8  F (37.1  C) Oral 16 100 %   22 0856 116/63 -- -- -- --   22 0854 120/63 -- -- -- --   22 0852 114/68 -- -- -- --   22 0850 112/64 -- -- -- 100 %   22 0849 122/66 -- -- -- --   Gen: Well appearing, no distress   SVE: 3-4/60/-3, fetal station high and head ballotable out of pelvis.     FHT  Baseline: 145  Variability: Moderate with period of minimal variability for 15 minutes in between.   Accels: Present  Decels: Late decel x2 in setting of uterine tachysystole and during SVE (patient supine), amenable to position changes and intrauterine resuscitative measures.   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 Pregnancy is otherwise notable AMA status, FGR and mild polyhydramnios.     Induction of Labor   - C/L/H (00) > PV miso #2 (1425) >C/L/H (194)>2/50/-3, bedoya 60 ml (0015)> PV miso x3 (0420)>Bedoya out, 3/60/-3 (0450)> SVE unchanged at this time. Pitocin currently at 5 mU/min. AROM not performed due to high fetal station. Given 500cc LR at this time for uterine tachysystole and short period of minimal variability with above decels.   - GBS negative.   - All uterotonic medications appropriate.      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 FGR diagnosed at 27w gestation, likely in the context of known genetic syndrome.   - Fetal ECHO last performed 4/15/22  - Comprehensive US (22): EFW 2%, AC 2%.    - Plan NICU at delivery, CT Surgery and Peds Cards aware.   - Low threshold for  in the setting of known cardiac anomalies to best optimize  care.      Routine Prenatal Care   - Rh positive - antibody negative, Rubella immune, GCT passed, GBS negative                 Fetal Well-Being    - Cat II tracing, although overall reactive and reassuring; cephalic by BSUS; EFW 6lb    Charlotte Roper MD   OB/GYN PGY-3  22 11:43 AM

## 2022-07-21 NOTE — PROGRESS NOTES
[facility]  Labor Progress Note    S: Patient feeling well at this and feeling ongoing contractions.     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 Pregnancy is otherwise notable AMA status, FGR and mild polyhydramnios.     Induction of Labor   - C/L/H at presentation > s/p PV misoprostol #2> C/L/H (1945). Will hold miso at this time with frequent contractions. Plan to recheck in 4 hours and attempt bedoya balloon.  - Pain: Discussed options. Desires eventual epidural.   - GBS negative.   - All uterotonic medications appropriate.      Tetrology of Fallot (ToF)  Pulmonary Atresia   22q11 Microdeletion Syndrome   Fetal Growth Restriction   - s/p Amnio 3/14 confirming genetic finding. FGR diagnosed at 27w gestation, likely in the context of known genetic syndrome.   - Fetal ECHO last performed 4/15/22  - Comprehensive US (22): EFW 2%, AC 2%.    - Plan NICU at delivery, CT Surgery and Peds Cards aware.      Routine Prenatal Care   - Rh positive - antibody negative, Rubella immune, GCT passed, GBS negative                 Fetal Well-Being    Cat I tracing, reactive; cephalic by BSUS; EFW 6lb    Gold Ramesh MD  OBGYN PGY-3  2022 8:04 PM

## 2022-07-21 NOTE — PROGRESS NOTES
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 Pregnancy is otherwise notable AMA status, FGR and mild polyhydramnios.     Induction of Labor   - C/L/H at presentation > s/p PV misoprostol #2> C/L/H (1945) > 2/50/-3, bedoya 60 mL (5) Will hold miso at this time with frequent contractions.  - Pain: Discussed options. Desires eventual epidural.   - GBS negative.   - All uterotonic medications appropriate.      Tetrology of Fallot (ToF)  Pulmonary Atresia   22q11 Microdeletion Syndrome   Fetal Growth Restriction   - s/p Amnio 3/14 confirming genetic finding. FGR diagnosed at 27w gestation, likely in the context of known genetic syndrome.   - Fetal ECHO last performed 4/15/22  - Comprehensive US (22): EFW 2%, AC 2%.    - Plan NICU at delivery, CT Surgery and Peds Cards aware.      Routine Prenatal Care   - Rh positive - antibody negative, Rubella immune, GCT passed, GBS negative                 Fetal Well-Being    Cat I tracing, reactive; cephalic by BSUS; EFW 6lb    Amanda Cho MD  Ob/Gyn Resident, PGY-2  2022 12:08 AM

## 2022-07-21 NOTE — PROVIDER NOTIFICATION
07/21/22 0153   Provider Notification   Provider Name/Title Dr. Cho and Dr. Ramesh   Method of Notification In Department   Request Evaluate - Remote   Notification Reason Pain   Patient in pain from bedoya bulb. Requesting that balloon be deflated. Patient given fentanyl for pain, will reassess in 20 minutes.  Plan to educate patient on safety of bedoya bulb as method of induction for maternal and fetal well-being

## 2022-07-21 NOTE — ANESTHESIA PREPROCEDURE EVALUATION
Anesthesia Pre-Procedure Evaluation    Patient: Reanna Gilbert   MRN: [id number] : 1985        Procedure :           Past Medical History:   Diagnosis Date     Abnormal Pap smear of cervix     3 years ago; normal since     Known health problems: none      Varicose veins of lower extremity     left knee      Past Surgical History:   Procedure Laterality Date     ARTHROSCOPIC REPAIR ACL  2001     wisdom teeth  2004      No Known Allergies   Social History     Tobacco Use     Smoking status: Never Smoker     Smokeless tobacco: Never Used   Substance Use Topics     Alcohol use: Yes     Comment: socially      Wt Readings from Last 1 Encounters:   22 75.6 kg (166 lb 9.6 oz)        Anesthesia Evaluation            ROS/MED HX  ENT/Pulmonary:  - neg pulmonary ROS     Neurologic:  - neg neurologic ROS     Cardiovascular:  - neg cardiovascular ROS     METS/Exercise Tolerance:     Hematologic: Comments: plt 140s      Musculoskeletal:       GI/Hepatic:     (+) GERD,     Renal/Genitourinary:       Endo:  - neg endo ROS     Psychiatric/Substance Use:  - neg psychiatric ROS     Infectious Disease:       Malignancy:       Other:   Fetal tetrology of Fallot associated with 22q11 deletion    Mild poly  (-) previous        Physical Exam    Airway        Mallampati: III   TM distance: > 3 FB   Neck ROM: full   Mouth opening: > 3 cm    Respiratory Devices and Support         Dental  no notable dental history         Cardiovascular   cardiovascular exam normal          Pulmonary   pulmonary exam normal                OUTSIDE LABS:  CBC:   Lab Results   Component Value Date    WBC 7.1 2022    WBC 6.9 2020    HGB 12.0 2022    HGB 15.3 2020    HCT 35.7 2022    HCT 46.5 2020     (L) 2022     2020     BMP:   Lab Results   Component Value Date     2020    POTASSIUM 3.8 2020    CHLORIDE 109 2020    CO2 22 2020    BUN 10 2020     CR 0.67 04/27/2020    GLC 84 04/27/2020     COAGS: No results found for: PTT, INR, FIBR  POC:   Lab Results   Component Value Date    HCG Negative 04/27/2020     HEPATIC: No results found for: ALBUMIN, PROTTOTAL, ALT, AST, GGT, ALKPHOS, BILITOTAL, BILIDIRECT, DOREEN  OTHER:   Lab Results   Component Value Date    A1C 4.8 04/27/2020    CATALINA 9.5 04/27/2020    CRP 4.0 04/27/2020    SED 7 04/27/2020       Anesthesia Plan    ASA Status:  2, emergent       Anesthesia Type: Epidural.              Consents    Anesthesia Plan(s) and associated risks, benefits, and realistic alternatives discussed. Questions answered and patient/representative(s) expressed understanding.    - Discussed:     - Discussed with:  Patient         Postoperative Care            Comments:                Teresa Black MD

## 2022-07-21 NOTE — PLAN OF CARE
Care assumed 1900. VSS, afebrile. IV infusing LR at 125 ml/hr. Patient voiding spontaneously. Ambulating in hallway over evening shift and off the monitor to shower 0821-3520. Presley bulb placed at 2355 with 60 mL of fluid. Patient received fentanyl for cramping pain x4 this shift. Reporting intermittent nausea with pain, but resolves after pain is treated. Offered hot packs to help with cramping, patient reporting no relief from hot packs. See flowsheets for uterine activity and FHR. Prolonged decel at [id number]. Patient repositioned and 500 mL fluid bolus given. Presley bulb out at 0450. SVE 3/60%/-3. Support person at bedside. Patient planning on epidural for pain.       Goal Outcome Evaluation: Patient agreeable to plan of induction, questions answered and encouraged.

## 2022-07-21 NOTE — PROGRESS NOTES
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 Pregnancy is otherwise notable AMA status, FGR and mild polyhydramnios.     Induction of Labor   - C/L/H at presentation > s/p PV misoprostol #2> C/L/H () > 50/-3, bedoya 60 mL (15) Will hold miso at this time with frequent contractions.  - Pain: Discussed options. Desires eventual epidural.   - GBS negative.   - All uterotonic medications appropriate.      Tetrology of Fallot (ToF)  Pulmonary Atresia   22q11 Microdeletion Syndrome   Fetal Growth Restriction   - s/p Amnio 3/14 confirming genetic finding. FGR diagnosed at 27w gestation, likely in the context of known genetic syndrome.   - Fetal ECHO last performed 4/15/22  - Comprehensive US (22): EFW 2%, AC 2%.    - Plan NICU at delivery, CT Surgery and Peds Cards aware.      Routine Prenatal Care   - Rh positive - antibody negative, Rubella immune, GCT passed, GBS negative                 Fetal Well-Being    Cat I tracing, reactive; cephalic by BSUS; EFW 6lb    Amanda Cho MD  Ob/Gyn Resident, PGY-2  2022 2:49 AM

## 2022-07-21 NOTE — PROGRESS NOTES
Labor Note    S: Comfortable with epidural.   O:   Vitals:    22 1330 22 1400 22 1430   BP:  95/52 103/51   Pulse:      Resp:  16    Temp:  98.6  F (37  C)    SpO2: 96% 98% 96%     SVE: 4/70/-2 head applied to cervix  AROM performed copious clear fluid    Following AROM FHR  x3.5 min with recovery to 150s  Baseline 150 moderate variability, accels absent, decels absent  TOCO: Ctx Q4min    A/P: 36 year old  @37w3d   Labor: Pitocin held with deceleration following AROM. Will restart after 30min.   GBS: Negative  Fetal TOF- NICU at delivery  Anticipate     Cece Gilbert MD

## 2022-07-21 NOTE — ANESTHESIA PROCEDURE NOTES
Epidural catheter Procedure Note    Pre-Procedure   Staff -        Anesthesiologist:  Teresa Black MD       Resident/Fellow: Betty Wu MD       Performed By: resident       Location: OB       Pre-Anesthestic Checklist: patient identified, IV checked, risks and benefits discussed, informed consent, monitors and equipment checked, pre-op evaluation, at physician/surgeon's request and post-op pain management  Timeout:       Correct Patient: Yes        Correct Procedure: Yes        Correct Site: Yes        Correct Position: Yes   Procedure Documentation  Procedure: epidural catheter       Diagnosis: labor analgesia       Patient Position: sitting       Skin prep: Chloraprep       Local skin infiltrated with 3 mL of 1% lidocaine.        Insertion Site: L3-4. (midline approach).       Technique: LORT saline        DIO at 6.5 cm.       Needle Type: Miepley needle       Needle Gauge: 17.        Needle Length (Inches): 3.5        Catheter: 19 G.          Catheter threaded easily.         3 cm epidural space.         Threaded 9.5 cm at skin.         # of attempts: 2 and  # of redirects:  1    Assessment/Narrative         Paresthesias: No.       Test dose of 3 mL lidocaine 1.5% w/ 1:200,000 epinephrine at 08:41 CDT.         Test dose negative, 3 minutes after injection, for signs of intravascular, subdural, or intrathecal injection.       Insertion/Infusion Method: LORT saline       No aspiration negative for Heme or CSF via Epidural Catheter.       Sensory Level Left: T6.       Sensory Level Right: T5.    Medication(s) Administered   0.125% bupivacaine 5 mL + fentanyl 20 mcg + NS 5 mL (Epidural) (Mixture components: bupivacaine HCl (PF) 0.25 % Soln, 5 mL; fentaNYL (PF) 100 MCG/2ML Soln, 20 mcg; sodium chloride 0.9 % Soln, 5 mL) - EPIDURAL   8 mL - 7/21/2022 8:53:00 AM

## 2022-07-22 PROCEDURE — 250N000011 HC RX IP 250 OP 636

## 2022-07-22 PROCEDURE — 710N000010 HC RECOVERY PHASE 1, LEVEL 2, PER MIN: Performed by: OBSTETRICS & GYNECOLOGY

## 2022-07-22 PROCEDURE — 250N000011 HC RX IP 250 OP 636: Performed by: STUDENT IN AN ORGANIZED HEALTH CARE EDUCATION/TRAINING PROGRAM

## 2022-07-22 PROCEDURE — 250N000013 HC RX MED GY IP 250 OP 250 PS 637: Performed by: STUDENT IN AN ORGANIZED HEALTH CARE EDUCATION/TRAINING PROGRAM

## 2022-07-22 PROCEDURE — 88307 TISSUE EXAM BY PATHOLOGIST: CPT | Mod: TC

## 2022-07-22 PROCEDURE — 271N000001 HC OR GENERAL SUPPLY NON-STERILE: Performed by: OBSTETRICS & GYNECOLOGY

## 2022-07-22 PROCEDURE — 258N000003 HC RX IP 258 OP 636

## 2022-07-22 PROCEDURE — 250N000013 HC RX MED GY IP 250 OP 250 PS 637

## 2022-07-22 PROCEDURE — 258N000003 HC RX IP 258 OP 636: Performed by: STUDENT IN AN ORGANIZED HEALTH CARE EDUCATION/TRAINING PROGRAM

## 2022-07-22 PROCEDURE — C9290 INJ, BUPIVACAINE LIPOSOME: HCPCS | Performed by: ANESTHESIOLOGY

## 2022-07-22 PROCEDURE — 272N000001 HC OR GENERAL SUPPLY STERILE: Performed by: OBSTETRICS & GYNECOLOGY

## 2022-07-22 PROCEDURE — 88307 TISSUE EXAM BY PATHOLOGIST: CPT | Mod: 26 | Performed by: PATHOLOGY

## 2022-07-22 PROCEDURE — 250N000011 HC RX IP 250 OP 636: Performed by: ANESTHESIOLOGY

## 2022-07-22 PROCEDURE — 250N000009 HC RX 250

## 2022-07-22 PROCEDURE — 370N000017 HC ANESTHESIA TECHNICAL FEE, PER MIN: Performed by: OBSTETRICS & GYNECOLOGY

## 2022-07-22 PROCEDURE — 360N000076 HC SURGERY LEVEL 3, PER MIN: Performed by: OBSTETRICS & GYNECOLOGY

## 2022-07-22 PROCEDURE — 120N000002 HC R&B MED SURG/OB UMMC

## 2022-07-22 PROCEDURE — 59514 CESAREAN DELIVERY ONLY: CPT | Mod: GC | Performed by: OBSTETRICS & GYNECOLOGY

## 2022-07-22 RX ORDER — OXYTOCIN 10 [USP'U]/ML
10 INJECTION, SOLUTION INTRAMUSCULAR; INTRAVENOUS
Status: DISCONTINUED | OUTPATIENT
Start: 2022-07-22 | End: 2022-07-25 | Stop reason: HOSPADM

## 2022-07-22 RX ORDER — MODIFIED LANOLIN
OINTMENT (GRAM) TOPICAL
Status: DISCONTINUED | OUTPATIENT
Start: 2022-07-22 | End: 2022-07-25 | Stop reason: HOSPADM

## 2022-07-22 RX ORDER — ONDANSETRON 4 MG/1
4 TABLET, ORALLY DISINTEGRATING ORAL EVERY 6 HOURS PRN
Status: DISCONTINUED | OUTPATIENT
Start: 2022-07-22 | End: 2022-07-25 | Stop reason: HOSPADM

## 2022-07-22 RX ORDER — ONDANSETRON 4 MG/1
4 TABLET, ORALLY DISINTEGRATING ORAL EVERY 30 MIN PRN
Status: DISCONTINUED | OUTPATIENT
Start: 2022-07-22 | End: 2022-07-22 | Stop reason: HOSPADM

## 2022-07-22 RX ORDER — METOCLOPRAMIDE HYDROCHLORIDE 5 MG/ML
10 INJECTION INTRAMUSCULAR; INTRAVENOUS EVERY 6 HOURS PRN
Status: DISCONTINUED | OUTPATIENT
Start: 2022-07-22 | End: 2022-07-25 | Stop reason: HOSPADM

## 2022-07-22 RX ORDER — ACETAMINOPHEN 325 MG/1
975 TABLET ORAL EVERY 6 HOURS
Status: DISCONTINUED | OUTPATIENT
Start: 2022-07-22 | End: 2022-07-25 | Stop reason: HOSPADM

## 2022-07-22 RX ORDER — PROCHLORPERAZINE 25 MG
25 SUPPOSITORY, RECTAL RECTAL EVERY 12 HOURS PRN
Status: DISCONTINUED | OUTPATIENT
Start: 2022-07-22 | End: 2022-07-25 | Stop reason: HOSPADM

## 2022-07-22 RX ORDER — TRANEXAMIC ACID 10 MG/ML
1 INJECTION, SOLUTION INTRAVENOUS EVERY 30 MIN PRN
Status: DISCONTINUED | OUTPATIENT
Start: 2022-07-22 | End: 2022-07-25 | Stop reason: HOSPADM

## 2022-07-22 RX ORDER — ONDANSETRON 2 MG/ML
4 INJECTION INTRAMUSCULAR; INTRAVENOUS EVERY 6 HOURS PRN
Status: DISCONTINUED | OUTPATIENT
Start: 2022-07-22 | End: 2022-07-25 | Stop reason: HOSPADM

## 2022-07-22 RX ORDER — MISOPROSTOL 200 UG/1
800 TABLET ORAL
Status: DISCONTINUED | OUTPATIENT
Start: 2022-07-22 | End: 2022-07-25 | Stop reason: HOSPADM

## 2022-07-22 RX ORDER — IBUPROFEN 600 MG/1
600 TABLET, FILM COATED ORAL EVERY 6 HOURS PRN
Qty: 60 TABLET | Refills: 0 | Status: SHIPPED | OUTPATIENT
Start: 2022-07-22

## 2022-07-22 RX ORDER — PROCHLORPERAZINE MALEATE 10 MG
10 TABLET ORAL EVERY 6 HOURS PRN
Status: DISCONTINUED | OUTPATIENT
Start: 2022-07-22 | End: 2022-07-25 | Stop reason: HOSPADM

## 2022-07-22 RX ORDER — ACETAMINOPHEN 325 MG/1
650 TABLET ORAL EVERY 6 HOURS PRN
Qty: 100 TABLET | Refills: 0 | Status: SHIPPED | OUTPATIENT
Start: 2022-07-22

## 2022-07-22 RX ORDER — BUPIVACAINE HYDROCHLORIDE 2.5 MG/ML
INJECTION, SOLUTION EPIDURAL; INFILTRATION; INTRACAUDAL
Status: COMPLETED | OUTPATIENT
Start: 2022-07-22 | End: 2022-07-22

## 2022-07-22 RX ORDER — DEXTROSE, SODIUM CHLORIDE, SODIUM LACTATE, POTASSIUM CHLORIDE, AND CALCIUM CHLORIDE 5; .6; .31; .03; .02 G/100ML; G/100ML; G/100ML; G/100ML; G/100ML
INJECTION, SOLUTION INTRAVENOUS CONTINUOUS
Status: DISCONTINUED | OUTPATIENT
Start: 2022-07-22 | End: 2022-07-25 | Stop reason: HOSPADM

## 2022-07-22 RX ORDER — SIMETHICONE 80 MG
80 TABLET,CHEWABLE ORAL 4 TIMES DAILY PRN
Status: DISCONTINUED | OUTPATIENT
Start: 2022-07-22 | End: 2022-07-25 | Stop reason: HOSPADM

## 2022-07-22 RX ORDER — OXYTOCIN/0.9 % SODIUM CHLORIDE 30/500 ML
340 PLASTIC BAG, INJECTION (ML) INTRAVENOUS CONTINUOUS PRN
Status: DISCONTINUED | OUTPATIENT
Start: 2022-07-22 | End: 2022-07-25 | Stop reason: HOSPADM

## 2022-07-22 RX ORDER — SODIUM CHLORIDE, SODIUM LACTATE, POTASSIUM CHLORIDE, CALCIUM CHLORIDE 600; 310; 30; 20 MG/100ML; MG/100ML; MG/100ML; MG/100ML
INJECTION, SOLUTION INTRAVENOUS CONTINUOUS
Status: DISCONTINUED | OUTPATIENT
Start: 2022-07-22 | End: 2022-07-22 | Stop reason: HOSPADM

## 2022-07-22 RX ORDER — FENTANYL CITRATE 50 UG/ML
INJECTION, SOLUTION INTRAMUSCULAR; INTRAVENOUS
Status: COMPLETED | OUTPATIENT
Start: 2022-07-22 | End: 2022-07-22

## 2022-07-22 RX ORDER — KETOROLAC TROMETHAMINE 30 MG/ML
30 INJECTION, SOLUTION INTRAMUSCULAR; INTRAVENOUS EVERY 6 HOURS
Status: COMPLETED | OUTPATIENT
Start: 2022-07-22 | End: 2022-07-22

## 2022-07-22 RX ORDER — MORPHINE SULFATE 1 MG/ML
INJECTION, SOLUTION EPIDURAL; INTRATHECAL; INTRAVENOUS
Status: COMPLETED | OUTPATIENT
Start: 2022-07-22 | End: 2022-07-22

## 2022-07-22 RX ORDER — DIPHENHYDRAMINE HYDROCHLORIDE 50 MG/ML
50 INJECTION INTRAMUSCULAR; INTRAVENOUS EVERY 6 HOURS PRN
Status: DISCONTINUED | OUTPATIENT
Start: 2022-07-22 | End: 2022-07-25 | Stop reason: HOSPADM

## 2022-07-22 RX ORDER — OXYTOCIN/0.9 % SODIUM CHLORIDE 30/500 ML
100-340 PLASTIC BAG, INJECTION (ML) INTRAVENOUS CONTINUOUS PRN
Status: DISCONTINUED | OUTPATIENT
Start: 2022-07-22 | End: 2022-07-25 | Stop reason: HOSPADM

## 2022-07-22 RX ORDER — METOCLOPRAMIDE 10 MG/1
10 TABLET ORAL EVERY 6 HOURS PRN
Status: DISCONTINUED | OUTPATIENT
Start: 2022-07-22 | End: 2022-07-25 | Stop reason: HOSPADM

## 2022-07-22 RX ORDER — HYDROXYZINE HYDROCHLORIDE 25 MG/1
25 TABLET, FILM COATED ORAL EVERY 6 HOURS PRN
Status: DISCONTINUED | OUTPATIENT
Start: 2022-07-22 | End: 2022-07-22 | Stop reason: HOSPADM

## 2022-07-22 RX ORDER — OXYCODONE HYDROCHLORIDE 5 MG/1
5 TABLET ORAL EVERY 4 HOURS PRN
Status: DISCONTINUED | OUTPATIENT
Start: 2022-07-22 | End: 2022-07-25 | Stop reason: HOSPADM

## 2022-07-22 RX ORDER — METHYLERGONOVINE MALEATE 0.2 MG/ML
200 INJECTION INTRAVENOUS
Status: DISCONTINUED | OUTPATIENT
Start: 2022-07-22 | End: 2022-07-25 | Stop reason: HOSPADM

## 2022-07-22 RX ORDER — BUPIVACAINE HYDROCHLORIDE 2.5 MG/ML
INJECTION, SOLUTION EPIDURAL; INFILTRATION; INTRACAUDAL
Status: DISCONTINUED | OUTPATIENT
Start: 2022-07-22 | End: 2022-07-22

## 2022-07-22 RX ORDER — BISACODYL 10 MG
10 SUPPOSITORY, RECTAL RECTAL DAILY PRN
Status: DISCONTINUED | OUTPATIENT
Start: 2022-07-24 | End: 2022-07-25 | Stop reason: HOSPADM

## 2022-07-22 RX ORDER — FENTANYL CITRATE-0.9 % NACL/PF 10 MCG/ML
100 PLASTIC BAG, INJECTION (ML) INTRAVENOUS EVERY 5 MIN PRN
Status: DISCONTINUED | OUTPATIENT
Start: 2022-07-22 | End: 2022-07-22 | Stop reason: HOSPADM

## 2022-07-22 RX ORDER — MISOPROSTOL 200 UG/1
400 TABLET ORAL
Status: DISCONTINUED | OUTPATIENT
Start: 2022-07-22 | End: 2022-07-25 | Stop reason: HOSPADM

## 2022-07-22 RX ORDER — AMOXICILLIN 250 MG
1 CAPSULE ORAL DAILY
Qty: 100 TABLET | Refills: 0 | Status: SHIPPED | OUTPATIENT
Start: 2022-07-22

## 2022-07-22 RX ORDER — AMOXICILLIN 250 MG
2 CAPSULE ORAL 2 TIMES DAILY
Status: DISCONTINUED | OUTPATIENT
Start: 2022-07-22 | End: 2022-07-25 | Stop reason: HOSPADM

## 2022-07-22 RX ORDER — LIDOCAINE 40 MG/G
CREAM TOPICAL
Status: DISCONTINUED | OUTPATIENT
Start: 2022-07-22 | End: 2022-07-25 | Stop reason: HOSPADM

## 2022-07-22 RX ORDER — LIDOCAINE HYDROCHLORIDE 20 MG/ML
INJECTION, SOLUTION EPIDURAL; INFILTRATION; INTRACAUDAL; PERINEURAL PRN
Status: DISCONTINUED | OUTPATIENT
Start: 2022-07-22 | End: 2022-07-22

## 2022-07-22 RX ORDER — AMOXICILLIN 250 MG
1 CAPSULE ORAL 2 TIMES DAILY
Status: DISCONTINUED | OUTPATIENT
Start: 2022-07-22 | End: 2022-07-25 | Stop reason: HOSPADM

## 2022-07-22 RX ORDER — HYDROCORTISONE 25 MG/G
CREAM TOPICAL 3 TIMES DAILY PRN
Status: DISCONTINUED | OUTPATIENT
Start: 2022-07-22 | End: 2022-07-25 | Stop reason: HOSPADM

## 2022-07-22 RX ORDER — IBUPROFEN 800 MG/1
800 TABLET, FILM COATED ORAL EVERY 6 HOURS
Status: DISCONTINUED | OUTPATIENT
Start: 2022-07-23 | End: 2022-07-25 | Stop reason: HOSPADM

## 2022-07-22 RX ORDER — ONDANSETRON 2 MG/ML
4 INJECTION INTRAMUSCULAR; INTRAVENOUS EVERY 30 MIN PRN
Status: DISCONTINUED | OUTPATIENT
Start: 2022-07-22 | End: 2022-07-22 | Stop reason: HOSPADM

## 2022-07-22 RX ORDER — CARBOPROST TROMETHAMINE 250 UG/ML
250 INJECTION, SOLUTION INTRAMUSCULAR
Status: DISCONTINUED | OUTPATIENT
Start: 2022-07-22 | End: 2022-07-25 | Stop reason: HOSPADM

## 2022-07-22 RX ORDER — BENZOCAINE/MENTHOL 6 MG-10 MG
LOZENGE MUCOUS MEMBRANE 2 TIMES DAILY
Status: DISCONTINUED | OUTPATIENT
Start: 2022-07-22 | End: 2022-07-25 | Stop reason: HOSPADM

## 2022-07-22 RX ADMIN — BUPIVACAINE HYDROCHLORIDE 1.4 ML: 2.5 INJECTION, SOLUTION EPIDURAL; INFILTRATION; INTRACAUDAL; PERINEURAL at 01:19

## 2022-07-22 RX ADMIN — KETOROLAC TROMETHAMINE 30 MG: 30 INJECTION, SOLUTION INTRAMUSCULAR; INTRAVENOUS at 14:58

## 2022-07-22 RX ADMIN — SENNOSIDES AND DOCUSATE SODIUM 2 TABLET: 50; 8.6 TABLET ORAL at 20:40

## 2022-07-22 RX ADMIN — ACETAMINOPHEN 975 MG: 325 TABLET, FILM COATED ORAL at 19:25

## 2022-07-22 RX ADMIN — HYDROCORTISONE: 1 CREAM TOPICAL at 21:14

## 2022-07-22 RX ADMIN — LIDOCAINE HYDROCHLORIDE 5 ML: 20 INJECTION, SOLUTION EPIDURAL; INFILTRATION; INTRACAUDAL; PERINEURAL at 00:46

## 2022-07-22 RX ADMIN — FENTANYL CITRATE 35 MCG: 50 INJECTION, SOLUTION INTRAMUSCULAR; INTRAVENOUS at 01:52

## 2022-07-22 RX ADMIN — SODIUM CHLORIDE, POTASSIUM CHLORIDE, SODIUM LACTATE AND CALCIUM CHLORIDE: 600; 310; 30; 20 INJECTION, SOLUTION INTRAVENOUS at 00:59

## 2022-07-22 RX ADMIN — ONDANSETRON 4 MG: 2 INJECTION INTRAMUSCULAR; INTRAVENOUS at 13:50

## 2022-07-22 RX ADMIN — PHENYLEPHRINE HYDROCHLORIDE 200 MCG: 10 INJECTION INTRAVENOUS at 01:34

## 2022-07-22 RX ADMIN — ACETAMINOPHEN 975 MG: 325 TABLET, FILM COATED ORAL at 12:34

## 2022-07-22 RX ADMIN — Medication 2 G: at 01:02

## 2022-07-22 RX ADMIN — KETOROLAC TROMETHAMINE 30 MG: 30 INJECTION, SOLUTION INTRAMUSCULAR at 02:12

## 2022-07-22 RX ADMIN — BUPIVACAINE 20 ML: 13.3 INJECTION, SUSPENSION, LIPOSOMAL INFILTRATION at 02:22

## 2022-07-22 RX ADMIN — ACETAMINOPHEN 650 MG: 325 TABLET, FILM COATED ORAL at 00:29

## 2022-07-22 RX ADMIN — PHENYLEPHRINE HYDROCHLORIDE 200 MCG: 10 INJECTION INTRAVENOUS at 01:24

## 2022-07-22 RX ADMIN — MORPHINE SULFATE 0.15 MG: 1 INJECTION EPIDURAL; INTRATHECAL; INTRAVENOUS at 01:19

## 2022-07-22 RX ADMIN — FENTANYL CITRATE 50 MCG: 50 INJECTION, SOLUTION INTRAMUSCULAR; INTRAVENOUS at 01:48

## 2022-07-22 RX ADMIN — ACETAMINOPHEN 975 MG: 325 TABLET, FILM COATED ORAL at 05:51

## 2022-07-22 RX ADMIN — BUPIVACAINE HYDROCHLORIDE 20 ML: 2.5 INJECTION, SOLUTION EPIDURAL; INFILTRATION; INTRACAUDAL at 02:22

## 2022-07-22 RX ADMIN — SODIUM CITRATE AND CITRIC ACID MONOHYDRATE 30 ML: 500; 334 SOLUTION ORAL at 00:28

## 2022-07-22 RX ADMIN — KETOROLAC TROMETHAMINE 30 MG: 30 INJECTION, SOLUTION INTRAMUSCULAR; INTRAVENOUS at 21:10

## 2022-07-22 RX ADMIN — METOCLOPRAMIDE HYDROCHLORIDE 10 MG: 5 INJECTION INTRAMUSCULAR; INTRAVENOUS at 09:24

## 2022-07-22 RX ADMIN — Medication 500 MG: at 01:02

## 2022-07-22 RX ADMIN — PROCHLORPERAZINE EDISYLATE 10 MG: 5 INJECTION INTRAMUSCULAR; INTRAVENOUS at 03:42

## 2022-07-22 RX ADMIN — PHENYLEPHRINE HYDROCHLORIDE 50 MCG/MIN: 10 INJECTION INTRAVENOUS at 01:20

## 2022-07-22 RX ADMIN — ACETAMINOPHEN 325 MG: 325 TABLET, FILM COATED ORAL at 00:29

## 2022-07-22 RX ADMIN — KETOROLAC TROMETHAMINE 30 MG: 30 INJECTION, SOLUTION INTRAMUSCULAR; INTRAVENOUS at 08:56

## 2022-07-22 RX ADMIN — PHENYLEPHRINE HYDROCHLORIDE 100 MCG: 10 INJECTION INTRAVENOUS at 02:03

## 2022-07-22 RX ADMIN — ONDANSETRON 4 MG: 2 INJECTION INTRAMUSCULAR; INTRAVENOUS at 02:53

## 2022-07-22 RX ADMIN — FENTANYL CITRATE 15 MCG: 50 INJECTION, SOLUTION INTRAMUSCULAR; INTRAVENOUS at 01:19

## 2022-07-22 ASSESSMENT — ACTIVITIES OF DAILY LIVING (ADL)
ADLS_ACUITY_SCORE: 24
ADLS_ACUITY_SCORE: 22
ADLS_ACUITY_SCORE: 24
ADLS_ACUITY_SCORE: 18
ADLS_ACUITY_SCORE: 20
ADLS_ACUITY_SCORE: 20
ADLS_ACUITY_SCORE: 18
ADLS_ACUITY_SCORE: 18
ADLS_ACUITY_SCORE: 24
ADLS_ACUITY_SCORE: 22
ADLS_ACUITY_SCORE: 20
ADLS_ACUITY_SCORE: 20

## 2022-07-22 NOTE — DISCHARGE SUMMARY
[facility] Discharge Summary    Reanna Gilbert MRN# [id number]   Age: 36 year old YOB: 1985     Date of Admission:  2022  Date of Discharge::  2022    Admitting Physician:  Alyssa Rankin MD  Discharge Physician:  Sharon Andres MD          Admission Diagnoses:   - Intrauterine pregnancy at 37w4d  - Fetal Tetrology of Fallot   - Fetal 22q11 microdeletion   - FGR          Discharge Diagnosis:   - Same, delivered   - Contact dermatitis         Procedures:   - Primary low transverse  section with two layer closure via Pfannenstiel skin incision  - TAP Block         Medications Prior to Admission:     Medications Prior to Admission   Medication Sig Dispense Refill Last Dose     azelastine (ASTELIN) 0.1 % nasal spray Spray 2 sprays into both nostrils 2 times daily as needed for rhinitis 30 mL 3      cetirizine (ZYRTEC) 10 MG tablet Take 1 tablet by mouth daily        EPINEPHrine (ANY BX GENERIC EQUIV) 0.3 MG/0.3ML injection 2-pack Inject 0.3 mLs (0.3 mg) into the muscle as needed for anaphylaxis (Patient not taking: Reported on 2020) 0.6 mL 3      fluticasone (FLONASE) 50 MCG/ACT nasal spray Spray 2 sprays into both nostrils daily 16 g 3      ORDER FOR ALLERGEN IMMUNOTHERAPY Name of Mix: Mix #1  Tree , Weeds  Birch Mix PRW 1:20 w/v, HS  0.5 ml  Boxelder-Maple Mix BHR (Boxelder Hard Red) 1:20 w/v, HS  0.5 ml  Ventura, Common 1:20 w/v, HS  0.5 ml  Pine, White 1:20 w/v, ALK 0.5 ml  Kochia 1:20 w/v, HS 0.5 ml  Plantain, English 1:20 w/v, HS 0.5 ml  Ragweed Mixed 1:20 w/v ALK  0.5 ml  Diluent: HSA qs to 5ml 5 mL PRN      ORDER FOR ALLERGEN IMMUNOTHERAPY Name of Mix: Mix #2  Dust Mite, Cat, Dog, Grass  Cat Hair, Standardized 10,000 BAU/mL, ALK  2.0 ml  Dog Hair-Dander, A. P.  1:100 w/v, HS  1.0 ml  Dust Mites DP. 10,000 AU/mL, HS  0.5 ml   Adis Grass (Std) 100,000 BAU/mL, HS 0.3 ml  Diluent: HSA qs to 5ml 5 mL PRN      ORDER FOR ALLERGEN  IMMUNOTHERAPY Name of Mix: Mix #3  Mold  Epicoccum Nigrum 1:10 w/v, HS 0.5 ml  Diluent: HSA qs to 5ml 5 mL PRN      Prenatal Vit-Fe Fumarate-FA (PRENATAL VITAMIN PO) Take 1 tablet by mouth daily        SUMAtriptan (IMITREX) 50 MG tablet Take 1 tablet (50 mg) by mouth at onset of headache for migraine 10 tablet 3      [DISCONTINUED] aspirin (ASA) 325 MG EC tablet Take 1 tablet (325 mg) by mouth daily 100 tablet 3      [DISCONTINUED] fexofenadine (ALLEGRA) 180 MG tablet Take 180 mg by mouth daily                  Discharge Medications:        Review of your medicines      START taking      Dose / Directions   acetaminophen 325 MG tablet  Commonly known as: TYLENOL  Used for: S/P  section      Dose: 650 mg  Take 2 tablets (650 mg) by mouth every 6 hours as needed for mild pain Start after Delivery.  Quantity: 100 tablet  Refills: 0     ibuprofen 600 MG tablet  Commonly known as: ADVIL/MOTRIN  Used for: S/P  section      Dose: 600 mg  Take 1 tablet (600 mg) by mouth every 6 hours as needed for moderate pain Start after delivery  Quantity: 60 tablet  Refills: 0     oxyCODONE 5 MG tablet  Commonly known as: ROXICODONE  Used for: S/P  section      Dose: 5 mg  Take 1 tablet (5 mg) by mouth every 6 hours as needed for breakthrough pain  Quantity: 12 tablet  Refills: 0     senna-docusate 8.6-50 MG tablet  Commonly known as: SENOKOT-S/PERICOLACE  Used for: S/P  section      Dose: 1 tablet  Take 1 tablet by mouth daily Start after delivery.  Quantity: 100 tablet  Refills: 0        CONTINUE these medicines which have NOT CHANGED      Dose / Directions   azelastine 0.1 % nasal spray  Commonly known as: ASTELIN  Used for: Allergic rhinitis due to dust mite      Dose: 2 spray  Spray 2 sprays into both nostrils 2 times daily as needed for rhinitis  Quantity: 30 mL  Refills: 3     cetirizine 10 MG tablet  Commonly known as: zyrTEC      Dose: 1 tablet  Take 1 tablet by mouth daily  Refills: 0      EPINEPHrine 0.3 MG/0.3ML injection 2-pack  Commonly known as: ANY BX GENERIC EQUIV  Used for: Allergic rhinitis caused by mold, Seasonal allergic rhinitis due to pollen, Allergic rhinitis due to animals, Allergic rhinitis due to dust mite      Dose: 0.3 mg  Inject 0.3 mLs (0.3 mg) into the muscle as needed for anaphylaxis  Quantity: 0.6 mL  Refills: 3     fluticasone 50 MCG/ACT nasal spray  Commonly known as: FLONASE  Used for: Allergic rhinitis due to dust mite      Dose: 2 spray  Spray 2 sprays into both nostrils daily  Quantity: 16 g  Refills: 3     ORDER FOR ALLERGEN IMMUNOTHERAPY  5 mL vial  Used for: Seasonal allergic rhinitis due to pollen      Name of Mix: Mix #1  Tree , Weeds  Birch Mix PRW 1:20 w/v, HS  0.5 ml  Boxelder-Maple Mix BHR (Boxelder Hard Red) 1:20 w/v, HS  0.5 ml  Romney, Common 1:20 w/v, HS  0.5 ml  Pine, White 1:20 w/v, ALK 0.5 ml  Kochia 1:20 w/v, HS 0.5 ml  Plantain, English 1:20 w/v, HS 0.5 ml  Ragweed Mixed 1:20 w/v ALK  0.5 ml  Diluent: HSA qs to 5ml  Quantity: 5 mL  Refills: PRN     ORDER FOR ALLERGEN IMMUNOTHERAPY  5 mL vial  Used for: Seasonal allergic rhinitis due to pollen, Allergic rhinitis due to dust mite, Allergic rhinitis due to animals      Name of Mix: Mix #2  Dust Mite, Cat, Dog, Grass  Cat Hair, Standardized 10,000 BAU/mL, ALK  2.0 ml  Dog Hair-Dander, A. P.  1:100 w/v, HS  1.0 ml  Dust Mites DP. 10,000 AU/mL, HS  0.5 ml   Adis Grass (Std) 100,000 BAU/mL, HS 0.3 ml  Diluent: HSA qs to 5ml  Quantity: 5 mL  Refills: PRN     ORDER FOR ALLERGEN IMMUNOTHERAPY  5 mL vial  Used for: Allergic rhinitis caused by mold      Name of Mix: Mix #3  Mold  Epicoccum Nigrum 1:10 w/v, HS 0.5 ml  Diluent: HSA qs to 5ml  Quantity: 5 mL  Refills: PRN     PRENATAL VITAMIN PO      Dose: 1 tablet  Take 1 tablet by mouth daily  Refills: 0     SUMAtriptan 50 MG tablet  Commonly known as: Imitrex  Used for: Other migraine without status migrainosus, not intractable      Dose: 50 mg  Take 1  tablet (50 mg) by mouth at onset of headache for migraine  Quantity: 10 tablet  Refills: 3        STOP taking    aspirin 325 MG EC tablet  Commonly known as: ASA        fexofenadine 180 MG tablet  Commonly known as: ALLEGRA              Where to get your medicines      These medications were sent to [facility] [place], [place] - [address]  [address] [facility], [facility] [address]    Phone: [phone]     acetaminophen 325 MG tablet    ibuprofen 600 MG tablet    oxyCODONE 5 MG tablet    senna-docusate 8.6-50 MG tablet               Consultations:   - Anesthesia  - NICU          Brief Admission History:   Reanna Gilbert is a 36 year old  at 37w2d by LMP c/w 11w1d US who presents today for induction of labor in the setting of fetal Tetrology of Fallot and 22q11 microdeletion.       Intraoperative course   The procedure was uncomplicated.   mL.  See operative report for details.     Findings:   1. No subcutaneous scarring, no rectofascial adhesions, no intraabdominal adhesions, no adhesions between bladder and lower uterine segment  2. Clear amniotic fluid  3. Liveborn female infant in PRAVEEN presentation. Born at 0141 on 22. No nuchal cord. Apgars 7 at 1 minute & 8 at 5 minutes. Weight pending.  4. Normal uterus, fallopian tubes, and ovaries.   5. Cord gasses arterial pH 7.16, BE -9.0, venous pH 7.24, BE -5.6         Postpartum Course   The patient's hospital course was notable for contact dermatitis, likely secondary to the  drape. The dermatitis improved with topical hydrocortisone.  She otherwise recovered as anticipated and experienced no post-operative complications. On discharge, her pain was well controlled. Vaginal bleeding is similar to peak menstrual flow.  Voiding without difficulty.  Passing flatus.  Ambulating well and tolerating a normal diet.  No fever or significant wound drainage.  Breastfeeding well.  Infant is stable.  She was discharged on  post-partum day #3.    Post-partum hemoglobin:   Hemoglobin   Date Value Ref Range Status   07/23/2022 11.2 (L) 11.7 - 15.7 g/dL Final   04/27/2020 15.3 11.7 - 15.7 g/dL Final             Discharge Instructions and Follow-Up:     1) Diet: Regular  2) Feeding: Breast  3) Contraception: Discuss at 6 weeks  4) Activity: No lifting greater than 20 lbs, pushing, pulling, or other strenuous activity for 6 weeks. Pelvic rest for 6 weeks including no sexual intercourse, tampons, or douching. No driving until you can slam on the breaks without pain or while on narcotic pain medications.   5) Precautions: Call for temperature > 100.4, bright red vaginal bleeding >1 pad an hour x 2 hours, foul smelling vaginal discharge, pain not controlled by usual oral pain meds, persistent nausea and vomiting not controlled on medications, drainage or redness from incision site  6) Follow-up:  - Primary OB in 6 weeks for routine postpartum visit         Discharge Disposition:   Discharge to home.    Discharge staff: Dmitry Ochoa MD  OB/GYN, PGY-3  07/25/2022, 7:54 AM     The patient was seen and examined by me on the day of discharge.  I have reviewed and agree with the resident's above note.    Sharon Andres MD, FACOG

## 2022-07-22 NOTE — PROGRESS NOTES
Patient arrived to [facility] unit via zoom cart at 0500,with belongings, accompanied by spouse/ significant other, with infant in NICU. Received report from ADDI Ku and checked bands. Unit and room orientation started. Call light given; no concerns present at this time. Continue with plan of care.

## 2022-07-22 NOTE — ANESTHESIA CARE TRANSFER NOTE
Patient: Reanna Gilbert    Procedure: Procedure(s):   SECTION       Diagnosis: * No pre-op diagnosis entered *  Diagnosis Additional Information: No value filed.    Anesthesia Type:   Epidural     Note:    Oropharynx: oropharynx clear of all foreign objects  Level of Consciousness: awake  Oxygen Supplementation: room air    Independent Airway: airway patency satisfactory and stable  Dentition: dentition unchanged      Patient transferred to: Labor and Delivery    Handoff Report: Identifed the Patient, Identified the Reponsible Provider, Reviewed the pertinent medical history, Discussed the surgical course, Reviewed Intra-OP anesthesia mangement and issues during anesthesia, Set expectations for post-procedure period and Allowed opportunity for questions and acknowledgement of understanding      Vitals:  Vitals Value Taken Time   BP     Temp     Pulse     Resp     SpO2         Electronically Signed By: Ashley Reynaga MD  2022  2:43 AM

## 2022-07-22 NOTE — ANESTHESIA PROCEDURE NOTES
Intrathecal injection Procedure Note    Pre-Procedure   Staff -        Anesthesiologist:  Ashley Reynaga MD       Resident/Fellow: Emma Parkinson MD       Performed By: with residents       Procedure performed by resident/fellow/CRNA in presence of a teaching physician.         Location: OB       Procedure Start/Stop Times: 7/22/2022 1:19 AM       Pre-Anesthestic Checklist: patient identified, IV checked, risks and benefits discussed, informed consent, monitors and equipment checked, pre-op evaluation, at physician/surgeon's request and post-op pain management  Timeout:       Correct Patient: Yes        Correct Procedure: Yes        Correct Site: Yes        Correct Position: Yes   Procedure Documentation  Procedure: intrathecal injection       Patient Position: sitting       Skin prep: Chloraprep       Insertion Site: L3-4. (midline approach).       Needle Gauge: 25.        Needle Length (Inches): 3.5        Spinal Needle Type: Pencan       Introducer used       Introducer: 20 G       # of attempts: 3 and  # of redirects:  3    Assessment/Narrative         Paresthesias: Yes.       Sensory Level: T5       CSF fluid: clear.       Opening pressure was cmH2O while  Sitting.      Medication(s) Administered   0.25% PF Bupivacaine (Intrathecal) - Intrathecal   1.4 mL - 7/22/2022 1:19:00 AM  Fentanyl PF (Intrathecal) - Intrathecal   15 mcg - 7/22/2022 1:19:00 AM  Morphine PF 1 mg/mL (Intrathecal) - Intrathecal   0.15 mg - 7/22/2022 1:19:00 AM  Medication Administration Time: 7/22/2022 1:19 AM     Comments:  T5b on R T4 on L

## 2022-07-22 NOTE — PLAN OF CARE
VSS and postpartum assessments WDL, ex has not voided since bedoya removal at 1545 (due to void by 1945).  Up with minimal assistance; slow, but steady gait; brushed teeth, ambulated independently around the room. Visited infant daughter, Maren, in the NICU. Pumped and got some drops on the right, and a mist on the left; collected and brought to infant.  Pain managed with tylenol and ibuprofen.   Her partner, Rigo is present and very supportive.  (Of note, she works as a radiation tech in cardiac and he works as a respiratory therapist).    Will continue with postpartum cares and education per plan of care.

## 2022-07-22 NOTE — OP NOTE
[facility]  Operative Note     Surgery Date:  2022  Surgeon:  Sharon Andres MD  Assistants:  Amanda Cho MD, PGY-2    Pre-op Diagnosis:    - Intrauterine pregnancy at 37w4d  - Fetal Tetrology of Fallot   - Fetal 22q11 microdeletion   - fetal growth restriction  -Category II fetal heart rate tracing remote from delivery      Post-op Diagnosis:    - Same   - Liveborn female infant     Procedure:    - Primary low-transverse  section with two layer uterine closure via pfannenstiel incision    Anesthesia:  Spinal  QBL:    483 mL  IVF:    500 mL crystalloid  UOP:    100 mL clear urine at the end of the case  Drains:   Bedoya Catheter   Specimens:   Routine cord blood/segment, placenta  Antibiotics:  2g Ancef, 500mg Azithromycin  Additional medications: None  Complications:  None    Indications:   Reanna Gilbert is a 36 year old  at 37w2d who presents today for induction of labor in the setting of fetal Tetrology of Fallot,  22q11 microdeletion and fetal growth restriction. She received misoprostol and bedoya for cervical ripening. She received pitocin and AROM for augmentation. She developed a category 2 fetal heart rate tracing remote from delivery and given concern for fetal status, a primary  section was recommended. The risks, benefits, and alternatives of  section were discussed with the patient, and she agreed to proceed.     Findings:   1. No subcutaneous scarring, no rectofascial adhesions, no intraabdominal adhesions, no adhesions between bladder and lower uterine segment  2. Clear amniotic fluid  3. Liveborn female infant in PRAVEEN presentation. Born at 0141 on 22. No nuchal cord. Apgars 7 at 1 minute & 8 at 5 minutes. Weight pending.  4. Normal uterus, fallopian tubes, and ovaries.   5. Cord gasses arterial pH 7.16, BE -9.0, venous pH 7.24, BE -5.6    Procedure Details:   The patient was brought to the OR, where adequate spinal anesthesia  was administered. She was placed in the dorsal supine position with a slight leftward tilt. A bedoya catheter had been placed previously.  She was prepped and draped in the usual sterile fashion. A surgical time out was performed. A pfannenstiel skin incision was made with the scalpel, and carried down to the underlying fascia with sharp and blunt dissection. The fascia was incised in the midline, and the incision was extended laterally with the De Leon scissors. The superior aspect of the fascia was grasped with the Kocher clamps and dissected off of the underlying rectus muscles with blunt and sharp dissection. Attention was then turned to the inferior aspect of the fascia, which was similarly dissected off of the underlying rectus muscles. The rectus muscles were  in the midline, and the peritoneum was entered.  The opening was extended with good visualization of the bladder. The bladder blade was placed. The vesicouterine peritoneum was incised in the midline, and the incision was extended laterally with the Metzenbaum scissors. A bladder flap was created and the bladder blade was replaced. A transverse hysterotomy was made with the scalpel in the lower uterine segment, and the incision was extended with digital pressure. The infant was noted to be in the PRAVEEN position, and was delivered atraumatically. The shoulders delivered easily. No nuchal cord was noted. The cord was doubly clamped and cut after 60 seconds, and the infant was handed off to the awaiting NICU team.  Cord gasses were collected. The placenta was delivered with gentle traction on the umbilical cord and uterine massage.  The uterus was exteriorized and cleared of all clots and debris. Uterine tone was noted to be firm with 30 units of pitocin given through the running IV and uterine massage. The hysterotomy was closed with a running locked suture of 0 Vicryl. The hysterotomy was then imbricated using an 0 Vicryl suture. The hysterotomy was  noted to be hemostatic. The posterior cul-de-sac was cleared of all clots and debris. The uterus was returned to the abdomen. The pericolic gutters were cleared of all clots and debris. The hysterotomy was reexamined and noted to be hemostatic.  The fascia and rectus muscles were examined and areas of oozing were controlled with electrocautery. The fascia was closed with a running 0 Vicryl suture. The subcutaneous tissue was irrigated and areas of oozing were controlled with electrocautery. The subcutaneous tissue was closed with a running suture of 3-0 Vicryl. The skin was closed with a running subcuticular 4-0 Monocryl suture and covered with skin glue.    All sponge, needle, and instrument counts were correct. The patient tolerated the procedure well, and was transferred to recovery in stable condition. Dr. Andres was present and scrubbed for the procedure.     Amanda Cho MD  Ob/Gyn Resident, PGY-2  07/22/2022 5:33 AM     I was scrubbed and present for the entire procedure.  I have reviewed and edited the above note.     Sharon Andres MD, FACOG

## 2022-07-22 NOTE — PLAN OF CARE
Vitals sign stable and postpartum checks within normal limits. Pt is sleepy and complain of being tired and nauseous when elevating the head bed. Pt is not drinking enough and had emesis once. Pt assisted to the bathroom for partial bath, tolerated it well with no problem. Pt was burping a lot, but no emesis. No pumping yet because pt is not feel good. Denies pain. Pain is well managed with Tylenol and Toradol. Medicated pt with Reglan and Zofran for nausea. Encouraged to take sips of ginger Ale with ice chips. Reassessed pt for nausea and pt stated, that the nausea is resolved. Continue cares and monitor pt for nausea.

## 2022-07-22 NOTE — PROGRESS NOTES
Anesthesia Postpartum  Section with Spinal Anesthesia    Patient: Reanna Gilbert    Patient location: Postpartum floor    Chief complaint: Acute postoperative pain management s/p spinal anesthetic.    Procedure(s) Performed:  Procedure(s):   SECTION    Anesthesia type: Epidural    Subjective  Resting comfortably at this time. Pain adequately controlled by PO medications. Denies pruritis, weakness, paresthesias, difficulties breathing or voiding, headache, nausea, or vomiting. She is able to ambulate and tolerates a regular diet.     Objective  Respiratory Function (RR / SpO2 / Airway Patency): Satisfactory  Cardiac Function (HR / Rhythm / BP): Satisfactory  Strength and sensation lower extremities: Normal  Site of spinal/epidural insertion: No signs of infection or inflammation    Most recent vitals  /77 (BP Location: Right arm, Patient Position: Semi-Jean's, Cuff Size: Adult Regular)   Pulse 77   Temp 36.9  C (98.4  F) (Oral)   Resp 16   LMP 2021   SpO2 99%   Breastfeeding Unknown     Assessment and plan  Reanna Gilbert is a 36 year old female  POD #1 s/p No admission procedures for hospital encounter. with intrathecal 0.75% bupivacaine (1.5mg), fentanyl (15mcg), and morphine (150mcg) and single shot TAP nerve block injections with bupivacaine 0.25% 10mL and long acting liposome bupivacaine (Exparel) 1.3% bilaterally. She is ambulating without difficulty without weakness or paresthesias. There is no evidence of adverse side effects associated with spinal or fascial plane block injections. The patient is receiving adequate incisional pain control at this time and anticipate up to 72 hours of incisional pain control. However, we further anticipate that the patient may require opioid and non-opioid analgesics for visceral and muscle pain that is not controlled with local anesthetic.      In brief summary, her postoperative analgesia is adequately controlled today.  Further interventional analgesic strategies would be of little utility at this time. Thus, we recommend proceeding with PO analgesics including staggered dosing of NSAIDs and acetaminophen with a taper of oxycodone.     Thank you for including us in the care for this patient. If there are any concerns please contact the department of anesthesia OB division ([phone]    Betty Wu MD CA-2   Department of Anesthesiology  [phone]

## 2022-07-22 NOTE — PROGRESS NOTES
Data: Reanan Gilbert transferred to [facility] via wheelchair at 0450. Stopped by NICU to visit baby, patient began to feel nauseous and vomited while in NICU. Called anesthesia at 0457. Recommended to elevate legs. Will continue to monitor blood pressures. Presley to remain in place until 0600 per MD.   Action: Receiving unit notified of transfer: Yes. Patient and family notified of room change. Report given to April  at 0354. Updated report given at 0500. VSS stable at time of transfer,  Belongings sent to receiving unit. Accompanied by Registered Nurse. Oriented patient to surroundings. Call light within reach.    Response: Patient tolerated transfer and is stable.

## 2022-07-22 NOTE — ANESTHESIA POSTPROCEDURE EVALUATION
Patient: Reanna Gilbert    Procedure: Procedure(s):   SECTION       Anesthesia Type:  Epidural    Note:  Disposition: Inpatient   Postop Pain Control: Uneventful            Sign Out: Well controlled pain   PONV: No   Neuro/Psych: Uneventful            Sign Out: Acceptable/Baseline neuro status   Airway/Respiratory: Uneventful            Sign Out: Acceptable/Baseline resp. status   CV/Hemodynamics: Uneventful            Sign Out: Acceptable CV status; No obvious hypovolemia; No obvious fluid overload   Other NRE: NONE   DID A NON-ROUTINE EVENT OCCUR? No     Epidural-to- Updated ASA: 2      Last vitals:  Vitals:    22 2200 22 2350 22   BP:   103/59   Pulse:      Resp: 16  16   Temp: 36.8  C (98.3  F)  37.2  C (98.9  F)   SpO2:  99%        Electronically Signed By: Ashley Reynaga MD  2022  2:44 AM

## 2022-07-22 NOTE — CONSULTS
[facility]  MATERNAL CHILD HEALTH   INITIAL NICU PSYCHOSOCIAL ASSESSMENT     DATA:     Presenting Information: Mom, Reanna, is a 36 year old  who delivered an infant girl , Maren on 22 at 37w4d gestation in the setting of . Baby was admitted to the NICU for , monitoring and management of prenatally known Tetralogy of Fallot and 22q microdeletion. SW was consulted to meet with this family per NICU admission of infant.     Living Situation: Parents, Reanna and Toni, are  and live together in [place], [place].    Social Support: They report having a good social support network who are readily available and involved.    Education/Employment: Both parents are employed full time. Mother is a radiology technician and father is a respiratory therapist and they will have parental leaves.     Insurance: Mother has [employer] and father has [employer].    Follow up clinic: Unsure at this time.     Baby Supplies: Family shares they have obtained the necessary baby supplies.     Transportation: No concerns. Family is aware they are able to buy a discounted parking pass.    Source of Financial Support: parental employment    Mental Health History: No changes in mood or coping noted throughout pregnancy. Thorough mental health history  not obtained at this time due to mothers medical state (see coping).     History of Postpartum Mood Disorders: This is mothers first child.    Chemical Health History: None reported and not indicated in the chart.     INTERVENTION:       Chart review    Conducted brief Psychosocial Assessment    Introduction to Maternal Child Health SW role and scope of practice    Orientation to the NICU (parking, lodging, meals, visitation)    Validated emotions and provided supportive listening    Provided psychoeducation on  mood disorders and indicated that SW would continue to monitor mood and support bridging to mental health  resources as needed.    Provided SW contact info    ASSESSMENT:     Coping: Sw met with parents at the bedside on the  family care center. Family is known to this sw through the [facility] clinic. Mother reported feeling fatigued and nauseous and was observed to be falling asleep throughout brief assessment. She reports sadness in not being able to visit Maren due to her sickness. Sw shared further about the [employer] [facility] and parents report they will reach out to the nurse should they be interested. Father asked questions about visiting policies and parking but did not have additional questions or concerns related to the NICU at this time.     Assessment of parental risk for PMAD: Higher than average risk, considering medically complexity.    Risk Factors: first time parents, distance from home (40 miles) and hospitalization during a global pandemic    Resiliency Factors & Strengths: strong social support, parental employment, stable housing, reliable transportation, able and willing to ask for help/accept help, able and willing to ask advocate for self/baby and demonstrated ability to integrate new information     PLAN:     SW will continue to follow for supportive intervention.    JULIUS Rooney  Maternal Child Health   Phone: [phone]  Pager: [phone]  After hours pager: [phone]

## 2022-07-22 NOTE — PLAN OF CARE
"Goal Outcome Evaluation:        Pt vss. Pit finishing up. Incision appears to be healing well. Pain managed. Pt had some nausea and vomiting before coming to unit, stable now and getting rest. Bedoya in place, low output. Verbal \"okay\" from resident to keep bedoya in place per nurses discretion until adequate output. Significant other present. Will continue with plan of care.                   "

## 2022-07-22 NOTE — PROGRESS NOTES
Pt to PACU via cart.  VSS, Pitocin infusing to piv without complications, bedoya to gravity with clear, yellow colored urine. denies pain and nausea treated with zofran, dressing CDI.  I)IV to pump, compression to pneumoboots re-started.  A) Stable P) pt to inform RN if she experiences pain or nausea.  Post op cares.

## 2022-07-23 LAB — HGB BLD-MCNC: 11.2 G/DL (ref 11.7–15.7)

## 2022-07-23 PROCEDURE — 36415 COLL VENOUS BLD VENIPUNCTURE: CPT | Performed by: STUDENT IN AN ORGANIZED HEALTH CARE EDUCATION/TRAINING PROGRAM

## 2022-07-23 PROCEDURE — 85018 HEMOGLOBIN: CPT | Performed by: STUDENT IN AN ORGANIZED HEALTH CARE EDUCATION/TRAINING PROGRAM

## 2022-07-23 PROCEDURE — 250N000013 HC RX MED GY IP 250 OP 250 PS 637: Performed by: STUDENT IN AN ORGANIZED HEALTH CARE EDUCATION/TRAINING PROGRAM

## 2022-07-23 PROCEDURE — 120N000002 HC R&B MED SURG/OB UMMC

## 2022-07-23 RX ADMIN — ACETAMINOPHEN 975 MG: 325 TABLET, FILM COATED ORAL at 01:07

## 2022-07-23 RX ADMIN — ACETAMINOPHEN 975 MG: 325 TABLET, FILM COATED ORAL at 20:08

## 2022-07-23 RX ADMIN — IBUPROFEN 800 MG: 800 TABLET, FILM COATED ORAL at 04:28

## 2022-07-23 RX ADMIN — ACETAMINOPHEN 975 MG: 325 TABLET, FILM COATED ORAL at 13:01

## 2022-07-23 RX ADMIN — IBUPROFEN 800 MG: 800 TABLET, FILM COATED ORAL at 22:57

## 2022-07-23 RX ADMIN — IBUPROFEN 800 MG: 800 TABLET, FILM COATED ORAL at 16:43

## 2022-07-23 RX ADMIN — SENNOSIDES AND DOCUSATE SODIUM 2 TABLET: 50; 8.6 TABLET ORAL at 08:19

## 2022-07-23 RX ADMIN — IBUPROFEN 800 MG: 800 TABLET, FILM COATED ORAL at 10:37

## 2022-07-23 RX ADMIN — HYDROCORTISONE: 1 CREAM TOPICAL at 11:05

## 2022-07-23 RX ADMIN — ACETAMINOPHEN 975 MG: 325 TABLET, FILM COATED ORAL at 07:08

## 2022-07-23 RX ADMIN — HYDROCORTISONE: 1 CREAM TOPICAL at 22:57

## 2022-07-23 ASSESSMENT — ACTIVITIES OF DAILY LIVING (ADL)
ADLS_ACUITY_SCORE: 18
ADLS_ACUITY_SCORE: 20
ADLS_ACUITY_SCORE: 20
ADLS_ACUITY_SCORE: 18
ADLS_ACUITY_SCORE: 20
ADLS_ACUITY_SCORE: 18
ADLS_ACUITY_SCORE: 20
ADLS_ACUITY_SCORE: 20
ADLS_ACUITY_SCORE: 18

## 2022-07-23 NOTE — PROVIDER NOTIFICATION
07/22/22 2025   Provider Notification   Provider Name/Title Dr. Ramesh   Method of Notification Electronic Page   Request Evaluate-Remote   Notification Reason Medication Request   Pt c/o itching and has some rashes over abdomen and back, pls order med for itching/rash. Thanks.

## 2022-07-23 NOTE — PROGRESS NOTES
Obstetrics Postpartum Progress Note  DOS: 22  MRN: [id number]    POD#1 after PLTCS     S: Patient states she is doing well.  Has no pain currently. Lochia less than menses.  Eating and drinking without nausea/vomiting.  She is  passing flatus. Ambulating without difficulty; initially had some dizziness with ambulation but this has improved. Denies abdominal pain, shortness of breath.  She is voiding without difficulty. Planning on breastfeeding. Baby in the NICU. No other acute concerns.   O:  Vitals:    22 1630 22 2041 22 0050 22 0430   BP: 114/75 105/66 122/80 109/71   BP Location: Right arm Right arm Right arm Right arm   Patient Position: Semi-Jean's Semi-Jean's Semi-Jean's Semi-Jean's   Cuff Size: Adult Regular Adult Regular Adult Regular Adult Regular   Pulse: 75 69 102 69   Resp: 16 17 17 16   Temp: 97.6  F (36.4  C) 98.5  F (36.9  C) 97.9  F (36.6  C) 97.5  F (36.4  C)   TempSrc: Oral Oral Oral Oral   SpO2:  100% 99% 98%         Gen:  NAD, sleeping comfortably   CV: Regular rate, well perfused  Resp: Nonlabored on room air, normal inspiratory effort  Abd: soft, nondistended, nontender, fundus firm at 2 cm below umbilicus. abdominal binder in place   Incision:  clean, dry, intact    Urine output: 1650 last 24 h    Weight:   There were no vitals filed for this visit.      Labs:  Hemoglobin   Date Value Ref Range Status   2022 11.2 (L) 11.7 - 15.7 g/dL Final   2022 12.0 11.7 - 15.7 g/dL Final   2020 15.3 11.7 - 15.7 g/dL Final       No results found for: CHACORTA    Assessment and Plan: 36 year old  POD#1 s/p PLTCS, doing well postpartum. 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 Pregnancy was otherwise notable for AMA status, FGR and mild polyhydramnios. Baby in NICU. No other concerns.     Routine postpartum:    Heme: Hgb 12.0 >  > 11.2. No symptoms of ABLA. Will discharge home w/ PO iron if Hgb  under 10.    Pain: well-controlled with tylenol, ibuprofen. Has not used prn oxycodone prn.  Continue current regimen, oxycodone available.    Rh positive/Rubella immune. No intervention required.    Feeding: breast    :  s/p bedoya, voiding    PPX: Encourage ambulation    Continue regular diet, scheduled bowel regimen, prn antiemetics    Contraception: Would like to discuss this at 6 wk visit.     Dispo: Anticipate discharge home today    Ashley Gracia MD  OB/GYN PGY-1  July 23, 2022 7:10 AM     Appreciate Dr. Gracia's note above, patient also seen and examined by me. I agree with the note above.   Jacquie Jaramillo MD

## 2022-07-23 NOTE — PROVIDER NOTIFICATION
07/22/22 2148   Provider Notification   Provider Name/Title Dr. Ramesh   Method of Notification Electronic Page   Request Evaluate-Remote   Notification Reason Medication Request   Pt requesting for Benadryl d/t itching all over body. Thanks.

## 2022-07-23 NOTE — PLAN OF CARE
Pt is stable. Pt showered and is ambulating more in the room. Passing gas and did have a bowel movement. Incision is in tact, clean and dry. Has some rashes and red areas on the abdomen, but not itching again after the shower per pt. Pumping regularly today and getting some on the flanges, but up to 5 ml with hand expression right after pumping.  Pt visiting baby in NICU on a wheelchair. Vital sign stable and assessment with in normal limits. Complains of dull and aching pain around the incision. Pt was medicated with Ibuprofen and Tylenol for pain control. Applied the hydrocortisone on the abdomen. Pt was made aware of the Roxicodone if needed. Assisted pt with hand expression and encouraged pumping every 2-3 hours.  Continue cares and assist as needed with hand expression.

## 2022-07-23 NOTE — LACTATION NOTE
"This note was copied from a baby's chart.  D:  I met with Reanna and Cuco; Maren is their 1st baby.  She is normally in good health, takes no medications, and has no history of breast/chest surgery or trauma.  She has already started to pump.   I:  I gave her a folder of introductory materials and went over pumping guidelines.  I reviewed physiology of colostrum and milk production, pumping guidelines, and I gave her a log and encouraged her to use it.   I explained how to access the videos \"Hand Expression\" and \"Maximizing Milk Production\"; as well as other helpful books and websites.   We discussed hands-on pumping techniques and usefulness of a hands-free pumping bra.  We discussed skin to skin holding and how to reach your breastfeeding goals.  We talked about birth control and other medications during breastfeeding.  She verbalized understanding via teachback.  I advised her to call her insurance company about pump coverage.    A:  Mom has information she needs to initiate her supply.   P:  Will continue to provide lactation support.    Vielka Kuo, RNC, IBCLC            "

## 2022-07-23 NOTE — PLAN OF CARE
Patient taking Tylenol and Ibuprofen for pain control. Incision with liquid bandage, open to air. Patient ambulating in room with minimal assistance, voiding spontaneously and pumping milk for  in NICU, assisted with hand expression. Patient has itching and rashes on abdomen and on lower back, MD was paged and Hydrocortisone cream was ordered, patient requested for Benadryl and Benadry IV was ordered, offered and patient stated she was good for now. Will continue with plan of care.

## 2022-07-24 PROCEDURE — 250N000013 HC RX MED GY IP 250 OP 250 PS 637: Performed by: STUDENT IN AN ORGANIZED HEALTH CARE EDUCATION/TRAINING PROGRAM

## 2022-07-24 PROCEDURE — 120N000002 HC R&B MED SURG/OB UMMC

## 2022-07-24 RX ORDER — OXYCODONE HYDROCHLORIDE 5 MG/1
5 TABLET ORAL EVERY 6 HOURS PRN
Qty: 12 TABLET | Refills: 0 | Status: SHIPPED | OUTPATIENT
Start: 2022-07-24 | End: 2022-08-04

## 2022-07-24 RX ADMIN — ACETAMINOPHEN 975 MG: 325 TABLET, FILM COATED ORAL at 22:28

## 2022-07-24 RX ADMIN — ACETAMINOPHEN 975 MG: 325 TABLET, FILM COATED ORAL at 08:37

## 2022-07-24 RX ADMIN — IBUPROFEN 800 MG: 800 TABLET, FILM COATED ORAL at 05:33

## 2022-07-24 RX ADMIN — ACETAMINOPHEN 975 MG: 325 TABLET, FILM COATED ORAL at 02:47

## 2022-07-24 RX ADMIN — IBUPROFEN 800 MG: 800 TABLET, FILM COATED ORAL at 13:15

## 2022-07-24 RX ADMIN — HYDROCORTISONE: 1 CREAM TOPICAL at 19:37

## 2022-07-24 RX ADMIN — ACETAMINOPHEN 975 MG: 325 TABLET, FILM COATED ORAL at 14:38

## 2022-07-24 RX ADMIN — IBUPROFEN 800 MG: 800 TABLET, FILM COATED ORAL at 19:37

## 2022-07-24 RX ADMIN — HYDROCORTISONE: 1 CREAM TOPICAL at 08:37

## 2022-07-24 ASSESSMENT — ACTIVITIES OF DAILY LIVING (ADL)
ADLS_ACUITY_SCORE: 18

## 2022-07-24 NOTE — PROGRESS NOTES
Obstetrics Postpartum Progress Note  DOS: 22  MRN: [id number]    POD#2 after PLTCS     S: Patient states she is doing well.  Pain is absent with current pain meds.  Eating and drinking without nausea/vomiting.  She is passing flatus and having bowel movements. Ambulating without difficulty.  Voiding without difficulty.  Denies headaches, vision changes, chest pain, SOB.  Breastfeeding. No other acute concerns.   O:  Vitals:    22 0430 22 0819 22 1645 22 0247   BP: 109/71 110/74 116/83 111/70   BP Location: Right arm Right arm Right arm Right arm   Patient Position: Semi-Jean's Semi-Jean's Semi-Jean's Semi-Jean's   Cuff Size: Adult Regular Adult Regular Adult Regular Adult Regular   Pulse: 69 73 83 66   Resp: 16 16 16 17   Temp: 97.5  F (36.4  C) 98.3  F (36.8  C) 98.3  F (36.8  C) 97.7  F (36.5  C)   TempSrc: Oral Oral Oral Oral   SpO2: 98%            Gen:  NAD, sleeping comfortably in bed   CV: Regular rate, well perfused  Resp: Nonlabored on room air, normal inspiratory effort  Abd: soft, nondistended, nontender  Incision:  clean, dry, intact, surgical glue in place  Ext: 1+ LE edema  Skin: light pink macular rash over trunk with clear delineation at upper border. No excoriations. No lesions noted over face or extremities.      Labs:  Hemoglobin   Date Value Ref Range Status   2022 11.2 (L) 11.7 - 15.7 g/dL Final   2022 12.0 11.7 - 15.7 g/dL Final   2020 15.3 11.7 - 15.7 g/dL Final       No results found for: CHACORTA    Assessment and Plan: 36 year old  POD#2 s/p PLTCS, doing well postpartum. 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 Pregnancy was otherwise notable for AMA status, FGR and mild polyhydramnios. Baby in NICU.     Routine postpartum:    Heme: Hgb 12 >  > 11.2. No symptoms of ABLA.     Pain: well-controlled with tylenol and ibuprofen; no oxycodone has been used though it is available prn,  continue.    Rh positive/Rubella immune. No intervention required.    Feeding: breast    :  s/p bedoya, voiding    PPX: Encourage ambulation    Continue regular diet, scheduled bowel regimen, prn antiemetics    Contraception: plans to discuss at 6 wk visit     Macular rash  Covers trunk with clear delineation likely contact dermatitis from OR prep/drape. Pruritis improved with hydrocortisone, per pt, appearance of rash improved today from yesterday.   - hydrocortisone bid, diphenhydramine prn   - continue to monitor    Dispo: Anticipate discharge home today or tomorrow    Ashley Gracia MD  OB/GYN PGY-1  7/24/2022 6:21 AM     Appreciate Dr. Gracia's note above, patient also seen and examined by me. Patient concerned about pumping for infant in the NICU. Will continue to support today.  Possible discharge later today vs tomorrow.  I agree with the note above.   Jacquie Jaramillo MD

## 2022-07-24 NOTE — PLAN OF CARE
Pt is stable. Vital sign stable and assessment within normal limits. Pt showered this morning and is independent with her cares. Pt was down stairs in NICU most of the shift doing skin to skin with baby. Pt is pumping and getting up to 7 ml today. Medicated pt with Ibuprofen and Tylenol for pain control. Continue cares.

## 2022-07-24 NOTE — LACTATION NOTE
This note was copied from a baby's chart.  D:  I met with Rigo Forte and Maren for a feeding demo.  I:  We discussed supportive hold, positioning, latch, breastfeeding patterns and infant driven feeding, breast support and compressions, skin to skin benefits, and timing of pumpings around breastfeedings.  We tried cross cradle and cradle hold, but Maren was content to sleep without feeding cues.  We discussed myriad reasons she might not be ready to eat at this time.  Reanna just pumped 7ml; I reviewed when to move to Maintain.  I reviewed process for getting a pump at discharge.  I reviewed how to get lactation support when at CVICU.  A:  Pleasant non-nutritive time at breast.  P:  Will continue to provide lactation support.    Vielka Kuo, RNC, IBCLC

## 2022-07-24 NOTE — PLAN OF CARE
Goal Outcome Evaluation: improving    Plan of Care Reviewed With: patient, spouse     VSS. Patient taking Tylenol and Ibuprofen for pain control. Patient ambulating in room, voiding freely and using double breast pump independently, visiting  in NICU. No signs of infection noted on incision, with liquid bandage and open to air. Rashes still noted on abdomen. Will continue with plan of care.

## 2022-07-24 NOTE — PLAN OF CARE
Goal Outcome Evaluation:    Plan of Care Reviewed With: patient, spouse     Overall Patient Progress: improving    VSS. Fundus midline, firm, and U/U. Scant lochia. Incision approximated with liquid glue; no drainage noted. Pain adequately managed with scheduled pain meds. Ambulating independently, tolerating regular diet, and voiding without difficulty. Pumping independently. Bonding well with . Continue with plan of care.

## 2022-07-25 VITALS
RESPIRATION RATE: 18 BRPM | SYSTOLIC BLOOD PRESSURE: 123 MMHG | TEMPERATURE: 98.1 F | OXYGEN SATURATION: 100 % | HEART RATE: 73 BPM | DIASTOLIC BLOOD PRESSURE: 79 MMHG

## 2022-07-25 PROCEDURE — 250N000013 HC RX MED GY IP 250 OP 250 PS 637: Performed by: STUDENT IN AN ORGANIZED HEALTH CARE EDUCATION/TRAINING PROGRAM

## 2022-07-25 RX ADMIN — IBUPROFEN 800 MG: 800 TABLET, FILM COATED ORAL at 01:30

## 2022-07-25 RX ADMIN — IBUPROFEN 800 MG: 800 TABLET, FILM COATED ORAL at 07:23

## 2022-07-25 RX ADMIN — ACETAMINOPHEN 975 MG: 325 TABLET, FILM COATED ORAL at 10:29

## 2022-07-25 RX ADMIN — ACETAMINOPHEN 975 MG: 325 TABLET, FILM COATED ORAL at 04:43

## 2022-07-25 ASSESSMENT — ACTIVITIES OF DAILY LIVING (ADL)
ADLS_ACUITY_SCORE: 18

## 2022-07-25 NOTE — PROGRESS NOTES
Obstetrics Postpartum Progress Note  2022    POD#3 after PLTCS     S:   Patient states she is doing well.  Her rash has improved and is present but no longer itchy.  Pain is absent with current pain meds.  Eating and drinking without nausea/vomiting.  She is passing flatus and having bowel movements. Ambulating without difficulty.  Voiding without difficulty.  Denies headaches, vision changes, chest pain, SOB.  Breastfeeding. No other acute concerns.  Feels ready for discharge today.  Her baby just moved to the CVICU, and she plans to go there to stay with her baby.  O:  Vitals:    22 0247 22 0837 22 1749 22 0130   BP: 111/70 115/68 126/83 112/82   BP Location: Right arm Right arm Right arm Right arm   Patient Position: Semi-Jean's Semi-Jean's Semi-Jean's Semi-Jean's   Cuff Size: Adult Regular Adult Regular Adult Regular Adult Regular   Pulse: 66 62 61 73   Resp: 17  16 17   Temp: 97.7  F (36.5  C) 98.2  F (36.8  C) 98.3  F (36.8  C) 97.7  F (36.5  C)   TempSrc: Oral Oral Oral Oral   SpO2:   100%      Gen:  NAD, resting comfortably in bed   CV: Well perfused  Resp: Nonlabored on room air, normal inspiratory effort  Abd: Soft, nondistended, nontender  Incision:  clean, dry, intact, surgical glue in place  Ext: 1+ LE edema  Skin: light pink macular rash over trunk with clear delineation at upper border. No excoriations. No lesions noted over face or extremities.      Labs:  Hemoglobin   Date Value Ref Range Status   2022 11.2 (L) 11.7 - 15.7 g/dL Final   2022 12.0 11.7 - 15.7 g/dL Final   2020 15.3 11.7 - 15.7 g/dL Final       Assessment and Plan: 36 year old  POD#3 s/p PLTCS, doing well postpartum. 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 Pregnancy was otherwise notable for AMA status, FGR and mild polyhydramnios. Baby in NICU.     # Routine postpartum:  Heme: Hgb 12 >  > 11.2. No symptoms of ABLA.    Pain: well-controlled with tylenol and ibuprofen; no oxycodone has been used though it is available prn, continue.  Rh positive/Rubella immune. No intervention required.  Feeding: breast  :  s/p bedoya, voiding  Baby: stable in CVICU  PPX: Encourage ambulation  Continue regular diet, scheduled bowel regimen, prn antiemetics  Contraception: plans to discuss at 6 wk visit     # Contact dermatitis  - Distribution consistent with OR prep/drape  - Pruritis improved with hydrocortisone  - Hydrocortisone bid, diphenhydramine prn   - Continue to monitor    Dispo: Discharge home today with 6 week postpartum follow-up    Keli Ochoa MD  OB/GYN, PGY-3  07/25/2022, 6:43 AM     /82 (BP Location: Right arm, Patient Position: Semi-Jean's, Cuff Size: Adult Regular)   Pulse 73   Temp 97.7  F (36.5  C) (Oral)   Resp 17   LMP 11/01/2021   SpO2 100%   Breastfeeding Unknown   Hemoglobin   Date Value Ref Range Status   07/23/2022 11.2 (L) 11.7 - 15.7 g/dL Final     The patient was seen and examined by me separately from the team.  I have reviewed and agree with the above note.  She is doing well today with no concerns, ready to discharge to boarding in the PICU.  She plans to do her postpartum follow up with her primary OB.  Discharge today as above.     Sharon Andres MD, FACOG

## 2022-07-25 NOTE — PLAN OF CARE
Goal Outcome Evaluation:    Plan of Care Reviewed With: patient, spouse     Overall Patient Progress: improving    VSS. Fundus midline, firm. Scant lochia. Incision approximated with liquid glue; no drainage noted. Pain adequately managed with scheduled pain meds. Ambulating independently, tolerating regular diet, and voiding without difficulty. Pumping independently. Visiting  in NICU. Continue with plan of care.

## 2022-07-25 NOTE — PLAN OF CARE
VSS. Fundus firm without massage at level of umbilicus. Incision with liquid bandage, dry and no signs of infection noted. Rashes still observed on abdomen. Patient ambulating in room without difficulty, voiding freely and pumping milk for . Will continue with plan of care.

## 2022-07-25 NOTE — PLAN OF CARE
Goal Outcome Evaluation:    Plan of Care Reviewed With: patient     Overall Patient Progress: improving    VSS. Incisional pain well controlled with Tylenol and Ibuprofen. Pt pumping and hand expressing for infant in the CVICU. Pt tolerating regular diet, ambulating in room, voiding without difficulty and had loose stools last evening (and so stool softeners held this morning). Postpartum checks WNL. Incision well approximated. Pt ready for discharge today. Discharge instructions and medications reviewed with patient. Pt verbalized understanding. Pt discharged to home at 1045. Pt will be boarding with infant on the CVICU.

## 2022-07-26 ENCOUNTER — LACTATION ENCOUNTER (OUTPATIENT)
Age: 37
End: 2022-07-26

## 2022-07-26 NOTE — LACTATION NOTE
This note was copied from a baby's chart.  Spoke briefly with SLP this morning, made plan to return later this afternoon when parents here to support with latching. Met with Reanna this afternoon, Rigo offering bottle feed upon arrival though parents share she's been sleepy and not too interested in feeds. They attempted at breast twice and today she's taking small amounts (5 mL) from bottle, remainder via OG.     Discussed trial of nipple shield for feeds if Maren not able to sustain latch or draw milk easily. Education on rationale for helping to lower suction pressure she would need to remove milk from breast and provide oral stimulation to roof of mouth for latching. Fit Reanna with 20 mm nipple shield and demo how to apply with good fit, she return demo. Talked through and simulated demo for latching baby on with all of shield tip in her mouth. Left both a 20 mm shield and 24 mm shield to use PRN if discomfort with smaller one. Plan to check in tomorrow (lactation here 3-11 pm Wednesday and 5 am - 1 pm Friday this week) can support with latching using nipple shield PRN.     Reanna changed overnight to Maintain function of pump, this morning had her max so far with 45 mL out with pumping; now feels difference with breasts dumont before and softer after pumps. She endorses slight discomfort, discussed fit of flanges and suction levels, adjusting to lower suction if discomfort or reddened areola at base of pump shield with each pull of pump. She describes and feels flange is good fit, encouraged trial of coconut oil prior to pumping. Discussed options for hands free pumping, benefit of using hands for massage/expression, resources such as kelly[employer].com and [employer] (she has lactation resource list in folder, just hadn't looked at it yet).     Would like to return at time of pumping to check fit and support with breastfeeding both with and without nipple shield, will try to connect tomorrow or Friday for  this. Gave mom our ascom number to call on days we are in house, and office number to leave message at other times if questions or desire return visit.

## 2022-07-27 LAB
PATH REPORT.COMMENTS IMP SPEC: NORMAL
PATH REPORT.COMMENTS IMP SPEC: NORMAL
PATH REPORT.FINAL DX SPEC: NORMAL
PATH REPORT.GROSS SPEC: NORMAL
PATH REPORT.MICROSCOPIC SPEC OTHER STN: NORMAL
PATH REPORT.RELEVANT HX SPEC: NORMAL
PHOTO IMAGE: NORMAL

## 2022-07-29 ENCOUNTER — TELEPHONE (OUTPATIENT)
Dept: MATERNAL FETAL MEDICINE | Facility: CLINIC | Age: 37
End: 2022-07-29

## 2022-07-29 NOTE — TELEPHONE ENCOUNTER
Attempted to call Reanna as f/u to hospital discharge. No answer, no VM box set up. Needs routine PPV w/ [facility] or [facility].

## 2022-08-02 ENCOUNTER — LACTATION ENCOUNTER (OUTPATIENT)
Age: 37
End: 2022-08-02

## 2022-08-03 ENCOUNTER — LACTATION ENCOUNTER (OUTPATIENT)
Age: 37
End: 2022-08-03

## 2022-08-03 NOTE — LACTATION NOTE
"This note was copied from a baby's chart.  Met with family this afternoon at bedside, Reanna shares her milk supply continues to increase now getting about 80 mL per pump 8 to 10 times per day. She's noticed some discomfort with pumping, has been alternating use sometimes with the 24 mm pump flanges and sometimes with the 27 mm size.  We discussed pump fit again, erlinda diagram to illustrate nipple moving freely in pump tunnel and areolae staying back; reviewed pump fit relation to milk removal can make a difference too. Reanna had just pumped prior to arrival, so encouraged to call tomorrow after 3 pm (or daytime Thursday or Friday) to request LC visit at time she will be pumping so we can check fit.      Maternal breast exam: soft, symmetric with everted nipples bilaterally, both have yellow crusting across face of nipple (fully covered on face of right, partially on left). No erythema surrounding, no itching or drainage. Mom reports discomfort and \"I notice it,\" but says not significant amount of pain. She's intermittently been using lanolin ointment, and right after pumping generally takes one wet paper towel and one dry one to wipe nipples down.     Encouraged Reanna to have OB provider do exam, in case yellow crusting could be sign of bacterial infection though reassured by minimal discomfort, normal coloring and lack of inflammation of surrounding nipple and areolar tissue. Discussed some crusting can be seen in women exclusively pumping without consequence, but typically see whitish coloring more than yellow/tan. Recommend avoid wet towel to wash each time after pumping, for now suggest letting drops of milk dry completely on face of nipple prior to putting bra back on. Discussed different options for nipple comfort (some use coconut oil, some olive oil, some lanolin) and encouraged applying prior to pumping versus after, to improve comfort while pumping. Gave mom number for [facility] to see if Karin " HAILEY Nicholas, IBCLC has any openings tomorrow, otherwise she will make appt. with OB provider @ [facility] and do her 2 week check up early with them. Left numbers on whiteboard for parents to call if future needs or questions. Plan for mom to call at time of pumping to check on fit.

## 2022-08-04 ENCOUNTER — PRENATAL OFFICE VISIT (OUTPATIENT)
Dept: OBGYN | Facility: CLINIC | Age: 37
End: 2022-08-04
Attending: NURSE PRACTITIONER
Payer: COMMERCIAL

## 2022-08-04 VITALS
SYSTOLIC BLOOD PRESSURE: 112 MMHG | HEIGHT: 64 IN | HEART RATE: 106 BPM | DIASTOLIC BLOOD PRESSURE: 73 MMHG | WEIGHT: 150.9 LBS | BODY MASS INDEX: 25.76 KG/M2

## 2022-08-04 DIAGNOSIS — N64.4 NIPPLE PAIN: Primary | ICD-10-CM

## 2022-08-04 DIAGNOSIS — Z98.890 POSTOPERATIVE STATE: ICD-10-CM

## 2022-08-04 PROCEDURE — 99207 PR POST PARTUM EXAM: CPT | Performed by: NURSE PRACTITIONER

## 2022-08-04 PROCEDURE — G0463 HOSPITAL OUTPT CLINIC VISIT: HCPCS

## 2022-08-04 ASSESSMENT — PAIN SCALES - GENERAL: PAINLEVEL: NO PAIN (0)

## 2022-08-04 NOTE — NURSING NOTE
SUBJECTIVE:   Reanna Gilbert is here for her 3-week postpartum checkup.  incision check and nipple check  Both nipples are experiencing trauma

## 2022-08-04 NOTE — LACTATION NOTE
This note was copied from a baby's chart.  Reanna called for a visit to check for flange fit. She started pumping with the 24 mm flanges, but her nipples were rubbing. She switched to 27 mm, and the fit appeared to be better and not pulling in her areola.     She was not able to get into Karin Nicholas CNM, IBCLC with [facility] today for an assessment of her nipples as noted in a lactation note from 8/2/22, so she scheduled her 2 week postpartum check for tomorrow morning at [facility] and will have her nipples assessed at that time.

## 2022-08-04 NOTE — PROGRESS NOTES
"Subjective:  Reanna Gilbert is a 36 yr old female, , who presents to clinic today for a 2 week postpartum visit. Reanna had a  2022 at 37w4d. Pregnancy was complicated by   - Fetal Tetrology of Fallot   - Fetal 22q11 microdeletion   - FGR    Baby girl \"Maren\" is in the NICU.  Reanna is feeling well overall. Her  incision is healing well; stopped taking ibuprofen and tylenol 3-4 days ago; never needed the Oxycodone.  Denies pain or drainage.  Vaginal bleeding is light in amount.     Reanna has been working with a lactation consultant in the NICU. She is primarily pumping due to baby having difficulty with latch.  Pumping every 3 hours. Has nipple discomfort with pumping. Has had lactation evaluate her pump shield size is correct. Has rare bleeding; no cracking. Denies fever, chills, or other flu-like symptoms.  Denies erythema or warmth of the breast tissue or breast lumps.     Mental health: Reanna states her mental health is ok. She feels her emotions come and go. She and the baby's father are staying in a boarding room and spending a lot of time in the NICU with their daughter. Her daughter is doing well; working on feeding and growing. Planning to do her first surgery 8-10 weeks after birth.  Reanna feels she is able to cope with her emotions and mental health feelings. She does not have a hx of anxiety or depression.     PHQ-2: 2  Webb  Depression Scale: 4    Objective:  /73   Pulse 106   Ht 1.613 m (5' 3.5\")   Wt 68.4 kg (150 lb 14.4 oz)   LMP 2021   BMI 26.31 kg/m    General: pleasant female in no acute distress  Psych: normal mentation, well oriented  Respiratory:  Unlabored breathing  Musculoskeletal: no gross deformities  Breasts: nipples with scabbing bilaterally, R >L - no evidence of cracking or bleeding today; non-tender to palpation; no evidence of areola or breast skin changes, dimpling, warmth, or erythema.    Abdomen: incision is well " approximated, without erythema or drainage; non-tender    Assessment:  Encounter Diagnoses   Name Primary?     Nipple pain Yes     Routine postpartum follow-up      Postoperative state      Plan:  Reanna is 2 weeks postpartum; she presents to clinic today for routine post-op/ postpartum follow-up. Incision is heeling well. There is presence of nipple trauma from pumping; recommend APNO cream - apply sparingly after pumping/ breastfeeding. Rx placed. Reviewed warning signs/ symptoms of mastitis. Continue care with lactation consultant.  Reanna is feeling like she can manage her mental health symptoms at this time; offered [facility] to establish care with a psychologist; pt declines at this time.    Return to clinic for 6 week postpartum visit and sooner PRN.  Reanna expressed understanding and agreement with the plan for care.    Karlie Dubose, DNP, APRN, WHNP

## 2022-08-05 ENCOUNTER — OFFICE VISIT (OUTPATIENT)
Dept: CONSULT | Facility: CLINIC | Age: 37
End: 2022-08-05
Attending: GENETIC COUNSELOR, MS
Payer: COMMERCIAL

## 2022-08-05 ENCOUNTER — LACTATION ENCOUNTER (OUTPATIENT)
Age: 37
End: 2022-08-05

## 2022-08-05 DIAGNOSIS — Z84.89 FAMILY HISTORY OF GENETIC DISORDER: Primary | ICD-10-CM

## 2022-08-05 DIAGNOSIS — Z31.5 ENCOUNTER FOR PROCREATIVE GENETIC COUNSELING: ICD-10-CM

## 2022-08-05 PROCEDURE — 999N000069 HC STATISTIC GENETIC COUNSELING, < 16 MIN: Performed by: GENETIC COUNSELOR, MS

## 2022-08-05 NOTE — LACTATION NOTE
"This note was copied from a baby's chart.  Met with Reanna on rounds today, she was pumping at time of visit. She is feeling better about pumping, already not as much \"sensation\" on nipples as before. She saw provider for her 2 week check up and received a prescription for a compound cream to put on nipples (suspect APNO but mom does not remember what it's called). She says no infection but getting the ointment to help resolve some of the flaking and discomfort. Reanna notes when she saw provider the yellow color was not present, for some reason while she was there it was only white flaking/crust on nipples not the yellow/tan color it has been lately. Supply continues increasing slowly, no current concerns. Will continue to follow while inpatient.   "

## 2022-08-21 NOTE — PROGRESS NOTES
Name:  Reanna Gilbert  :   1985  MRN:   [id number]  Date of service: Aug 5, 2022  Referring Provider: Self    Genetic Counseling Consultation Note    Presenting Information:  A consultation in the [facility] was requested for Reanna, a 37 year old female, after her daughter's diagnosis of 22q11.2 deletion syndrome    Reanna was accompanied to this visit conducted in-person (in-patient consult for her daughter) by their partner and daughter.      History is obtained from Reanna and the medical record. I met with the family at the request of Karely Tim NP to obtain a personal and family history, discuss possible genetic contributions to her symptoms, and to obtain informed consent for genetic testing.    The family is very motivated to determine recurrence risk for future pregnancies.    Personal History:   Reanna does not report any major health concerns. Reanna has never been diagnosed with 22q11.2 deletion syndrome.    Reanna had an abnormal NIPT - high risk for 22q11.2 deletion syndrome. 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 An amniocentesis was performed and a microarray identified at 22q11.2 deletion, in addition to a small 12p deletion.     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     Reanna's daughter's genetic test results appear as follows:  Cytogenomic Nomenclature (ISCN): arr[[id number]]12p12.2p12.1([id number])x1,22q11.21([id number])x1  "            Family History:   A limited pedigree was obtained and is scanned into the medical record.  History pertinent to referral is underlined.    Children:     Daughter with recent diagnosis of 22q11.2 deletion syndrome    Siblings:     Full siblings:     36 y/o sister, history of hypertension    3.4 y/o nephew, healthy    2.4 y/o niece, healthy    31 y/o sister, healthy    Paternal:     Father, Toni: Healthy    Maternal:     Mother, Martha: History of mitral valve repair    There are no additional reports of family members with birth defects, autism, developmental delays, or intellectual disability.     Genetic Counseling Discussion:  Reanna's daughter's genetic testing showed an interstitial deletion (1 copy present) involving chromosome 22, within 22q11.21. This region contains at least 75 genes, including TBX1.  Deletion of this region is associated with the 22q11.2 microdeletion syndrome, also known as DiGeorge/velocardiofacial syndrome (DGS/VCFS), involving recurrent breakpoints within flanking low-copy repeat regions A and D (the \"typical\" genetic cause of 22q11.2 deletion syndrome). A discussion of 22q11.2 deletion syndrome was deferred today.    We reviewed that up to 10 percent of 22q11.2 deletions are inherited, sometimes from a mildly affected or unaffected parent. Parental FISH testing is recommended to evaluate the potential origin of this deletion and for recurrence risk counseling. Furthermore, It is appropriate to clarify the genetic status of apparently asymptomatic sibs and parents of an affected individual in order to identify as early as possible those family members who would benefit from cardiac and immunologic evaluation and evaluations and surveillance for other complications of 22q11.2 deletion syndrome. The purposed genetic testing is diagnostic, not investigational. Management and surveillance of Reanna will depend on the genetic test results. It can also help predict the recurrence " "risk for future pregnancies.    Because Reanna's daughter was identified to have an additional deletion on chromosome 12, we discussed the addition of karyotype analysis to evaluate for balanced chromosome rearrangements (previously reviewed by [facility] genetic counseling).    The potential results were discussed including a positive, negative, or variant of uncertain significance. We reviewed that it is possible to identify that either parent may have undiagnosed 22q11.2 deletion syndrome.    Plan:  1. Prior authorization for karyotype with FISH submitted. Family will be notified of results when they are available. Importantly, family has not been formally consented for genetic testing. Formal consent will need to occur prior to orders being placed for genetic testing. PA submitted prior to formal consent as family hopes to be able to get blood draws at [facility]'s out-patient genetics visit.     Vielka Garay MS [facility]  Genetic Counselor  Email: [email or web address]  Phone: [phone]  Pager: [phone]    Total Time Spent in Consultation: Approximately <15 minutes    CC: No Letter    References:  Bianka Brown et al. \"Practical guidelines for managing patients with 22q11. 2 deletion syndrome.\" The Journal of pediatrics 159.2 (2011): 332-339.    Loan DM, Eldon HS, García BS, et al. 22q11.2 Deletion Syndrome. 1999 Sep 23 [Updated 2020 Feb 27]. In: Keith MP, Cyndee DB, Slade LANDA, et al., editors. [employer]  [Internet]. [place] ([place]): [facility]; 2276-3371. Available from: https://www.ncbi.nlm.nih.gov/books/PIH7114/  "

## 2022-08-29 ENCOUNTER — MEDICAL CORRESPONDENCE (OUTPATIENT)
Dept: HEALTH INFORMATION MANAGEMENT | Facility: CLINIC | Age: 37
End: 2022-08-29

## 2022-09-06 ENCOUNTER — TELEPHONE (OUTPATIENT)
Dept: CONSULT | Facility: CLINIC | Age: 37
End: 2022-09-06

## 2022-09-06 NOTE — TELEPHONE ENCOUNTER
Called Reanna to review PA for genetic testing. She was not available and a non-detailed VM with contact information was left.     Following information has not yet been reviewed with the family: No PA for genetic is required. Estimated OOP is $0 - $3,292.    Vielka Garay MS [facility]  Genetic Counselor  Email: [email or web address]  Phone: [phone]  Pager: [phone]    Addendum 9/7/2022  Reanna returned my phone call and we reviewed the above information. She will reach out to her insurance company to see if she can get more information regarding likelihood of approval. No genetic testing at this time.

## 2022-10-03 ENCOUNTER — MEDICAL CORRESPONDENCE (OUTPATIENT)
Dept: CONSULT | Facility: CLINIC | Age: 37
End: 2022-10-03

## 2022-11-20 ENCOUNTER — HEALTH MAINTENANCE LETTER (OUTPATIENT)
Age: 37
End: 2022-11-20

## 2022-11-22 ENCOUNTER — MEDICAL CORRESPONDENCE (OUTPATIENT)
Dept: HEALTH INFORMATION MANAGEMENT | Facility: CLINIC | Age: 37
End: 2022-11-22

## 2023-02-20 NOTE — PROGRESS NOTES
"This writer called pediatric cardiology x3 to inquire regarding fetal echo order to be scheduled for patient per Dr. Ruiz. Spoke with Bree who said she would need to speak with peds cardiologist and get back to RN as \"they don't usually see patients\" this early gestation. Spoke with Karlie who said she will call RN back. Have not heard back if fetal echo is scheduled yet.     Will have MFM RN follow up with pediatric cardiology team again tomorrow 2/25.    Eva Pacheco RN on 2/24/2022 at 3:59 PM    "
Non-compliant behavior

## 2023-03-02 ENCOUNTER — LAB REQUISITION (OUTPATIENT)
Dept: LAB | Facility: CLINIC | Age: 38
End: 2023-03-02
Payer: COMMERCIAL

## 2023-03-02 DIAGNOSIS — Z01.419 ENCOUNTER FOR GYNECOLOGICAL EXAMINATION (GENERAL) (ROUTINE) WITHOUT ABNORMAL FINDINGS: ICD-10-CM

## 2023-03-02 LAB
FASTING STATUS PATIENT QL REPORTED: NORMAL
GLUCOSE SERPL-MCNC: 83 MG/DL (ref 70–99)

## 2023-03-02 PROCEDURE — 80061 LIPID PANEL: CPT | Performed by: OBSTETRICS & GYNECOLOGY

## 2023-03-02 PROCEDURE — 82947 ASSAY GLUCOSE BLOOD QUANT: CPT | Mod: ORL | Performed by: OBSTETRICS & GYNECOLOGY

## 2023-03-02 PROCEDURE — 87624 HPV HI-RISK TYP POOLED RSLT: CPT | Mod: ORL | Performed by: OBSTETRICS & GYNECOLOGY

## 2023-03-02 PROCEDURE — G0145 SCR C/V CYTO,THINLAYER,RESCR: HCPCS | Performed by: OBSTETRICS & GYNECOLOGY

## 2023-03-03 LAB
CHOLEST SERPL-MCNC: 221 MG/DL
HDLC SERPL-MCNC: 86 MG/DL
LDLC SERPL CALC-MCNC: 123 MG/DL
NONHDLC SERPL-MCNC: 135 MG/DL
TRIGL SERPL-MCNC: 60 MG/DL

## 2023-03-06 LAB
BKR LAB AP GYN ADEQUACY: NORMAL
BKR LAB AP GYN INTERPRETATION: NORMAL
BKR LAB AP HPV REFLEX: NORMAL
BKR LAB AP LMP: NORMAL
BKR LAB AP PREVIOUS ABNL DX: NORMAL
BKR LAB AP PREVIOUS ABNORMAL: NORMAL
PATH REPORT.COMMENTS IMP SPEC: NORMAL
PATH REPORT.COMMENTS IMP SPEC: NORMAL
PATH REPORT.RELEVANT HX SPEC: NORMAL

## 2023-03-08 LAB
HUMAN PAPILLOMA VIRUS 16 DNA: NEGATIVE
HUMAN PAPILLOMA VIRUS 18 DNA: NEGATIVE
HUMAN PAPILLOMA VIRUS FINAL DIAGNOSIS: NORMAL
HUMAN PAPILLOMA VIRUS OTHER HR: NEGATIVE

## 2023-04-15 ENCOUNTER — HEALTH MAINTENANCE LETTER (OUTPATIENT)
Age: 38
End: 2023-04-15

## 2024-06-22 ENCOUNTER — HEALTH MAINTENANCE LETTER (OUTPATIENT)
Age: 39
End: 2024-06-22

## 2024-09-17 NOTE — ANESTHESIA PROCEDURE NOTES
TAP Procedure Note    Pre-Procedure   Staff -        Anesthesiologist:  Ashley Reynaga MD       Resident/Fellow: Emma Parkinson MD       Performed By: with residents       Procedure performed by resident/fellow/CRNA in presence of a teaching physician.         Location: OB       Procedure Start/Stop Times: 7/22/2022 2:22 AM       Pre-Anesthestic Checklist: patient identified, IV checked, site marked, risks and benefits discussed, informed consent, monitors and equipment checked, pre-op evaluation, at physician/surgeon's request and post-op pain management  Timeout:       Correct Patient: Yes        Correct Procedure: Yes        Correct Site: Yes        Correct Position: Yes        Correct Laterality: Yes        Site Marked: Yes  Procedure Documentation  Procedure: TAP       Diagnosis: POST OPERATIVE PAIN       Laterality: bilateral       Patient Position: supine       Patient Prep/Sterile Barriers: sterile gloves, mask       Skin prep: Chloraprep       Insertion Site: T7-8.       Needle Type: short bevel       Needle Gauge: 21.        Needle Length (millimeters): 110        Ultrasound guided       1. Ultrasound was used to identify targeted nerve, plexus, vascular marker, or fascial plane and place a needle adjacent to it in real-time.       2. Ultrasound was used to visualize the spread of anesthetic in close proximity to the above referenced structure.       3. A permanent image is entered into the patient's record.    Assessment/Narrative         The placement was negative for: blood aspirated, painful injection and site bleeding       Paresthesias: No.       Bolus given via needle..        Secured via.        Insertion/Infusion Method: Single Shot       Complications: none       Injection made incrementally with aspirations every 5 mL.    Medication(s) Administered   Bupivacaine 0.25% PF (Infiltration) - Infiltration   20 mL - 7/22/2022 2:22:00 AM  Bupivacaine liposome (Exparel) 1.3% LA inj susp (Infiltration) -  Infiltration   20 mL - 7/22/2022 2:22:00 AM  Medication Administration Time: 7/22/2022 2:22 AM       Resulted

## 2025-05-22 ENCOUNTER — LAB REQUISITION (OUTPATIENT)
Dept: LAB | Facility: CLINIC | Age: 40
End: 2025-05-22
Payer: COMMERCIAL

## 2025-05-22 DIAGNOSIS — Z01.419 ENCOUNTER FOR GYNECOLOGICAL EXAMINATION (GENERAL) (ROUTINE) WITHOUT ABNORMAL FINDINGS: ICD-10-CM

## 2025-05-22 PROCEDURE — 80061 LIPID PANEL: CPT | Mod: ORL | Performed by: OBSTETRICS & GYNECOLOGY

## 2025-05-22 PROCEDURE — 82947 ASSAY GLUCOSE BLOOD QUANT: CPT | Mod: ORL | Performed by: OBSTETRICS & GYNECOLOGY

## 2025-05-23 LAB
CHOLEST SERPL-MCNC: 163 MG/DL
FASTING STATUS PATIENT QL REPORTED: NO
FASTING STATUS PATIENT QL REPORTED: NO
GLUCOSE SERPL-MCNC: 69 MG/DL (ref 70–99)
HDLC SERPL-MCNC: 62 MG/DL
LDLC SERPL CALC-MCNC: 83 MG/DL
NONHDLC SERPL-MCNC: 101 MG/DL
TRIGL SERPL-MCNC: 89 MG/DL

## 2025-07-12 ENCOUNTER — HEALTH MAINTENANCE LETTER (OUTPATIENT)
Age: 40
End: 2025-07-12

## (undated) DEVICE — DRSG STERI STRIP 1/4X3" R1541

## (undated) DEVICE — CATH TRAY FOLEY 16FR BARDEX W/DRAIN BAG STATLOCK 300316A

## (undated) DEVICE — STOCKING SLEEVE COMPRESSION CALF LG

## (undated) DEVICE — SOL WATER IRRIG 1000ML BOTTLE 07139-09

## (undated) DEVICE — PREP CHLORAPREP 26ML TINTED ORANGE  260815

## (undated) DEVICE — PACK C-SECTION LF PL15OTA83B

## (undated) DEVICE — SU VICRYL 0 CT-1 36" J346H

## (undated) DEVICE — GLOVE PROTEXIS BLUE W/NEU-THERA 7.0  2D73EB70

## (undated) DEVICE — DRSG ABDOMINAL 07 1/2X8" 7197D

## (undated) DEVICE — DRSG STERI STRIP 1/2X4" R1547

## (undated) DEVICE — ESU GROUND PAD UNIVERSAL W/O CORD

## (undated) DEVICE — SU VICRYL 3-0 CTX 36" UND J980H

## (undated) DEVICE — GLOVE ESTEEM POWDER FREE SMT 6.5  2D72PT65

## (undated) DEVICE — SOL NACL 0.9% IRRIG 1000ML BOTTLE 07138-09

## (undated) DEVICE — SOL ADH LIQUID BENZOIN SWAB 0.6ML C1544

## (undated) DEVICE — STRAP KNEE/BODY 31143004

## (undated) DEVICE — DRSG TELFA ISLAND 4X10"

## (undated) DEVICE — SU MONOCRYL 4-0 PS-2 18" UND Y496G

## (undated) RX ORDER — BUPIVACAINE HYDROCHLORIDE 2.5 MG/ML
INJECTION, SOLUTION EPIDURAL; INFILTRATION; INTRACAUDAL
Status: DISPENSED
Start: 2022-07-22

## (undated) RX ORDER — MORPHINE SULFATE 2 MG/ML
INJECTION, SOLUTION INTRAMUSCULAR; INTRAVENOUS
Status: DISPENSED
Start: 2022-07-22

## (undated) RX ORDER — FENTANYL CITRATE 50 UG/ML
INJECTION, SOLUTION INTRAMUSCULAR; INTRAVENOUS
Status: DISPENSED
Start: 2022-07-22

## (undated) RX ORDER — PHENYLEPHRINE HCL IN 0.9% NACL 50MG/250ML
PLASTIC BAG, INJECTION (ML) INTRAVENOUS
Status: DISPENSED
Start: 2022-07-22

## (undated) RX ORDER — ONDANSETRON 2 MG/ML
INJECTION INTRAMUSCULAR; INTRAVENOUS
Status: DISPENSED
Start: 2022-07-22